# Patient Record
Sex: FEMALE | Race: WHITE | HISPANIC OR LATINO | Employment: OTHER | URBAN - METROPOLITAN AREA
[De-identification: names, ages, dates, MRNs, and addresses within clinical notes are randomized per-mention and may not be internally consistent; named-entity substitution may affect disease eponyms.]

---

## 2019-06-20 ENCOUNTER — HOSPITAL ENCOUNTER (OUTPATIENT)
Dept: RADIOLOGY | Facility: HOSPITAL | Age: 84
Discharge: HOME | End: 2019-06-20
Attending: INTERNAL MEDICINE
Payer: MEDICARE

## 2019-06-20 DIAGNOSIS — M25.562 LEFT KNEE PAIN: ICD-10-CM

## 2019-06-20 DIAGNOSIS — M25.561 RIGHT KNEE PAIN: Primary | ICD-10-CM

## 2019-06-20 DIAGNOSIS — M25.561 RIGHT KNEE PAIN: ICD-10-CM

## 2019-06-20 DIAGNOSIS — M25.562 LEFT KNEE PAIN: Primary | ICD-10-CM

## 2019-06-20 PROCEDURE — 73560 X-RAY EXAM OF KNEE 1 OR 2: CPT | Mod: 50,TC,FY

## 2019-06-20 PROCEDURE — 73560 X-RAY EXAM OF KNEE 1 OR 2: CPT | Mod: 26,50,, | Performed by: RADIOLOGY

## 2019-06-20 PROCEDURE — 73560 XR KNEE 1 OR 2 VIEW BILATERAL: ICD-10-PCS | Mod: 26,50,, | Performed by: RADIOLOGY

## 2020-08-17 PROBLEM — M17.0 PRIMARY OSTEOARTHRITIS OF BOTH KNEES: Status: ACTIVE | Noted: 2019-08-15

## 2020-08-17 PROBLEM — G47.00 INSOMNIA: Status: ACTIVE | Noted: 2018-07-26

## 2020-08-17 PROBLEM — J44.9 CHRONIC OBSTRUCTIVE PULMONARY DISEASE, UNSPECIFIED: Status: ACTIVE | Noted: 2019-08-15

## 2020-08-17 PROBLEM — M46.96 INFLAMMATORY SPONDYLOPATHY OF LUMBAR REGION: Status: ACTIVE | Noted: 2017-07-26

## 2021-02-25 ENCOUNTER — HOSPITAL ENCOUNTER (OUTPATIENT)
Dept: RADIOLOGY | Facility: HOSPITAL | Age: 86
Discharge: HOME | End: 2021-02-25
Attending: INTERNAL MEDICINE
Payer: MEDICARE

## 2021-02-25 ENCOUNTER — CLINICAL SUPPORT (OUTPATIENT)
Dept: LAB | Facility: HOSPITAL | Age: 86
End: 2021-02-25
Attending: INTERNAL MEDICINE
Payer: MEDICARE

## 2021-02-25 DIAGNOSIS — I87.2 VENOUS (PERIPHERAL) INSUFFICIENCY: ICD-10-CM

## 2021-02-25 DIAGNOSIS — E11.59 TYPE 2 DIABETES MELLITUS WITH OTHER CIRCULATORY COMPLICATIONS: ICD-10-CM

## 2021-02-25 DIAGNOSIS — Z01.818 PREOP EXAMINATION: ICD-10-CM

## 2021-02-25 DIAGNOSIS — J44.9 CHRONIC OBSTRUCTIVE PULMONARY DISEASE, UNSPECIFIED COPD TYPE: ICD-10-CM

## 2021-02-25 PROBLEM — M17.9 OSTEOARTHRITIS OF KNEE: Status: ACTIVE | Noted: 2021-02-25

## 2021-02-25 PROBLEM — E11.42 POLYNEUROPATHY DUE TO TYPE 2 DIABETES MELLITUS: Status: ACTIVE | Noted: 2021-02-25

## 2021-02-25 PROBLEM — E11.51 PERIPHERAL VASCULAR DISORDER DUE TO DIABETES MELLITUS: Status: ACTIVE | Noted: 2021-02-25

## 2021-02-25 PROCEDURE — 71046 X-RAY EXAM CHEST 2 VIEWS: CPT | Mod: TC,FY

## 2021-02-25 PROCEDURE — 93005 ELECTROCARDIOGRAM TRACING: CPT

## 2021-02-25 PROCEDURE — 93010 EKG 12-LEAD: ICD-10-PCS | Mod: ,,, | Performed by: INTERNAL MEDICINE

## 2021-02-25 PROCEDURE — 71046 XR CHEST PA AND LATERAL: ICD-10-PCS | Mod: 26,,, | Performed by: RADIOLOGY

## 2021-02-25 PROCEDURE — 93010 ELECTROCARDIOGRAM REPORT: CPT | Mod: ,,, | Performed by: INTERNAL MEDICINE

## 2021-02-25 PROCEDURE — 71046 X-RAY EXAM CHEST 2 VIEWS: CPT | Mod: 26,,, | Performed by: RADIOLOGY

## 2022-09-02 ENCOUNTER — LAB VISIT (OUTPATIENT)
Dept: LAB | Facility: HOSPITAL | Age: 87
End: 2022-09-02
Attending: INTERNAL MEDICINE
Payer: MEDICARE

## 2022-09-02 DIAGNOSIS — N18.32 TYPE 2 DIABETES MELLITUS WITH STAGE 3B CHRONIC KIDNEY DISEASE, WITHOUT LONG-TERM CURRENT USE OF INSULIN: ICD-10-CM

## 2022-09-02 DIAGNOSIS — E11.22 TYPE 2 DIABETES MELLITUS WITH STAGE 3B CHRONIC KIDNEY DISEASE, WITHOUT LONG-TERM CURRENT USE OF INSULIN: ICD-10-CM

## 2022-09-02 LAB
ALBUMIN SERPL BCP-MCNC: 3.6 G/DL (ref 3.5–5.2)
ALP SERPL-CCNC: 39 U/L (ref 55–135)
ALT SERPL W/O P-5'-P-CCNC: <5 U/L (ref 10–44)
ANION GAP SERPL CALC-SCNC: 8 MMOL/L (ref 8–16)
AST SERPL-CCNC: 13 U/L (ref 10–40)
BASOPHILS # BLD AUTO: 0.03 K/UL (ref 0–0.2)
BASOPHILS NFR BLD: 0.5 % (ref 0–1.9)
BILIRUB SERPL-MCNC: 0.3 MG/DL (ref 0.1–1)
BUN SERPL-MCNC: 38 MG/DL (ref 8–23)
CALCIUM SERPL-MCNC: 9.1 MG/DL (ref 8.7–10.5)
CHLORIDE SERPL-SCNC: 108 MMOL/L (ref 95–110)
CHOLEST SERPL-MCNC: 112 MG/DL (ref 120–199)
CHOLEST/HDLC SERPL: 3.4 {RATIO} (ref 2–5)
CO2 SERPL-SCNC: 22 MMOL/L (ref 23–29)
CREAT SERPL-MCNC: 1.4 MG/DL (ref 0.5–1.4)
DIFFERENTIAL METHOD: ABNORMAL
EOSINOPHIL # BLD AUTO: 0.4 K/UL (ref 0–0.5)
EOSINOPHIL NFR BLD: 6.7 % (ref 0–8)
ERYTHROCYTE [DISTWIDTH] IN BLOOD BY AUTOMATED COUNT: 14.4 % (ref 11.5–14.5)
EST. GFR  (NO RACE VARIABLE): 37 ML/MIN/1.73 M^2
ESTIMATED AVG GLUCOSE: 120 MG/DL (ref 68–131)
GLUCOSE SERPL-MCNC: 94 MG/DL (ref 70–110)
HBA1C MFR BLD: 5.8 % (ref 4–5.6)
HCT VFR BLD AUTO: 30 % (ref 37–48.5)
HDLC SERPL-MCNC: 33 MG/DL (ref 40–75)
HDLC SERPL: 29.5 % (ref 20–50)
HGB BLD-MCNC: 9.7 G/DL (ref 12–16)
IMM GRANULOCYTES # BLD AUTO: 0.03 K/UL (ref 0–0.04)
IMM GRANULOCYTES NFR BLD AUTO: 0.5 % (ref 0–0.5)
LDLC SERPL CALC-MCNC: 56 MG/DL (ref 63–159)
LYMPHOCYTES # BLD AUTO: 1.3 K/UL (ref 1–4.8)
LYMPHOCYTES NFR BLD: 20.5 % (ref 18–48)
MCH RBC QN AUTO: 34.4 PG (ref 27–31)
MCHC RBC AUTO-ENTMCNC: 32.3 G/DL (ref 32–36)
MCV RBC AUTO: 106 FL (ref 82–98)
MONOCYTES # BLD AUTO: 0.4 K/UL (ref 0.3–1)
MONOCYTES NFR BLD: 6.1 % (ref 4–15)
NEUTROPHILS # BLD AUTO: 4.2 K/UL (ref 1.8–7.7)
NEUTROPHILS NFR BLD: 65.7 % (ref 38–73)
NONHDLC SERPL-MCNC: 79 MG/DL
NRBC BLD-RTO: 0 /100 WBC
PLATELET # BLD AUTO: 151 K/UL (ref 150–450)
PMV BLD AUTO: 12.7 FL (ref 9.2–12.9)
POTASSIUM SERPL-SCNC: 4.8 MMOL/L (ref 3.5–5.1)
PROT SERPL-MCNC: 6.9 G/DL (ref 6–8.4)
RBC # BLD AUTO: 2.82 M/UL (ref 4–5.4)
SODIUM SERPL-SCNC: 138 MMOL/L (ref 136–145)
TRIGL SERPL-MCNC: 115 MG/DL (ref 30–150)
WBC # BLD AUTO: 6.44 K/UL (ref 3.9–12.7)

## 2022-09-02 PROCEDURE — 80053 COMPREHEN METABOLIC PANEL: CPT | Performed by: INTERNAL MEDICINE

## 2022-09-02 PROCEDURE — 85025 COMPLETE CBC W/AUTO DIFF WBC: CPT | Performed by: INTERNAL MEDICINE

## 2022-09-02 PROCEDURE — 83036 HEMOGLOBIN GLYCOSYLATED A1C: CPT | Performed by: INTERNAL MEDICINE

## 2022-09-02 PROCEDURE — 80061 LIPID PANEL: CPT | Performed by: INTERNAL MEDICINE

## 2022-09-02 PROCEDURE — 36415 COLL VENOUS BLD VENIPUNCTURE: CPT | Performed by: INTERNAL MEDICINE

## 2022-12-08 ENCOUNTER — HOSPITAL ENCOUNTER (EMERGENCY)
Facility: HOSPITAL | Age: 87
Discharge: HOME | End: 2022-12-08
Attending: EMERGENCY MEDICINE
Payer: MEDICARE

## 2022-12-08 VITALS
OXYGEN SATURATION: 100 % | RESPIRATION RATE: 18 BRPM | HEART RATE: 68 BPM | SYSTOLIC BLOOD PRESSURE: 178 MMHG | TEMPERATURE: 98 F | DIASTOLIC BLOOD PRESSURE: 81 MMHG

## 2022-12-08 DIAGNOSIS — K59.00 CONSTIPATION, UNSPECIFIED CONSTIPATION TYPE: Primary | ICD-10-CM

## 2022-12-08 DIAGNOSIS — K59.00 CONSTIPATION: ICD-10-CM

## 2022-12-08 PROCEDURE — 99283 EMERGENCY DEPT VISIT LOW MDM: CPT

## 2022-12-08 RX ORDER — POLYETHYLENE GLYCOL 3350, SODIUM SULFATE ANHYDROUS, SODIUM BICARBONATE, SODIUM CHLORIDE, POTASSIUM CHLORIDE 236; 22.74; 6.74; 5.86; 2.97 G/4L; G/4L; G/4L; G/4L; G/4L
4 POWDER, FOR SOLUTION ORAL ONCE
Qty: 4000 ML | Refills: 0 | Status: SHIPPED | OUTPATIENT
Start: 2022-12-08 | End: 2022-12-08

## 2022-12-08 RX ORDER — SYRING-NEEDL,DISP,INSUL,0.3 ML 29 G X1/2"
296 SYRINGE, EMPTY DISPOSABLE MISCELLANEOUS ONCE
Qty: 296 ML | Refills: 0 | Status: SHIPPED | OUTPATIENT
Start: 2022-12-08 | End: 2022-12-08

## 2022-12-08 NOTE — DISCHARGE INSTRUCTIONS
Plenty of fluids.  Take half a bottle of magnesium citrate when you go home, if no bowel movement in 2-3 hours, take the other half.

## 2022-12-08 NOTE — ED PROVIDER NOTES
Encounter Date: 12/8/2022    SCRIBE #1 NOTE: I, Joelle Sabillon, am scribing for, and in the presence of,  Eduardo Celeste MD. I have scribed the following portions of the note - Other sections scribed: BRIANNA CAI.     History     Chief Complaint   Patient presents with    Constipation     Unable to have BM for 2 days.  Has used OTC medications that are not working.     Dahlia Spence is a 87 y.o. female who presents to the ED with constipation with onset 2 days ago. Her associated symptoms include abdominal cramping. She denies nausea. She tried taking Miralax and Milk of Magnesia without relief. She has not had similar symptoms in the past. She states her last bowel movement was 2 days ago.       The history is provided by the patient. No  was used.   Review of patient's allergies indicates:   Allergen Reactions    Gabapentin Hallucinations     Past Medical History:   Diagnosis Date    Arthritis     Gout     High cholesterol     Hypertension      Past Surgical History:   Procedure Laterality Date    ANKLE SURGERY      CHOLECYSTECTOMY      HYSTERECTOMY       No family history on file.  Social History     Tobacco Use    Smoking status: Never    Smokeless tobacco: Never   Substance Use Topics    Alcohol use: No    Drug use: No     Review of Systems   Constitutional: Negative.    HENT: Negative.     Eyes: Negative.    Respiratory: Negative.     Cardiovascular: Negative.    Gastrointestinal:  Positive for abdominal pain (abdominal cramping) and constipation. Negative for nausea.   Genitourinary: Negative.    Musculoskeletal: Negative.    Skin: Negative.    Neurological: Negative.      Physical Exam     Initial Vitals   BP Pulse Resp Temp SpO2   12/08/22 1351 12/08/22 1351 12/08/22 1351 12/08/22 1515 12/08/22 1351   (!) 178/81 68 18 98.4 °F (36.9 °C) 100 %      MAP       --                Physical Exam    Nursing note and vitals reviewed.  Constitutional: She appears well-developed and  well-nourished. She is not diaphoretic. No distress.   HENT:   Head: Normocephalic and atraumatic.   Nose: Nose normal.   Eyes: EOM are normal. Pupils are equal, round, and reactive to light.   Neck: Neck supple. No JVD present.   Normal range of motion.  Cardiovascular:  Normal rate, regular rhythm, normal heart sounds and intact distal pulses.           Pulmonary/Chest: Breath sounds normal. No stridor. No respiratory distress. She has no wheezes. She has no rales.   Abdominal: Abdomen is soft. Bowel sounds are normal. She exhibits no distension. There is no abdominal tenderness.   Musculoskeletal:         General: No tenderness or edema. Normal range of motion.      Cervical back: Normal range of motion and neck supple.     Neurological: She is alert and oriented to person, place, and time. She has normal strength.   Skin: Skin is warm and dry. Capillary refill takes less than 2 seconds. No rash noted. No erythema.       ED Course   Procedures  Labs Reviewed - No data to display       Imaging Results              X-Ray Abdomen AP 1 View (KUB) (Final result)  Result time 12/08/22 15:37:35      Final result by Gilles Pavon MD (12/08/22 15:37:35)                   Impression:      1. Findings suggest constipation, correlation advised.      Electronically signed by: Gilles Pavon MD  Date:    12/08/2022  Time:    15:37               Narrative:    EXAMINATION:  XR ABDOMEN AP 1 VIEW    CLINICAL HISTORY:  Constipation, unspecified    TECHNIQUE:  AP View(s) of the abdomen was performed.    COMPARISON:  None    FINDINGS:  Three views abdomen supine.    Air and stool is seen within the large bowel and projected over the rectum.  There is a large amount of stool throughout the colon.  There is slow flow through a few scattered small bowel loops.  There are no calcifications to convincingly suggest nephrolithiasis or cholelithiasis.  The lower lung zones are grossly clear.  There are degenerative changes of the  osseous structures noting disc spacing material at L4-L5.  No findings to suggest pneumatosis.                                       Medications - No data to display       MDM:    87-year-old female with past medical history as noted above presenting with concern for constipation.  Physical exam as noted above, ED workup notable for KUB with evidence of constipation.  Patient presentation consistent with constipation with history, exam in additional findings consistent with this.  Advised patient on further symptomatic care at home, physical exam today's completely unremarkable otherwise with stable vitals.  Discussed with family member bedside regarding plan of care, additional symptomatic care and strict ED return precautions.  At this point time based on physical exam evaluation not suspect acute small-bowel obstruction, large-bowel obstruction, volvulus, intussusception, appendicitis, pancreatitis, cholecystitis, gastric ulcer, or any further surgical or medical emergency. Discussed diagnosis and further treatment with patient, including f/u.  Return precautions given and all questions answered.  Patient in understanding of plan.  Pt discharged to home improved and stable.          Scribe Attestation:   Scribe #1: I performed the above scribed service and the documentation accurately describes the services I performed. I attest to the accuracy of the note.                   Clinical Impression:   Final diagnoses:  [K59.00] Constipation  [K59.00] Constipation, unspecified constipation type (Primary)        ED Disposition Condition    Discharge Stable          ED Prescriptions       Medication Sig Dispense Start Date End Date Auth. Provider    magnesium citrate solution () Take 296 mLs by mouth once. for 1 dose 296 mL 2022 Eduardo Celeste MD    polyethylene glycol (GOLYTELY) 236-22.74-6.74 -5.86 gram suspension () Take 4,000 mLs (4 L total) by mouth once. for 1 dose 4,000 mL  12/8/2022 12/8/2022 Eduardo Celeste MD          Follow-up Information       Follow up With Specialties Details Why Contact Info    [facility] - Emergency Dept Emergency Medicine Go to  If symptoms worsen [address]  [facility] [id number] [phone]    Frandy Roe MD Internal Medicine Go in 1 week As needed [address]  [address]  [facility] [id number] [phone]            Physician Attestation for Scribe: I, Eduardo Celeste M.D., reviewed documentation as scribed in my presence, which is both accurate and complete.      Eduardo Celeste MD  12/09/22 0129

## 2022-12-08 NOTE — FIRST PROVIDER EVALUATION
Emergency Department TeleTriage Encounter Note      CHIEF COMPLAINT    Chief Complaint   Patient presents with    Constipation     Unable to have BM for 2 days.  Has used OTC medications that are not working.       VITAL SIGNS   Initial Vitals   BP Pulse Resp Temp SpO2   -- -- -- -- --      MAP       --            ALLERGIES    Review of patient's allergies indicates:   Allergen Reactions    Gabapentin Hallucinations       PROVIDER TRIAGE NOTE  TeleTriage Note: Dahlia Spence, a nontoxic/well appearing, 87 y.o. female, presented to the ED with c/o constipation for the past 2 days. She has taken miralax and a laxative without relief. Denies N/V.    All ED beds are full at present; patient notified of this status.  Patient seen and medically screened by Nurse Practitioner via teletriage. Orders initiated at triage to expedite care.  Patient is stable to return to the waiting room and will be placed in an ED bed when available.  Care will be transferred to an alternate provider when patient has been placed in an Exam Room from the [facility] for physical exam, additional orders, and disposition.  12:50 PM Betsy Anne DNP, FNP-C        ORDERS  Labs Reviewed - No data to display    ED Orders (720h ago, onward)      None              Virtual Visit Note: The provider triage portion of this emergency department evaluation and documentation was performed via [employer], a HIPAA-compliant telemedicine application, in concert with a tele-presenter in the room. A face to face patient evaluation with one of my colleagues will occur once the patient is placed in an emergency department room.      DISCLAIMER: This note was prepared with [employer] voice recognition transcription software. Garbled syntax, mangled pronouns, and other bizarre constructions may be attributed to that software system.

## 2023-03-07 ENCOUNTER — LAB VISIT (OUTPATIENT)
Dept: LAB | Facility: HOSPITAL | Age: 88
End: 2023-03-07
Attending: INTERNAL MEDICINE
Payer: MEDICARE

## 2023-03-07 DIAGNOSIS — N18.32 TYPE 2 DIABETES MELLITUS WITH STAGE 3B CHRONIC KIDNEY DISEASE, WITHOUT LONG-TERM CURRENT USE OF INSULIN: ICD-10-CM

## 2023-03-07 DIAGNOSIS — D53.9 ANEMIA, MACROCYTIC: ICD-10-CM

## 2023-03-07 DIAGNOSIS — L30.8 PRURITIC DERMATITIS: ICD-10-CM

## 2023-03-07 DIAGNOSIS — E11.22 TYPE 2 DIABETES MELLITUS WITH STAGE 3B CHRONIC KIDNEY DISEASE, WITHOUT LONG-TERM CURRENT USE OF INSULIN: ICD-10-CM

## 2023-03-07 LAB
ALBUMIN SERPL BCP-MCNC: 3.8 G/DL (ref 3.5–5.2)
ALP SERPL-CCNC: 36 U/L (ref 55–135)
ALT SERPL W/O P-5'-P-CCNC: 5 U/L (ref 10–44)
ANION GAP SERPL CALC-SCNC: 7 MMOL/L (ref 8–16)
AST SERPL-CCNC: 18 U/L (ref 10–40)
BASOPHILS # BLD AUTO: 0.04 K/UL (ref 0–0.2)
BASOPHILS NFR BLD: 0.7 % (ref 0–1.9)
BILIRUB SERPL-MCNC: 0.2 MG/DL (ref 0.1–1)
BUN SERPL-MCNC: 39 MG/DL (ref 8–23)
CALCIUM SERPL-MCNC: 9.2 MG/DL (ref 8.7–10.5)
CHLORIDE SERPL-SCNC: 107 MMOL/L (ref 95–110)
CO2 SERPL-SCNC: 22 MMOL/L (ref 23–29)
CREAT SERPL-MCNC: 1.5 MG/DL (ref 0.5–1.4)
CRP SERPL-MCNC: 0.4 MG/L (ref 0–8.2)
DIFFERENTIAL METHOD: ABNORMAL
EOSINOPHIL # BLD AUTO: 0.6 K/UL (ref 0–0.5)
EOSINOPHIL NFR BLD: 10.9 % (ref 0–8)
ERYTHROCYTE [DISTWIDTH] IN BLOOD BY AUTOMATED COUNT: 14.7 % (ref 11.5–14.5)
ERYTHROCYTE [SEDIMENTATION RATE] IN BLOOD BY PHOTOMETRIC METHOD: 37 MM/HR (ref 0–36)
EST. GFR  (NO RACE VARIABLE): 34 ML/MIN/1.73 M^2
ESTIMATED AVG GLUCOSE: 111 MG/DL (ref 68–131)
GLUCOSE SERPL-MCNC: 90 MG/DL (ref 70–110)
HBA1C MFR BLD: 5.5 % (ref 4–5.6)
HCT VFR BLD AUTO: 29.5 % (ref 37–48.5)
HGB BLD-MCNC: 9.7 G/DL (ref 12–16)
IMM GRANULOCYTES # BLD AUTO: 0.02 K/UL (ref 0–0.04)
IMM GRANULOCYTES NFR BLD AUTO: 0.4 % (ref 0–0.5)
LYMPHOCYTES # BLD AUTO: 1.3 K/UL (ref 1–4.8)
LYMPHOCYTES NFR BLD: 24.2 % (ref 18–48)
MCH RBC QN AUTO: 34.6 PG (ref 27–31)
MCHC RBC AUTO-ENTMCNC: 32.9 G/DL (ref 32–36)
MCV RBC AUTO: 105 FL (ref 82–98)
MONOCYTES # BLD AUTO: 0.4 K/UL (ref 0.3–1)
MONOCYTES NFR BLD: 7.3 % (ref 4–15)
NEUTROPHILS # BLD AUTO: 3.1 K/UL (ref 1.8–7.7)
NEUTROPHILS NFR BLD: 56.5 % (ref 38–73)
NRBC BLD-RTO: 0 /100 WBC
PLATELET # BLD AUTO: 150 K/UL (ref 150–450)
PMV BLD AUTO: 12 FL (ref 9.2–12.9)
POTASSIUM SERPL-SCNC: 4.7 MMOL/L (ref 3.5–5.1)
PROT SERPL-MCNC: 7.1 G/DL (ref 6–8.4)
RBC # BLD AUTO: 2.8 M/UL (ref 4–5.4)
SODIUM SERPL-SCNC: 136 MMOL/L (ref 136–145)
WBC # BLD AUTO: 5.49 K/UL (ref 3.9–12.7)

## 2023-03-07 PROCEDURE — 36415 COLL VENOUS BLD VENIPUNCTURE: CPT | Performed by: INTERNAL MEDICINE

## 2023-03-07 PROCEDURE — 83036 HEMOGLOBIN GLYCOSYLATED A1C: CPT | Performed by: INTERNAL MEDICINE

## 2023-03-07 PROCEDURE — 82607 VITAMIN B-12: CPT | Performed by: INTERNAL MEDICINE

## 2023-03-07 PROCEDURE — 82746 ASSAY OF FOLIC ACID SERUM: CPT | Performed by: INTERNAL MEDICINE

## 2023-03-07 PROCEDURE — 86140 C-REACTIVE PROTEIN: CPT | Performed by: INTERNAL MEDICINE

## 2023-03-07 PROCEDURE — 85652 RBC SED RATE AUTOMATED: CPT | Performed by: INTERNAL MEDICINE

## 2023-03-07 PROCEDURE — 80053 COMPREHEN METABOLIC PANEL: CPT | Performed by: INTERNAL MEDICINE

## 2023-03-07 PROCEDURE — 85025 COMPLETE CBC W/AUTO DIFF WBC: CPT | Performed by: INTERNAL MEDICINE

## 2023-03-08 LAB
FOLATE SERPL-MCNC: 6.8 NG/ML (ref 4–24)
VIT B12 SERPL-MCNC: 232 PG/ML (ref 210–950)

## 2024-03-25 ENCOUNTER — HOSPITAL ENCOUNTER (OUTPATIENT)
Dept: RADIOLOGY | Facility: HOSPITAL | Age: 89
Discharge: HOME | End: 2024-03-25
Attending: INTERNAL MEDICINE
Payer: MEDICAID

## 2024-03-25 ENCOUNTER — CLINICAL SUPPORT (OUTPATIENT)
Dept: LAB | Facility: HOSPITAL | Age: 89
End: 2024-03-25
Attending: INTERNAL MEDICINE
Payer: MEDICARE

## 2024-03-25 DIAGNOSIS — J44.9 CHRONIC OBSTRUCTIVE PULMONARY DISEASE, UNSPECIFIED COPD TYPE: ICD-10-CM

## 2024-03-25 DIAGNOSIS — I10 BENIGN ESSENTIAL HYPERTENSION: ICD-10-CM

## 2024-03-25 PROBLEM — L84 CORN OF FOOT: Status: ACTIVE | Noted: 2024-03-25

## 2024-03-25 PROBLEM — L30.8 PRURITIC DERMATITIS: Status: ACTIVE | Noted: 2024-03-25

## 2024-03-25 PROCEDURE — 71046 X-RAY EXAM CHEST 2 VIEWS: CPT | Mod: FY,,, | Performed by: RADIOLOGY

## 2024-03-25 PROCEDURE — 93010 ELECTROCARDIOGRAM REPORT: CPT | Mod: ,,, | Performed by: INTERNAL MEDICINE

## 2024-03-25 PROCEDURE — 71046 X-RAY EXAM CHEST 2 VIEWS: CPT | Mod: TC,FY

## 2024-03-25 PROCEDURE — 93005 ELECTROCARDIOGRAM TRACING: CPT

## 2024-03-26 LAB
OHS QRS DURATION: 120 MS
OHS QTC CALCULATION: 434 MS

## 2024-03-28 ENCOUNTER — TELEPHONE (OUTPATIENT)
Dept: PODIATRY | Facility: CLINIC | Age: 89
End: 2024-03-28
Payer: MEDICARE

## 2024-03-28 NOTE — TELEPHONE ENCOUNTER
Called the number provided in the message back, is a Nursing home, no answer, left message to let them know if they need an appointment, they can call our appointment line [phone] , and they can help scheduling an appointment to see Dr. Chi or Dr. Hurd

## 2024-03-28 NOTE — TELEPHONE ENCOUNTER
----- Message from Ana Olsen sent at 3/28/2024  2:17 PM CDT -----  Regarding: Call back  Contact: [phone]  Type:  Patient Returning Call    Who Called: PT   Who Left Message for Patient: Nurse   Does the patient know what this is regarding?: Yes   Would the patient rather a call back or a response via [facility]? Call back   Best Call Back Number: [phone]   Additional Information:

## 2024-04-01 ENCOUNTER — TELEPHONE (OUTPATIENT)
Dept: PODIATRY | Facility: CLINIC | Age: 89
End: 2024-04-01
Payer: MEDICARE

## 2024-06-21 ENCOUNTER — HOSPITAL ENCOUNTER (INPATIENT)
Facility: HOSPITAL | Age: 89
LOS: 11 days | Discharge: NURSING, REHAB OR LONG-TERM CARE | DRG: 814 | End: 2024-07-03
Attending: EMERGENCY MEDICINE | Admitting: INTERNAL MEDICINE
Payer: MEDICARE

## 2024-06-21 DIAGNOSIS — I10 BENIGN ESSENTIAL HYPERTENSION: ICD-10-CM

## 2024-06-21 DIAGNOSIS — R00.1 BRADYCARDIA: ICD-10-CM

## 2024-06-21 DIAGNOSIS — L03.115 CELLULITIS OF KNEE, RIGHT: Primary | ICD-10-CM

## 2024-06-21 DIAGNOSIS — T84.50XA PROSTHETIC JOINT INFECTION: ICD-10-CM

## 2024-06-21 DIAGNOSIS — L50.9 HIVES: ICD-10-CM

## 2024-06-21 DIAGNOSIS — T78.40XA ALLERGIC REACTION: ICD-10-CM

## 2024-06-21 DIAGNOSIS — R21 RASH: ICD-10-CM

## 2024-06-21 DIAGNOSIS — T78.40XA ALLERGIC REACTION TO DRUG, INITIAL ENCOUNTER: ICD-10-CM

## 2024-06-21 DIAGNOSIS — R60.9 EDEMA: ICD-10-CM

## 2024-06-21 LAB
ALBUMIN SERPL BCP-MCNC: 1.8 G/DL (ref 3.5–5.2)
ALLENS TEST: ABNORMAL
ALP SERPL-CCNC: 81 U/L (ref 55–135)
ALT SERPL W/O P-5'-P-CCNC: 5 U/L (ref 10–44)
ANION GAP SERPL CALC-SCNC: 21 MMOL/L (ref 8–16)
AST SERPL-CCNC: 14 U/L (ref 10–40)
BASOPHILS # BLD AUTO: 0.13 K/UL (ref 0–0.2)
BASOPHILS NFR BLD: 0.6 % (ref 0–1.9)
BILIRUB SERPL-MCNC: 0.2 MG/DL (ref 0.1–1)
BUN SERPL-MCNC: 87 MG/DL (ref 8–23)
BUN SERPL-MCNC: 96 MG/DL (ref 6–30)
CALCIUM SERPL-MCNC: 8 MG/DL (ref 8.7–10.5)
CHLORIDE SERPL-SCNC: 103 MMOL/L (ref 95–110)
CHLORIDE SERPL-SCNC: 106 MMOL/L (ref 95–110)
CO2 SERPL-SCNC: 12 MMOL/L (ref 23–29)
CREAT SERPL-MCNC: 5 MG/DL (ref 0.5–1.4)
CREAT SERPL-MCNC: 5.6 MG/DL (ref 0.5–1.4)
CRP SERPL-MCNC: 207.7 MG/L (ref 0–8.2)
DIFFERENTIAL METHOD BLD: ABNORMAL
EOSINOPHIL # BLD AUTO: 1.4 K/UL (ref 0–0.5)
EOSINOPHIL NFR BLD: 6.8 % (ref 0–8)
ERYTHROCYTE [DISTWIDTH] IN BLOOD BY AUTOMATED COUNT: 22.3 % (ref 11.5–14.5)
ERYTHROCYTE [SEDIMENTATION RATE] IN BLOOD BY PHOTOMETRIC METHOD: 61 MM/HR (ref 0–36)
EST. GFR  (NO RACE VARIABLE): 7.9 ML/MIN/1.73 M^2
GLUCOSE SERPL-MCNC: 115 MG/DL (ref 70–110)
GLUCOSE SERPL-MCNC: 119 MG/DL (ref 70–110)
HCT VFR BLD AUTO: 37.4 % (ref 37–48.5)
HCT VFR BLD CALC: 40 %PCV (ref 36–54)
HCV AB SERPL QL IA: NORMAL
HGB BLD-MCNC: 12.6 G/DL (ref 12–16)
HIV 1+2 AB+HIV1 P24 AG SERPL QL IA: NORMAL
IMM GRANULOCYTES # BLD AUTO: 0.95 K/UL (ref 0–0.04)
IMM GRANULOCYTES NFR BLD AUTO: 4.6 % (ref 0–0.5)
LACTATE SERPL-SCNC: 1.3 MMOL/L (ref 0.5–2.2)
LDH SERPL L TO P-CCNC: 2.77 MMOL/L (ref 0.5–2.2)
LYMPHOCYTES # BLD AUTO: 3.1 K/UL (ref 1–4.8)
LYMPHOCYTES NFR BLD: 14.8 % (ref 18–48)
MAGNESIUM SERPL-MCNC: 1.9 MG/DL (ref 1.6–2.6)
MCH RBC QN AUTO: 32 PG (ref 27–31)
MCHC RBC AUTO-ENTMCNC: 33.7 G/DL (ref 32–36)
MCV RBC AUTO: 95 FL (ref 82–98)
MONOCYTES # BLD AUTO: 0.4 K/UL (ref 0.3–1)
MONOCYTES NFR BLD: 1.7 % (ref 4–15)
NEUTROPHILS # BLD AUTO: 14.7 K/UL (ref 1.8–7.7)
NEUTROPHILS NFR BLD: 71.5 % (ref 38–73)
NRBC BLD-RTO: 1 /100 WBC
OHS QRS DURATION: 128 MS
OHS QTC CALCULATION: 511 MS
PLATELET # BLD AUTO: 225 K/UL (ref 150–450)
PMV BLD AUTO: 10.9 FL (ref 9.2–12.9)
POC IONIZED CALCIUM: 1.03 MMOL/L (ref 1.06–1.42)
POC TCO2 (MEASURED): 13 MMOL/L (ref 23–29)
POCT GLUCOSE: 172 MG/DL (ref 70–110)
POTASSIUM BLD-SCNC: 2.7 MMOL/L (ref 3.5–5.1)
POTASSIUM SERPL-SCNC: 3 MMOL/L (ref 3.5–5.1)
PROT SERPL-MCNC: 6.3 G/DL (ref 6–8.4)
RBC # BLD AUTO: 3.94 M/UL (ref 4–5.4)
SAMPLE: ABNORMAL
SAMPLE: ABNORMAL
SITE: ABNORMAL
SODIUM BLD-SCNC: 137 MMOL/L (ref 136–145)
SODIUM SERPL-SCNC: 136 MMOL/L (ref 136–145)
WBC # BLD AUTO: 20.56 K/UL (ref 3.9–12.7)

## 2024-06-21 PROCEDURE — 84300 ASSAY OF URINE SODIUM: CPT

## 2024-06-21 PROCEDURE — 85025 COMPLETE CBC W/AUTO DIFF WBC: CPT | Performed by: EMERGENCY MEDICINE

## 2024-06-21 PROCEDURE — 86140 C-REACTIVE PROTEIN: CPT | Performed by: EMERGENCY MEDICINE

## 2024-06-21 PROCEDURE — 96372 THER/PROPH/DIAG INJ SC/IM: CPT

## 2024-06-21 PROCEDURE — 99285 EMERGENCY DEPT VISIT HI MDM: CPT | Mod: 25

## 2024-06-21 PROCEDURE — G0378 HOSPITAL OBSERVATION PER HR: HCPCS

## 2024-06-21 PROCEDURE — 85652 RBC SED RATE AUTOMATED: CPT | Performed by: EMERGENCY MEDICINE

## 2024-06-21 PROCEDURE — 25000003 PHARM REV CODE 250: Performed by: EMERGENCY MEDICINE

## 2024-06-21 PROCEDURE — 25000003 PHARM REV CODE 250

## 2024-06-21 PROCEDURE — 96361 HYDRATE IV INFUSION ADD-ON: CPT

## 2024-06-21 PROCEDURE — 63600175 PHARM REV CODE 636 W HCPCS

## 2024-06-21 PROCEDURE — 83605 ASSAY OF LACTIC ACID: CPT

## 2024-06-21 PROCEDURE — 99900035 HC TECH TIME PER 15 MIN (STAT)

## 2024-06-21 PROCEDURE — 87040 BLOOD CULTURE FOR BACTERIA: CPT | Mod: 59 | Performed by: EMERGENCY MEDICINE

## 2024-06-21 PROCEDURE — 96365 THER/PROPH/DIAG IV INF INIT: CPT

## 2024-06-21 PROCEDURE — 63600175 PHARM REV CODE 636 W HCPCS: Performed by: EMERGENCY MEDICINE

## 2024-06-21 PROCEDURE — 36415 COLL VENOUS BLD VENIPUNCTURE: CPT

## 2024-06-21 PROCEDURE — 83735 ASSAY OF MAGNESIUM: CPT | Performed by: EMERGENCY MEDICINE

## 2024-06-21 PROCEDURE — 86803 HEPATITIS C AB TEST: CPT | Performed by: PHYSICIAN ASSISTANT

## 2024-06-21 PROCEDURE — 87389 HIV-1 AG W/HIV-1&-2 AB AG IA: CPT | Performed by: PHYSICIAN ASSISTANT

## 2024-06-21 PROCEDURE — 80053 COMPREHEN METABOLIC PANEL: CPT | Performed by: EMERGENCY MEDICINE

## 2024-06-21 PROCEDURE — 82570 ASSAY OF URINE CREATININE: CPT

## 2024-06-21 PROCEDURE — 96375 TX/PRO/DX INJ NEW DRUG ADDON: CPT

## 2024-06-21 RX ORDER — ASPIRIN 81 MG/1
81 TABLET ORAL DAILY
COMMUNITY

## 2024-06-21 RX ORDER — QUETIAPINE FUMARATE 25 MG/1
25 TABLET, FILM COATED ORAL DAILY
Status: DISCONTINUED | OUTPATIENT
Start: 2024-06-22 | End: 2024-06-21

## 2024-06-21 RX ORDER — HYDROXYZINE HYDROCHLORIDE 25 MG/1
25 TABLET, FILM COATED ORAL 3 TIMES DAILY PRN
Status: DISCONTINUED | OUTPATIENT
Start: 2024-06-21 | End: 2024-07-04 | Stop reason: HOSPADM

## 2024-06-21 RX ORDER — GLUCAGON 1 MG
1 KIT INJECTION
Status: DISCONTINUED | OUTPATIENT
Start: 2024-06-21 | End: 2024-07-04 | Stop reason: HOSPADM

## 2024-06-21 RX ORDER — SODIUM CHLORIDE 0.9 % (FLUSH) 0.9 %
10 SYRINGE (ML) INJECTION EVERY 12 HOURS PRN
Status: DISCONTINUED | OUTPATIENT
Start: 2024-06-21 | End: 2024-07-04 | Stop reason: HOSPADM

## 2024-06-21 RX ORDER — NALOXONE HCL 0.4 MG/ML
0.02 VIAL (ML) INJECTION
Status: DISCONTINUED | OUTPATIENT
Start: 2024-06-21 | End: 2024-07-04 | Stop reason: HOSPADM

## 2024-06-21 RX ORDER — IPRATROPIUM BROMIDE AND ALBUTEROL SULFATE 2.5; .5 MG/3ML; MG/3ML
3 SOLUTION RESPIRATORY (INHALATION) EVERY 6 HOURS PRN
COMMUNITY

## 2024-06-21 RX ORDER — TRIAMCINOLONE ACETONIDE 0.25 MG/G
CREAM TOPICAL DAILY
Status: DISCONTINUED | OUTPATIENT
Start: 2024-06-22 | End: 2024-06-22

## 2024-06-21 RX ORDER — ATORVASTATIN CALCIUM 10 MG/1
10 TABLET, FILM COATED ORAL DAILY
Status: DISCONTINUED | OUTPATIENT
Start: 2024-06-22 | End: 2024-07-04 | Stop reason: HOSPADM

## 2024-06-21 RX ORDER — METHYLPREDNISOLONE SOD SUCC 125 MG
125 VIAL (EA) INJECTION
Status: COMPLETED | OUTPATIENT
Start: 2024-06-21 | End: 2024-06-21

## 2024-06-21 RX ORDER — MULTIVITAMIN
1 TABLET ORAL DAILY
COMMUNITY

## 2024-06-21 RX ORDER — CETIRIZINE HYDROCHLORIDE 5 MG/1
5 TABLET ORAL DAILY
Status: DISCONTINUED | OUTPATIENT
Start: 2024-06-22 | End: 2024-07-04 | Stop reason: HOSPADM

## 2024-06-21 RX ORDER — IBUPROFEN 200 MG
16 TABLET ORAL
Status: DISCONTINUED | OUTPATIENT
Start: 2024-06-21 | End: 2024-07-04 | Stop reason: HOSPADM

## 2024-06-21 RX ORDER — IBUPROFEN 200 MG
24 TABLET ORAL
Status: DISCONTINUED | OUTPATIENT
Start: 2024-06-21 | End: 2024-07-04 | Stop reason: HOSPADM

## 2024-06-21 RX ORDER — BUTYROSPERMUM PARKII(SHEA BUTTER), SIMMONDSIA CHINENSIS (JOJOBA) SEED OIL, ALOE BARBADENSIS LEAF EXTRACT .01; 1; 3.5 G/100G; G/100G; G/100G
250 LIQUID TOPICAL DAILY
COMMUNITY

## 2024-06-21 RX ORDER — QUETIAPINE FUMARATE 25 MG/1
25 TABLET, FILM COATED ORAL NIGHTLY
Status: DISCONTINUED | OUTPATIENT
Start: 2024-06-21 | End: 2024-07-04 | Stop reason: HOSPADM

## 2024-06-21 RX ORDER — FAMOTIDINE 10 MG/ML
20 INJECTION INTRAVENOUS
Status: COMPLETED | OUTPATIENT
Start: 2024-06-21 | End: 2024-06-21

## 2024-06-21 RX ORDER — FAMOTIDINE 20 MG/1
20 TABLET, FILM COATED ORAL 2 TIMES DAILY PRN
Status: ON HOLD | COMMUNITY
End: 2024-07-03 | Stop reason: HOSPADM

## 2024-06-21 RX ORDER — HALOPERIDOL 5 MG/ML
1 INJECTION INTRAMUSCULAR ONCE
Status: COMPLETED | OUTPATIENT
Start: 2024-06-21 | End: 2024-06-21

## 2024-06-21 RX ORDER — LANOLIN ALCOHOL/MO/W.PET/CERES
1 CREAM (GRAM) TOPICAL DAILY
COMMUNITY

## 2024-06-21 RX ORDER — DIPHENHYDRAMINE HYDROCHLORIDE 50 MG/ML
25 INJECTION INTRAMUSCULAR; INTRAVENOUS
Status: COMPLETED | OUTPATIENT
Start: 2024-06-21 | End: 2024-06-21

## 2024-06-21 RX ORDER — HEPARIN SODIUM 5000 [USP'U]/ML
5000 INJECTION, SOLUTION INTRAVENOUS; SUBCUTANEOUS EVERY 8 HOURS
Status: DISCONTINUED | OUTPATIENT
Start: 2024-06-21 | End: 2024-07-04 | Stop reason: HOSPADM

## 2024-06-21 RX ORDER — INSULIN ASPART 100 [IU]/ML
0-5 INJECTION, SOLUTION INTRAVENOUS; SUBCUTANEOUS
Status: DISCONTINUED | OUTPATIENT
Start: 2024-06-21 | End: 2024-07-04 | Stop reason: HOSPADM

## 2024-06-21 RX ORDER — ACETAMINOPHEN 325 MG/1
650 TABLET ORAL EVERY 6 HOURS PRN
COMMUNITY

## 2024-06-21 RX ORDER — OXYCODONE HYDROCHLORIDE 5 MG/1
5 CAPSULE ORAL EVERY 6 HOURS PRN
Status: ON HOLD | COMMUNITY
End: 2024-07-03 | Stop reason: HOSPADM

## 2024-06-21 RX ORDER — ASCORBIC ACID 500 MG
500 TABLET ORAL 2 TIMES DAILY
COMMUNITY

## 2024-06-21 RX ORDER — BENZONATATE 200 MG/1
200 CAPSULE ORAL 3 TIMES DAILY PRN
COMMUNITY

## 2024-06-21 RX ORDER — AMLODIPINE BESYLATE 5 MG/1
5 TABLET ORAL DAILY
Status: ON HOLD | COMMUNITY
End: 2024-07-03 | Stop reason: HOSPADM

## 2024-06-21 RX ADMIN — QUETIAPINE FUMARATE 25 MG: 25 TABLET ORAL at 08:06

## 2024-06-21 RX ADMIN — SODIUM CHLORIDE, POTASSIUM CHLORIDE, SODIUM LACTATE AND CALCIUM CHLORIDE 1000 ML: 600; 310; 30; 20 INJECTION, SOLUTION INTRAVENOUS at 03:06

## 2024-06-21 RX ADMIN — HYDROXYZINE HYDROCHLORIDE 25 MG: 25 TABLET ORAL at 06:06

## 2024-06-21 RX ADMIN — FAMOTIDINE 20 MG: 10 INJECTION, SOLUTION INTRAVENOUS at 02:06

## 2024-06-21 RX ADMIN — HEPARIN SODIUM 5000 UNITS: 5000 INJECTION INTRAVENOUS; SUBCUTANEOUS at 09:06

## 2024-06-21 RX ADMIN — METHYLPREDNISOLONE SODIUM SUCCINATE 125 MG: 125 INJECTION, POWDER, FOR SOLUTION INTRAMUSCULAR; INTRAVENOUS at 02:06

## 2024-06-21 RX ADMIN — DIPHENHYDRAMINE HYDROCHLORIDE 25 MG: 50 INJECTION, SOLUTION INTRAMUSCULAR; INTRAVENOUS at 02:06

## 2024-06-21 RX ADMIN — HALOPERIDOL LACTATE 1 MG: 5 INJECTION, SOLUTION INTRAMUSCULAR at 11:06

## 2024-06-21 RX ADMIN — CEFEPIME 2 G: 2 INJECTION, POWDER, FOR SOLUTION INTRAVENOUS at 03:06

## 2024-06-21 NOTE — ED NOTES
Dahlia Spence, a 88 y.o. female presents to the ED w/ complaint of hives and itching for a few days has been taking a new antibiotic for a knee surgery she had and the family member tried to tell the nursing home a few times but they did not acknowledge her concers. Pt is oriented to self at this time which is her current baseline    Triage note:  Chief Complaint   Patient presents with    Allergic Reaction     Allergic reaction to Abx for celluitis, full body hives and itching, tongue swelling, no SOB, airway protected.      Review of patient's allergies indicates:   Allergen Reactions    Gabapentin Hallucinations     Past Medical History:   Diagnosis Date    Arthritis     Gout     High cholesterol     Hypertension

## 2024-06-21 NOTE — HPI
88 YOF w/CKD3, T2DM, COPD, PVD 2/2 to DM, HTN, HLD, LBP, OA who presented for a skin rash. The patient had a recent I&D (05/16/2024) for her R knee wound and started zosyn. Then 7 days ago she developed full body hives with rash and tongue swelling. Family reports AMS and is not at her baseline.     Recent history: right distal femur replacement on 04/29/2024, then I&D of RLE 5/16/24, then dehiscence of the right TKA and underwent a right gastroc flap, split thickness skin graft, and wound VAC on 05/23/2024. Then the patient had a post op infection and had a 10 day course of linezolid to daptomycin. Then she had a second post op infection treated with a 6 week course of zosyn on  05/30/2024 and was seen by ID.     In the ED: patient received fluid resuscitation and IV ceftriaxone. She exhibited signs of minimal skin desqaumation amidst normal vital signs.  WBC 20.56  HGB 12.6     K 3.0  CRT 5.0 (baseline 1.5)

## 2024-06-21 NOTE — ED PROVIDER NOTES
Encounter Date: 6/21/2024       History     Chief Complaint   Patient presents with    Allergic Reaction     Allergic reaction to Abx for celluitis, full body hives and itching, tongue swelling, no SOB, airway protected.      HPI  Dahlia Spence 71-year-old female with a history of hyperlipidemia, hypertension, low back pain, osteoarthritis, prediabetes, uses a walker for ambulation and recent I and D to her right knee with wound VAC status post infection currently on antibiotics for cellulitis presenting with concern for allergic reaction, full body hives, itching and tongue swelling.  Her symptoms began Sunday, worsened on Wednesday and are associated with hives, worsening rash and increase in symptoms Denies any fevers, denies any altered mental status although she appears somewhat confused from baseline according to her daughter.  She is full body hives extending from the bottom of her feet all the way up to her neck.  She has some mild tongue swelling but no stridor, oropharyngeal edema, erythema or nasal pharyngeal edema.  There was no shortness of breath or wheezing noted.  She denies any falls or trauma.  She denies any active chest pain.  She is currently on IV Zosyn for 6 weeks.    Review of patient's allergies indicates:   Allergen Reactions    Gabapentin Hallucinations     Past Medical History:   Diagnosis Date    Arthritis     Gout     High cholesterol     Hypertension      Past Surgical History:   Procedure Laterality Date    ANKLE SURGERY      CHOLECYSTECTOMY      HYSTERECTOMY       No family history on file.  Social History     Tobacco Use    Smoking status: Never    Smokeless tobacco: Never   Substance Use Topics    Alcohol use: No    Drug use: No     Review of Systems  All other systems reviewed and were negative; see HPI also for additional ROS.    Physical Exam     Initial Vitals [06/21/24 1257]   BP Pulse Resp Temp SpO2   (!) 145/59 75 20 97.9 °F (36.6 °C) 99 %      MAP       --          Physical Exam    Nursing note and vitals reviewed.      Gen/Constitutional: Interactive.  Chronically ill-appearing, moderate emotional distress  Head: Normocephalic, Atraumatic  Neck: supple, no masses or LAD, no JVD  Eyes: PERRLA, conjunctiva clear  Ears, Nose and Throat: No rhinorrhea or stridor.  Cardiac:  Regular rate, Reg Rhythm, No murmur  Pulmonary: CTA Bilat, no wheezes, rhonchi, rales.  No increased work of breathing.  GI: Abdomen soft, non-tender, non-distended; no rebound or guarding  : No CVA tenderness.  Musculoskeletal: Extremities warm, well perfused, no erythema, no edema  Skin:  There is erythema, rash and hives present over the entire body.  Minimal desquamation, right knee with bandages postop  Neuro: Alert and Oriented x 2, with intermittent disorientation; No focal motor or sensory deficits.    Psych: Normal affect      ED Course   Procedures  Labs Reviewed   CBC W/ AUTO DIFFERENTIAL - Abnormal; Notable for the following components:       Result Value    WBC 20.56 (*)     RBC 3.94 (*)     MCH 32.0 (*)     RDW 22.3 (*)     Immature Granulocytes 4.6 (*)     Gran # (ANC) 14.7 (*)     Immature Grans (Abs) 0.95 (*)     Eos # 1.4 (*)     nRBC 1 (*)     Lymph % 14.8 (*)     Mono % 1.7 (*)     All other components within normal limits   COMPREHENSIVE METABOLIC PANEL - Abnormal; Notable for the following components:    Potassium 3.0 (*)     CO2 12 (*)     Glucose 119 (*)     BUN 87 (*)     Creatinine 5.0 (*)     Calcium 8.0 (*)     Albumin 1.8 (*)     ALT 5 (*)     eGFR 7.9 (*)     Anion Gap 21 (*)     All other components within normal limits   C-REACTIVE PROTEIN - Abnormal; Notable for the following components:    .7 (*)     All other components within normal limits   SEDIMENTATION RATE - Abnormal; Notable for the following components:    Sed Rate 61 (*)     All other components within normal limits   ISTAT PROCEDURE - Abnormal; Notable for the following components:    POC Glucose 115 (*)      POC BUN 96 (*)     POC Creatinine 5.6 (*)     POC Potassium 2.7 (*)     POC TCO2 (MEASURED) 13 (*)     POC Ionized Calcium 1.03 (*)     All other components within normal limits   ISTAT LACTATE - Abnormal; Notable for the following components:    POC Lactate 2.77 (*)     All other components within normal limits   CULTURE, BLOOD   CULTURE, BLOOD   HIV 1 / 2 ANTIBODY    Narrative:     Release to patient->Immediate   HEPATITIS C ANTIBODY    Narrative:     Release to patient->Immediate   MAGNESIUM   URINALYSIS, REFLEX TO URINE CULTURE   LACTIC ACID, PLASMA   ISTAT CHEM8          Imaging Results    None          Medications   hydrOXYzine HCL tablet 25 mg (25 mg Oral Given 6/21/24 1808)   QUEtiapine tablet 25 mg (has no administration in time range)   sodium chloride 0.9% flush 10 mL (has no administration in time range)   naloxone 0.4 mg/mL injection 0.02 mg (has no administration in time range)   glucose chewable tablet 16 g (has no administration in time range)   glucose chewable tablet 24 g (has no administration in time range)   glucagon (human recombinant) injection 1 mg (has no administration in time range)   heparin (porcine) injection 5,000 Units (has no administration in time range)   insulin aspart U-100 pen 0-5 Units (has no administration in time range)   dextrose 10% bolus 125 mL 125 mL (has no administration in time range)   dextrose 10% bolus 250 mL 250 mL (has no administration in time range)   atorvastatin tablet 10 mg (has no administration in time range)   methylPREDNISolone sodium succinate injection 125 mg (125 mg Intravenous Given 6/21/24 1454)   famotidine (PF) injection 20 mg (20 mg Intravenous Given 6/21/24 1454)   diphenhydrAMINE injection 25 mg (25 mg Intravenous Given 6/21/24 1454)   lactated ringers bolus 1,000 mL (0 mLs Intravenous Stopped 6/21/24 1652)   ceFEPIme (MAXIPIME) 2 g in D5W 100 mL IVPB (MB+) (0 g Intravenous Stopped 6/21/24 1621)     Medical Decision Making  Dahlia Spence  71-year-old female with a history of hyperlipidemia, hypertension, low back pain, osteoarthritis, prediabetes, uses a walker for ambulation and recent I and D to her right knee with wound VAC status post infection currently on antibiotics for cellulitis presenting with concern for allergic reaction, full body hives, itching and tongue swelling.     Differential diagnosis:  Drug reaction, anaphylactic reaction, allergic reaction, oropharyngeal edema,TENS, SJS, sepsis, electrolyte abnormality, encephalopathy    Amount and/or Complexity of Data Reviewed  Independent Historian: caregiver     Details: Patient's daughter at bedside  Labs: ordered.     Details: Elevated lactate 2.77, WBC 20.6 with left shift potassium 3.0, creatinine 5.0, BUN 87 anion gap 21 , ESR 60  Radiology: ordered and independent interpretation performed.  ECG/medicine tests: ordered and independent interpretation performed.    Risk  Prescription drug management.  Drug therapy requiring intensive monitoring for toxicity.  Decision regarding hospitalization.    Emergent evaluation of patient presenting with new rash with full body hives, itching and reported tongue swelling.  Placed on cardiac and telemetry monitoring including pulse oximetry.  Physical exam findings with widespread rash with hives from lower extremity all the way up to the neck.  No significant desquamation, negative Nikolsky sign.  Suspected SJS versus TENs however unlikely but will continue to monitor.  No airway involvement and without stridor.  No oropharyngeal edema.  No hypoxemia.  She does have periods of disorientation and then reorientation.  Recently treated for cellulitis on Zosyn but now discontinue secondary to new hives and possibly drug-induced rash.  Broad workup including lab work and inflammatory markers.  ECG without ischemia, lactate elevated to 2.8, WBC elevated to 21 with left shift.  She has a new acute renal failure with BUN 87, creatinine 5 and an  anion gap of 21.  Inflammatory markers are elevated significantly.  Will give trial of cefepime and continued monitoring.  Discussed case with hospital medicine, will admit for ongoing management.                                  Clinical Impression:  Final diagnoses:  [T78.40XA] Allergic reaction  [L03.115] Cellulitis of knee, right (Primary)  [L50.9] Hives  [T78.40XA] Allergic reaction to drug, initial encounter  [R21] Rash          ED Disposition Condition    Observation Stable               Wilmer Watts DO, [facility]  Emergency Staff Physician   Dept of Emergency Medicine   [facility]  Spectralink: [id number]        Disclaimer: This note has been generated using voice-recognition software. There may be typographical errors that have been missed during proof-reading.       Wilmer Watts DO  06/21/24 2008

## 2024-06-21 NOTE — ED NOTES
Nurses Note -- 4 Eyes      6/21/2024   6:51 PM      Skin assessed during: Admit      [] No Altered Skin Integrity Present    []Prevention Measures Documented      [x] Yes- Altered Skin Integrity Present or Discovered   [x] LDA Added if Not in Epic (Describe Wound)   [x] New Altered Skin Integrity was Present on Admit and Documented in LDA   [x] Wound Image Taken    Wound Care Consulted? Yes    Attending Nurse:  Lien Gomes RN/Staff Member:   Quiana

## 2024-06-22 PROBLEM — T14.8XXA INFECTED WOUND: Status: ACTIVE | Noted: 2024-06-22

## 2024-06-22 PROBLEM — S81.001A OPEN WOUND OF RIGHT KNEE: Status: ACTIVE | Noted: 2024-06-22

## 2024-06-22 PROBLEM — E87.29 METABOLIC ACIDOSIS, INCREASED ANION GAP: Status: ACTIVE | Noted: 2024-06-22

## 2024-06-22 PROBLEM — L08.9 INFECTED WOUND: Status: ACTIVE | Noted: 2024-06-22

## 2024-06-22 PROBLEM — N17.9 AKI (ACUTE KIDNEY INJURY): Status: ACTIVE | Noted: 2024-06-22

## 2024-06-22 LAB
ALBUMIN SERPL BCP-MCNC: 1.8 G/DL (ref 3.5–5.2)
ALBUMIN SERPL BCP-MCNC: 1.8 G/DL (ref 3.5–5.2)
ALLENS TEST: ABNORMAL
ALP SERPL-CCNC: 67 U/L (ref 55–135)
ALP SERPL-CCNC: 81 U/L (ref 55–135)
ALT SERPL W/O P-5'-P-CCNC: 5 U/L (ref 10–44)
ALT SERPL W/O P-5'-P-CCNC: 5 U/L (ref 10–44)
ANION GAP SERPL CALC-SCNC: 22 MMOL/L (ref 8–16)
ANION GAP SERPL CALC-SCNC: 24 MMOL/L (ref 8–16)
ANISOCYTOSIS BLD QL SMEAR: SLIGHT
APAP SERPL-MCNC: <3 UG/ML (ref 10–20)
AST SERPL-CCNC: 14 U/L (ref 10–40)
AST SERPL-CCNC: 18 U/L (ref 10–40)
B-OH-BUTYR BLD STRIP-SCNC: 2.7 MMOL/L (ref 0–0.5)
BACTERIA #/AREA URNS AUTO: ABNORMAL /HPF
BASOPHILS # BLD AUTO: ABNORMAL K/UL (ref 0–0.2)
BASOPHILS # BLD AUTO: ABNORMAL K/UL (ref 0–0.2)
BASOPHILS NFR BLD: 0 % (ref 0–1.9)
BASOPHILS NFR BLD: 0 % (ref 0–1.9)
BILIRUB SERPL-MCNC: 0.2 MG/DL (ref 0.1–1)
BILIRUB SERPL-MCNC: 0.2 MG/DL (ref 0.1–1)
BILIRUB UR QL STRIP: NEGATIVE
BUN SERPL-MCNC: 97 MG/DL (ref 8–23)
BUN SERPL-MCNC: 97 MG/DL (ref 8–23)
CALCIUM SERPL-MCNC: 7.8 MG/DL (ref 8.7–10.5)
CALCIUM SERPL-MCNC: 7.8 MG/DL (ref 8.7–10.5)
CHLORIDE SERPL-SCNC: 105 MMOL/L (ref 95–110)
CHLORIDE SERPL-SCNC: 108 MMOL/L (ref 95–110)
CK SERPL-CCNC: 45 U/L (ref 20–180)
CLARITY UR REFRACT.AUTO: ABNORMAL
CO2 SERPL-SCNC: 7 MMOL/L (ref 23–29)
CO2 SERPL-SCNC: 9 MMOL/L (ref 23–29)
COLOR UR AUTO: YELLOW
CREAT SERPL-MCNC: 5.1 MG/DL (ref 0.5–1.4)
CREAT SERPL-MCNC: 5.4 MG/DL (ref 0.5–1.4)
CREAT UR-MCNC: 127 MG/DL (ref 15–325)
CREAT UR-MCNC: 98 MG/DL (ref 15–325)
DELSYS: ABNORMAL
DIFFERENTIAL METHOD BLD: ABNORMAL
DIFFERENTIAL METHOD BLD: ABNORMAL
EOSINOPHIL # BLD AUTO: ABNORMAL K/UL (ref 0–0.5)
EOSINOPHIL # BLD AUTO: ABNORMAL K/UL (ref 0–0.5)
EOSINOPHIL NFR BLD: 0 % (ref 0–8)
EOSINOPHIL NFR BLD: 2 % (ref 0–8)
ERYTHROCYTE [DISTWIDTH] IN BLOOD BY AUTOMATED COUNT: 22.1 % (ref 11.5–14.5)
ERYTHROCYTE [DISTWIDTH] IN BLOOD BY AUTOMATED COUNT: 22.2 % (ref 11.5–14.5)
EST. GFR  (NO RACE VARIABLE): 7.2 ML/MIN/1.73 M^2
EST. GFR  (NO RACE VARIABLE): 7.7 ML/MIN/1.73 M^2
GLUCOSE SERPL-MCNC: 127 MG/DL (ref 70–110)
GLUCOSE SERPL-MCNC: 128 MG/DL (ref 70–110)
GLUCOSE UR QL STRIP: NEGATIVE
HCO3 UR-SCNC: 12.8 MMOL/L (ref 24–28)
HCT VFR BLD AUTO: 28.8 % (ref 37–48.5)
HCT VFR BLD AUTO: 34.9 % (ref 37–48.5)
HCT VFR BLD CALC: 32 %PCV (ref 36–54)
HGB BLD-MCNC: 11.6 G/DL (ref 12–16)
HGB BLD-MCNC: 9.9 G/DL (ref 12–16)
HGB UR QL STRIP: ABNORMAL
HYALINE CASTS UR QL AUTO: 0 /LPF
IMM GRANULOCYTES # BLD AUTO: ABNORMAL K/UL (ref 0–0.04)
IMM GRANULOCYTES # BLD AUTO: ABNORMAL K/UL (ref 0–0.04)
IMM GRANULOCYTES NFR BLD AUTO: ABNORMAL % (ref 0–0.5)
IMM GRANULOCYTES NFR BLD AUTO: ABNORMAL % (ref 0–0.5)
KETONES UR QL STRIP: ABNORMAL
LACTATE SERPL-SCNC: 1.8 MMOL/L (ref 0.5–2.2)
LEUKOCYTE ESTERASE UR QL STRIP: ABNORMAL
LYMPHOCYTES # BLD AUTO: ABNORMAL K/UL (ref 1–4.8)
LYMPHOCYTES # BLD AUTO: ABNORMAL K/UL (ref 1–4.8)
LYMPHOCYTES NFR BLD: 11 % (ref 18–48)
LYMPHOCYTES NFR BLD: 20 % (ref 18–48)
MAGNESIUM SERPL-MCNC: 2 MG/DL (ref 1.6–2.6)
MCH RBC QN AUTO: 32 PG (ref 27–31)
MCH RBC QN AUTO: 32.1 PG (ref 27–31)
MCHC RBC AUTO-ENTMCNC: 33.2 G/DL (ref 32–36)
MCHC RBC AUTO-ENTMCNC: 34.4 G/DL (ref 32–36)
MCV RBC AUTO: 94 FL (ref 82–98)
MCV RBC AUTO: 96 FL (ref 82–98)
MICROSCOPIC COMMENT: ABNORMAL
MODE: ABNORMAL
MONOCYTES # BLD AUTO: ABNORMAL K/UL (ref 0.3–1)
MONOCYTES # BLD AUTO: ABNORMAL K/UL (ref 0.3–1)
MONOCYTES NFR BLD: 0 % (ref 4–15)
MONOCYTES NFR BLD: 2 % (ref 4–15)
NEUTROPHILS NFR BLD: 71 % (ref 38–73)
NEUTROPHILS NFR BLD: 83 % (ref 38–73)
NEUTS BAND NFR BLD MANUAL: 4 %
NEUTS BAND NFR BLD MANUAL: 7 %
NITRITE UR QL STRIP: NEGATIVE
NRBC BLD-RTO: 1 /100 WBC
NRBC BLD-RTO: 1 /100 WBC
PATH REV BLD -IMP: NORMAL
PCO2 BLDA: 33.2 MMHG (ref 35–45)
PH SMN: 7.2 [PH] (ref 7.35–7.45)
PH UR STRIP: 5 [PH] (ref 5–8)
PHOSPHATE SERPL-MCNC: 9 MG/DL (ref 2.7–4.5)
PLATELET # BLD AUTO: 203 K/UL (ref 150–450)
PLATELET # BLD AUTO: 221 K/UL (ref 150–450)
PLATELET BLD QL SMEAR: ABNORMAL
PLATELET BLD QL SMEAR: ABNORMAL
PMV BLD AUTO: 11.3 FL (ref 9.2–12.9)
PMV BLD AUTO: 11.8 FL (ref 9.2–12.9)
PO2 BLDA: 27 MMHG (ref 40–60)
POC BE: -15 MMOL/L
POC SATURATED O2: 37 % (ref 95–100)
POC TCO2: 14 MMOL/L (ref 24–29)
POCT GLUCOSE: 150 MG/DL (ref 70–110)
POCT GLUCOSE: 151 MG/DL (ref 70–110)
POCT GLUCOSE: 161 MG/DL (ref 70–110)
POCT GLUCOSE: 169 MG/DL (ref 70–110)
POLYCHROMASIA BLD QL SMEAR: ABNORMAL
POTASSIUM SERPL-SCNC: 3.3 MMOL/L (ref 3.5–5.1)
POTASSIUM SERPL-SCNC: 3.4 MMOL/L (ref 3.5–5.1)
PROT SERPL-MCNC: 5.9 G/DL (ref 6–8.4)
PROT SERPL-MCNC: 6 G/DL (ref 6–8.4)
PROT UR QL STRIP: ABNORMAL
PROT UR-MCNC: 69 MG/DL (ref 0–15)
PROT/CREAT UR: 0.7 MG/G{CREAT} (ref 0–0.2)
RBC # BLD AUTO: 3.08 M/UL (ref 4–5.4)
RBC # BLD AUTO: 3.63 M/UL (ref 4–5.4)
RBC #/AREA URNS AUTO: 16 /HPF (ref 0–4)
SALICYLATES SERPL-MCNC: <5 MG/DL (ref 15–30)
SAMPLE: ABNORMAL
SITE: ABNORMAL
SODIUM SERPL-SCNC: 136 MMOL/L (ref 136–145)
SODIUM SERPL-SCNC: 139 MMOL/L (ref 136–145)
SODIUM UR-SCNC: 19 MMOL/L (ref 20–250)
SP GR UR STRIP: 1.02 (ref 1–1.03)
SQUAMOUS #/AREA URNS AUTO: 31 /HPF
URN SPEC COLLECT METH UR: ABNORMAL
WBC # BLD AUTO: 16.83 K/UL (ref 3.9–12.7)
WBC # BLD AUTO: 24.06 K/UL (ref 3.9–12.7)
WBC #/AREA URNS AUTO: 4 /HPF (ref 0–5)
YEAST UR QL AUTO: ABNORMAL

## 2024-06-22 PROCEDURE — 83735 ASSAY OF MAGNESIUM: CPT

## 2024-06-22 PROCEDURE — 88305 TISSUE EXAM BY PATHOLOGIST: CPT | Performed by: DERMATOLOGY

## 2024-06-22 PROCEDURE — 99223 1ST HOSP IP/OBS HIGH 75: CPT | Mod: ,,, | Performed by: HOSPITALIST

## 2024-06-22 PROCEDURE — 36415 COLL VENOUS BLD VENIPUNCTURE: CPT

## 2024-06-22 PROCEDURE — 81001 URINALYSIS AUTO W/SCOPE: CPT | Performed by: EMERGENCY MEDICINE

## 2024-06-22 PROCEDURE — 99900035 HC TECH TIME PER 15 MIN (STAT)

## 2024-06-22 PROCEDURE — 99223 1ST HOSP IP/OBS HIGH 75: CPT | Mod: ,,, | Performed by: PHYSICIAN ASSISTANT

## 2024-06-22 PROCEDURE — 82010 KETONE BODYS QUAN: CPT | Performed by: STUDENT IN AN ORGANIZED HEALTH CARE EDUCATION/TRAINING PROGRAM

## 2024-06-22 PROCEDURE — 80143 DRUG ASSAY ACETAMINOPHEN: CPT | Performed by: STUDENT IN AN ORGANIZED HEALTH CARE EDUCATION/TRAINING PROGRAM

## 2024-06-22 PROCEDURE — 82803 BLOOD GASES ANY COMBINATION: CPT

## 2024-06-22 PROCEDURE — 85060 BLOOD SMEAR INTERPRETATION: CPT | Mod: ,,, | Performed by: PATHOLOGY

## 2024-06-22 PROCEDURE — 80179 DRUG ASSAY SALICYLATE: CPT | Performed by: STUDENT IN AN ORGANIZED HEALTH CARE EDUCATION/TRAINING PROGRAM

## 2024-06-22 PROCEDURE — 85027 COMPLETE CBC AUTOMATED: CPT | Mod: 91

## 2024-06-22 PROCEDURE — 88305 TISSUE EXAM BY PATHOLOGIST: CPT | Mod: 26,,, | Performed by: DERMATOLOGY

## 2024-06-22 PROCEDURE — 51702 INSERT TEMP BLADDER CATH: CPT

## 2024-06-22 PROCEDURE — 20600001 HC STEP DOWN PRIVATE ROOM

## 2024-06-22 PROCEDURE — 63600175 PHARM REV CODE 636 W HCPCS: Performed by: HOSPITALIST

## 2024-06-22 PROCEDURE — 84156 ASSAY OF PROTEIN URINE: CPT | Performed by: HOSPITALIST

## 2024-06-22 PROCEDURE — 85007 BL SMEAR W/DIFF WBC COUNT: CPT

## 2024-06-22 PROCEDURE — 25000003 PHARM REV CODE 250

## 2024-06-22 PROCEDURE — 82550 ASSAY OF CK (CPK): CPT | Performed by: STUDENT IN AN ORGANIZED HEALTH CARE EDUCATION/TRAINING PROGRAM

## 2024-06-22 PROCEDURE — 63600175 PHARM REV CODE 636 W HCPCS

## 2024-06-22 PROCEDURE — 96372 THER/PROPH/DIAG INJ SC/IM: CPT

## 2024-06-22 PROCEDURE — 84100 ASSAY OF PHOSPHORUS: CPT

## 2024-06-22 PROCEDURE — 82570 ASSAY OF URINE CREATININE: CPT | Performed by: HOSPITALIST

## 2024-06-22 PROCEDURE — 0HB5XZX EXCISION OF CHEST SKIN, EXTERNAL APPROACH, DIAGNOSTIC: ICD-10-PCS | Performed by: DERMATOLOGY

## 2024-06-22 PROCEDURE — 83605 ASSAY OF LACTIC ACID: CPT

## 2024-06-22 PROCEDURE — 80053 COMPREHEN METABOLIC PANEL: CPT | Mod: 91

## 2024-06-22 RX ORDER — SODIUM CHLORIDE, SODIUM LACTATE, POTASSIUM CHLORIDE, CALCIUM CHLORIDE 600; 310; 30; 20 MG/100ML; MG/100ML; MG/100ML; MG/100ML
INJECTION, SOLUTION INTRAVENOUS CONTINUOUS
Status: DISCONTINUED | OUTPATIENT
Start: 2024-06-22 | End: 2024-06-22

## 2024-06-22 RX ORDER — INDOMETHACIN 25 MG/1
150 CAPSULE ORAL ONCE
Status: DISCONTINUED | OUTPATIENT
Start: 2024-06-22 | End: 2024-06-22

## 2024-06-22 RX ORDER — SODIUM CHLORIDE 9 MG/ML
INJECTION, SOLUTION INTRAVENOUS CONTINUOUS
Status: DISCONTINUED | OUTPATIENT
Start: 2024-06-22 | End: 2024-06-22

## 2024-06-22 RX ORDER — DEXTROSE MONOHYDRATE 50 MG/ML
INJECTION, SOLUTION INTRAVENOUS CONTINUOUS
Status: DISCONTINUED | OUTPATIENT
Start: 2024-06-22 | End: 2024-06-22

## 2024-06-22 RX ORDER — TRIAMCINOLONE ACETONIDE 1 MG/G
CREAM TOPICAL 2 TIMES DAILY
Status: DISCONTINUED | OUTPATIENT
Start: 2024-06-22 | End: 2024-07-04 | Stop reason: HOSPADM

## 2024-06-22 RX ORDER — POTASSIUM CHLORIDE 7.45 MG/ML
10 INJECTION INTRAVENOUS
Status: COMPLETED | OUTPATIENT
Start: 2024-06-22 | End: 2024-06-22

## 2024-06-22 RX ORDER — INDOMETHACIN 25 MG/1
100 CAPSULE ORAL ONCE
Status: DISCONTINUED | OUTPATIENT
Start: 2024-06-22 | End: 2024-06-22

## 2024-06-22 RX ADMIN — SODIUM BICARBONATE: 84 INJECTION, SOLUTION INTRAVENOUS at 07:06

## 2024-06-22 RX ADMIN — POTASSIUM CHLORIDE 10 MEQ: 7.46 INJECTION, SOLUTION INTRAVENOUS at 08:06

## 2024-06-22 RX ADMIN — TRIAMCINOLONE ACETONIDE: 1 CREAM TOPICAL at 10:06

## 2024-06-22 RX ADMIN — POTASSIUM CHLORIDE 10 MEQ: 7.46 INJECTION, SOLUTION INTRAVENOUS at 07:06

## 2024-06-22 RX ADMIN — HEPARIN SODIUM 5000 UNITS: 5000 INJECTION INTRAVENOUS; SUBCUTANEOUS at 10:06

## 2024-06-22 RX ADMIN — QUETIAPINE FUMARATE 25 MG: 25 TABLET ORAL at 08:06

## 2024-06-22 RX ADMIN — SODIUM CHLORIDE 1000 ML: 9 INJECTION, SOLUTION INTRAVENOUS at 09:06

## 2024-06-22 RX ADMIN — HEPARIN SODIUM 5000 UNITS: 5000 INJECTION INTRAVENOUS; SUBCUTANEOUS at 06:06

## 2024-06-22 RX ADMIN — POTASSIUM CHLORIDE 10 MEQ: 7.46 INJECTION, SOLUTION INTRAVENOUS at 09:06

## 2024-06-22 RX ADMIN — CETIRIZINE HYDROCHLORIDE 5 MG: 5 TABLET, FILM COATED ORAL at 09:06

## 2024-06-22 RX ADMIN — HEPARIN SODIUM 5000 UNITS: 5000 INJECTION INTRAVENOUS; SUBCUTANEOUS at 03:06

## 2024-06-22 RX ADMIN — TRIAMCINOLONE ACETONIDE: 0.25 CREAM TOPICAL at 10:06

## 2024-06-22 RX ADMIN — ATORVASTATIN CALCIUM 10 MG: 10 TABLET, FILM COATED ORAL at 09:06

## 2024-06-22 RX ADMIN — POTASSIUM CHLORIDE 10 MEQ: 7.46 INJECTION, SOLUTION INTRAVENOUS at 10:06

## 2024-06-22 RX ADMIN — SODIUM CHLORIDE, POTASSIUM CHLORIDE, SODIUM LACTATE AND CALCIUM CHLORIDE 1000 ML: 600; 310; 30; 20 INJECTION, SOLUTION INTRAVENOUS at 05:06

## 2024-06-22 NOTE — PHARMACY MED REC
"        Admission Medication History     The home medication history was taken by Johan Stone.    You may go to "Admission" then "Reconcile Home Medications" tabs to review and/or act upon these items.     The home medication list has been updated by the Pharmacy department.   Please read ALL comments highlighted in yellow.   Please address this information as you see fit.    Feel free to contact us if you have any questions or require assistance.      The medications listed below were removed from the home medication list. Please reorder if appropriate:  Patient reports no longer taking the following medication(s):  CELEBREX 200 MG CAPSULE  CETIRIZINE 10 MG TABLET  HYDROCODONE-ACETAMINOPHEN 5-325 MG TABLET  NYSTATIN CREAM    Medications listed below were obtained from: Nursing home  PTA Medications   Medication Sig    acetaminophen (TYLENOL) 325 MG tablet   Give 650 mg by mouth every 6 (six) hours as needed for pain.    albuterol-ipratropium (DUO-NEB) 2.5 mg-0.5 mg/3 mL nebulizer solution   Give 3 mLs by nebulization every 6 (six) hours as needed for wheezing. Rescue    allopurinoL (ZYLOPRIM) 100 MG tablet   Give 100 mg by mouth 2 (two) times daily.    amLODIPine (NORVASC) 5 MG tablet   Give 5 mg by mouth once daily.    ascorbic acid, vitamin C, (VITAMIN C) 500 MG tablet   Give 500 mg by mouth 2 (two) times daily.    aspirin (ECOTRIN) 81 MG EC tablet   Give 81 mg by mouth once daily.    benzonatate (TESSALON) 200 MG capsule   Give 200 mg by mouth 3 (three) times daily as needed for cough.    famotidine (PEPCID) 20 MG tablet   Give 20 mg by mouth 2 (two) times daily as needed for heartburn.    ferrous sulfate (FEOSOL) Tab tablet   Give 1 tablet by mouth once daily.    lisinopriL-hydrochlorothiazide (PRINZIDE,ZESTORETIC) 20-12.5 mg per tablet   Give 1 tablet by mouth once daily.    multivitamin with folic acid (HIGH POTENCY MULTIVITAMIN) 400 mcg Tab   Give 1 tablet by mouth once daily.    oxyCODONE (OXY-IR) 5 mg " Cap   Give 5 mg by mouth every 6 (six) hours as needed for pain.    risedronate (ACTONEL) 35 MG tablet   Give 35 mg by mouth every 7 days on Friday.    Saccharomyces boulardii (FLORASTOR) 250 mg capsule   Give 250 mg by mouth once daily.    simvastatin (ZOCOR) 20 MG tablet   Give 1 tablet by mouth every evening.           Johan Stone  EXT [id number]          .

## 2024-06-22 NOTE — HPI
Dahlia Spence is a 88 y.o. female with a PMHx of CKD3, T2DM, COPD, PVD, HTN, HLD who is currently admitted for drug-induced systemic response and hives. Patient had a right TKA done by Dr. Hernandez on 4/22/24.  Unfortunately, on the day of surgery while working with physical therapy she had a fall and suffered a periprosthetic distal femur fracture.  She underwent  right distal femur replacement on 04/29/2024 which was complicated by wound dehiscence an infection.  Her cultures grew Pseudomonas and Proteus.  She subsequently underwent I&D of RLE with wound VAC application on 5/16/24.  Plastic surgery performed a right gastroc flap, split thickness skin graft on 05/23/2024 and placed a wound VAC. Since that time, the patient has been on IV Zosyn (end date 06/27/2024) and has been receiving local wound care with Dakin's and gentamicin ointment. Her flap has not fully granulated and she continues to have purulent drainage.     She is currently residing at [facility]  Smoking: nonsmoker since the 1980's. Prior 10 pack year history   No history of IV drug use.    She does not take any immunosuppressant medications

## 2024-06-22 NOTE — ASSESSMENT & PLAN NOTE
Possibly drug induced. No eosinophilla   Will continue to monitor  May start abx, pending ID recs    - ID  consult  - antihistamine, topical steroid  - derm consulted for drug reaction and topical corticosteroid recs  - triamcinolone daily

## 2024-06-22 NOTE — PROGRESS NOTES
[facility] - Transplant [facility] Medicine  Progress Note    Patient Name: Dahlia Spence  MRN: [id number]  Patient Class: OP- Observation   Admission Date: 6/21/2024  Length of Stay: 0 days  Attending Physician: Dwight Granados MD  Primary Care Provider: Frandy Roe MD        Subjective:     Principal Problem:<principal problem not specified>        HPI:  88 YOF w/CKD3, T2DM, COPD, PVD 2/2 to DM, HTN, HLD, LBP, OA who presented for a skin rash. The patient had a recent I&D (05/16/2024) for her R knee wound and started zosyn. Then 7 days ago she developed full body hives with rash and tongue swelling. Family reports AMS and is not at her baseline.     Recent history: right distal femur replacement on 04/29/2024, then I&D of RLE 5/16/24, then dehiscence of the right TKA and underwent a right gastroc flap, split thickness skin graft, and wound VAC on 05/23/2024. Then the patient had a post op infection and had a 10 day course of linezolid to daptomycin. Then she had a second post op infection treated with a 6 week course of zosyn on  05/30/2024 and was seen by ID.     In the ED: patient received fluid resuscitation and IV ceftriaxone. She exhibited signs of minimal skin desqaumation amidst normal vital signs.  WBC 20.56  HGB 12.6     K 3.0  CRT 5.0 (baseline 1.5)    Overview/Hospital Course:  Patient was admitted and dermatology consultation was sent along with ID, nephrology and orthopedics. An infected knee wound was found on her right side which was oozing pus, which is why ortho was consulted. In regards to antibiotic selection, ID is on board as patient has gone through an extensive antibiotic regimen the last 6 weeks and still appears to have an infected knee wound with AMS. AMS could be due to dehydration, JOSÉ, infection or a combination of them. Will continue to assess. On seroquel for the time being which she already takes at home. CT head ordered. CRT neetu to >5 from a baseline of  1.5 a few weeks ago.    Interval History: patient is still altered. Knee pain ongoing. Rash is still evident and widespread.    Review of Systems  Objective:     Vital Signs (Most Recent):  Temp: 97.6 °F (36.4 °C) (06/22/24 1157)  Pulse: 73 (06/22/24 1157)  Resp: 18 (06/22/24 1157)  BP: (!) 130/58 (06/22/24 1157)  SpO2: 100 % (06/22/24 1157) Vital Signs (24h Range):  Temp:  [96.5 °F (35.8 °C)-98 °F (36.7 °C)] 97.6 °F (36.4 °C)  Pulse:  [69-89] 73  Resp:  [16-20] 18  SpO2:  [91 %-100 %] 100 %  BP: (104-149)/() 130/58     Weight: 84.4 kg (186 lb)  Body mass index is 34.02 kg/m².    Intake/Output Summary (Last 24 hours) at 6/22/2024 1336  Last data filed at 6/22/2024 1210  Gross per 24 hour   Intake 2059 ml   Output 100 ml   Net 1959 ml         Physical Exam  HENT:      Head: Normocephalic and atraumatic.   Eyes:      Pupils: Pupils are equal, round, and reactive to light.   Cardiovascular:      Rate and Rhythm: Normal rate and regular rhythm.      Pulses: Normal pulses.      Heart sounds: Normal heart sounds.   Pulmonary:      Breath sounds: Normal breath sounds.   Abdominal:      General: Bowel sounds are normal.      Palpations: Abdomen is soft.   Musculoskeletal:      Right lower leg: No edema.      Left lower leg: No edema.   Skin:     General: Skin is warm.      Capillary Refill: Capillary refill takes less than 2 seconds.      Findings: Erythema and rash present.      Comments: Unchanged from yesterday   Neurological:      Mental Status: She is alert. She is disoriented.             Significant Labs: All pertinent labs within the past 24 hours have been reviewed.    Significant Imaging: I have reviewed all pertinent imaging results/findings within the past 24 hours.    Assessment/Plan:      Infected wound  Right knee wound (underwent knee replacement) is erythematous and draining pus. She has along history of wound infection that has kept her on chronic antibiotics in the past 2 months    Plan:  Ortho  consultation        Hives  Possibly drug induced. No eosinophilla   Will continue to monitor  May start abx, pending ID recs    - ID  consult  - antihistamine, topical steroid  - derm consulted for drug reaction and topical corticosteroid recs  - triamcinolone daily         Type 2 diabetes mellitus with other circulatory complications  Low dose ssi as needed prn  HBA1C      Chronic obstructive pulmonary disease, unspecified  Supplemental oxygen  Duo-nebs  Target SPO2 >88%    Chronic kidney disease, stage III (moderate)  Patient's CRT today is 5.0 >>5.4  Baseline in the system is 1.5  Acidosis with ph 7.1  HCO3 is 9  Phos 9    Plan:  - Nephrology consult, now  - urine studies pending     Benign essential hypertension  Chronic, controlled. Latest blood pressure and vitals reviewed-     Temp:  [96.5 °F (35.8 °C)-98 °F (36.7 °C)]   Pulse:  [69-89]   Resp:  [16-20]   BP: (104-149)/()   SpO2:  [91 %-100 %] .   Home meds for hypertension were reviewed and noted below.   Hypertension Medications               lisinopriL-hydrochlorothiazide (PRINZIDE,ZESTORETIC) 20-12.5 mg per tablet TAKE 1 TABLET BY MOUTH EVERY DAY            While in the hospital, will manage blood pressure as follows; Adjust home antihypertensive regimen as follows- HOLD    Will utilize p.r.n. blood pressure medication only if patient's blood pressure greater than 180/110 and she develops symptoms such as worsening chest pain or shortness of breath.      VTE Risk Mitigation (From admission, onward)           Ordered     heparin (porcine) injection 5,000 Units  Every 8 hours         06/21/24 1803     IP VTE HIGH RISK PATIENT  Once         06/21/24 1803     Place sequential compression device  Until discontinued         06/21/24 1803                    Discharge Planning   ACE: 6/26/2024     Code Status: DNR   Is the patient medically ready for discharge?:     Reason for patient still in hospital (select all that apply): Treatment                      Oliva Harp MD  Department of Hospital Medicine   [facility] - Transplant Step[facility]

## 2024-06-22 NOTE — SUBJECTIVE & OBJECTIVE
Past Medical History:   Diagnosis Date    Arthritis     Gout     High cholesterol     Hypertension        Past Surgical History:   Procedure Laterality Date    ANKLE SURGERY      CHOLECYSTECTOMY      HYSTERECTOMY         Review of patient's allergies indicates:   Allergen Reactions    Zosyn [piperacillin-tazobactam] Hives    Gabapentin Hallucinations       Medications:  Medications Prior to Admission   Medication Sig    acetaminophen (TYLENOL) 325 MG tablet Take 650 mg by mouth every 6 (six) hours as needed for Pain.    albuterol-ipratropium (DUO-NEB) 2.5 mg-0.5 mg/3 mL nebulizer solution Take 3 mLs by nebulization every 6 (six) hours as needed for Wheezing. Rescue    allopurinoL (ZYLOPRIM) 100 MG tablet TAKE 2 TABLETS BY MOUTH EVERY DAY (Patient taking differently: Take 100 mg by mouth 2 (two) times daily.)    amLODIPine (NORVASC) 5 MG tablet Take 5 mg by mouth once daily.    ascorbic acid, vitamin C, (VITAMIN C) 500 MG tablet Take 500 mg by mouth 2 (two) times daily.    aspirin (ECOTRIN) 81 MG EC tablet Take 81 mg by mouth once daily.    benzonatate (TESSALON) 200 MG capsule Take 200 mg by mouth 3 (three) times daily as needed for Cough.    famotidine (PEPCID) 20 MG tablet Take 20 mg by mouth 2 (two) times daily as needed for Heartburn.    ferrous sulfate (FEOSOL) Tab tablet Take 1 tablet by mouth once daily.    lisinopriL-hydrochlorothiazide (PRINZIDE,ZESTORETIC) 20-12.5 mg per tablet TAKE 1 TABLET BY MOUTH EVERY DAY    multivitamin with folic acid (HIGH POTENCY MULTIVITAMIN) 400 mcg Tab Take 1 tablet by mouth once daily.    oxyCODONE (OXY-IR) 5 mg Cap Take 5 mg by mouth every 6 (six) hours as needed for Pain.    risedronate (ACTONEL) 35 MG tablet TAKE 1 TABLET BY MOUTH ONCE A WEEK (Patient taking differently: Take 35 mg by mouth every 7 days. On Friday.)    Saccharomyces boulardii (FLORASTOR) 250 mg capsule Take 250 mg by mouth once daily.    simvastatin (ZOCOR) 20 MG tablet TAKE 1 TABLET BY MOUTH EVERY EVENING      Antibiotics (From admission, onward)      None          Antifungals (From admission, onward)      None          Antivirals (From admission, onward)      None             Immunization History   Administered Date(s) Administered    COVID-19, MRNA, LN-S, PF (MODERNA FULL 0.5 ML DOSE) 03/05/2021    COVID-19, MRNA, LN-S, PF (Pfizer) (Purple Cap) 07/27/2021, 08/21/2021    Influenza (FLUAD) - Quadrivalent - Adjuvanted - PF *Preferred* (65+) 10/21/2020, 09/02/2022    Influenza - High Dose - PF (65 years and older) 11/12/2013, 11/01/2014, 09/13/2017, 10/18/2018    Influenza - Quadrivalent - High Dose - PF (65 years and older) 10/07/2021    Influenza - Quadrivalent - MDCK 11/05/2019    Influenza - Quadrivalent - PF *Preferred* (6 months and older) 08/31/2015, 08/29/2016    Pneumococcal Conjugate - 13 Valent 09/13/2017       Family History    None       Social History     Socioeconomic History    Marital status:    Tobacco Use    Smoking status: Never    Smokeless tobacco: Never   Substance and Sexual Activity    Alcohol use: No    Drug use: No     Social Determinants of Health     Food Insecurity: No Food Insecurity (5/18/2024)    Received from [facility]    Hunger Vital Sign     Worried About Running Out of Food in the Last Year: Never true     Ran Out of Food in the Last Year: Never true   Transportation Needs: No Transportation Needs (5/18/2024)    Received from [facility]    PRAPARE - Transportation     Lack of Transportation (Medical): No     Lack of Transportation (Non-Medical): No     Review of Systems   Constitutional:  Positive for activity change and appetite change. Negative for chills, diaphoresis and fever.   HENT:          Tongue swelling   Respiratory:  Negative for cough and shortness of breath.    Cardiovascular:  Negative for chest pain.   Gastrointestinal:  Negative for abdominal pain, diarrhea, nausea and vomiting.   Genitourinary:  Negative for difficulty urinating and dysuria.    Musculoskeletal:  Positive for arthralgias and joint swelling. Negative for back pain.   Skin:  Positive for color change, rash and wound.   Neurological:  Negative for headaches.   Psychiatric/Behavioral:  Negative for agitation and confusion. The patient is not nervous/anxious.      Objective:     Vital Signs (Most Recent):  Temp: 97.6 °F (36.4 °C) (06/22/24 1157)  Pulse: 73 (06/22/24 1157)  Resp: 18 (06/22/24 1157)  BP: (!) 130/58 (06/22/24 1157)  SpO2: 100 % (06/22/24 1157) Vital Signs (24h Range):  Temp:  [96.5 °F (35.8 °C)-98 °F (36.7 °C)] 97.6 °F (36.4 °C)  Pulse:  [69-89] 73  Resp:  [16-20] 18  SpO2:  [91 %-100 %] 100 %  BP: (104-149)/() 130/58     Weight: 84.4 kg (186 lb)  Body mass index is 34.02 kg/m².    Estimated Creatinine Clearance: 7.4 mL/min (A) (based on SCr of 5.4 mg/dL (H)).     Physical Exam  Vitals and nursing note reviewed.   Constitutional:       General: She is not in acute distress.     Appearance: Normal appearance. She is well-developed. She is obese. She is not ill-appearing or diaphoretic.   HENT:      Head: Normocephalic and atraumatic.      Right Ear: External ear normal.      Left Ear: External ear normal.      Nose: Nose normal.   Eyes:      General: No scleral icterus.        Right eye: No discharge.         Left eye: No discharge.      Extraocular Movements: Extraocular movements intact.      Conjunctiva/sclera: Conjunctivae normal.   Cardiovascular:      Rate and Rhythm: Normal rate.   Pulmonary:      Effort: Pulmonary effort is normal. No respiratory distress.      Breath sounds: No stridor.   Abdominal:      General: There is no distension.      Palpations: Abdomen is soft.      Tenderness: There is no abdominal tenderness.   Musculoskeletal:         General: Tenderness and signs of injury present. Normal range of motion.   Skin:     General: Skin is warm and dry.      Findings: Erythema and rash present.      Comments: Diffuse erythematous rash with skin  "peeling/desquamation present. See photos below  Necrotic knee wound   Neurological:      Mental Status: She is alert. Mental status is at baseline.      Cranial Nerves: No cranial nerve deficit.      Comments: Alert and answering some questions. Decreased concentration   Psychiatric:         Mood and Affect: Mood normal.                                                                                Significant Labs: Blood Culture:   Recent Labs   Lab 06/21/24  1443   LABBLOO No Growth to date  No Growth to date     CBC:   Recent Labs   Lab 06/21/24  1442 06/21/24  1449 06/22/24  0608 06/22/24  0940   WBC 20.56*  --  16.83*  --    HGB 12.6  --  11.6*  --    HCT 37.4 40 34.9* 32*     --  221  --      CMP:   Recent Labs   Lab 06/21/24  1442 06/22/24  0608    136   K 3.0* 3.3*    105   CO2 12* 9*   * 127*   BUN 87* 97*   CREATININE 5.0* 5.4*   CALCIUM 8.0* 7.8*   PROT 6.3 6.0   ALBUMIN 1.8* 1.8*   BILITOT 0.2 0.2   ALKPHOS 81 81   AST 14 14   ALT 5* 5*   ANIONGAP 21* 22*     Hepatitis Panel:   Recent Labs   Lab 06/21/24  1442   HEPCAB Non-reactive     HIV 1/2 Antibodies:   Recent Labs   Lab 06/21/24  1442   [id number] Non-reactive     HIV Rapid: No results for input(s): "HIVRAPID" in the last 48 hours.  Lactic Acid:   Recent Labs   Lab 06/21/24  2110 06/22/24  0608   LACTATE 1.3 1.8     Lipase: No results for input(s): "LIPASE" in the last 48 hours.  Microbiology Results (last 7 days)       Procedure Component Value Units Date/Time    Blood culture #2 **CANNOT BE ORDERED STAT** [[id number]] Collected: 06/21/24 1443    Order Status: Completed Specimen: Blood from Peripheral, Antecubital, Right Updated: 06/22/24 0115     Blood Culture, Routine No Growth to date    Blood culture #1 **CANNOT BE ORDERED STAT** [[id number]] Collected: 06/21/24 1443    Order Status: Completed Specimen: Blood from Peripheral, Antecubital, Right Updated: 06/22/24 0115     Blood Culture, Routine No Growth to date      "     Recent Lab Results  (Last 5 results in the past 24 hours)        06/22/24  1229   06/22/24  0940   06/22/24  0815   06/22/24  0608   06/22/24  0021        Albumin       1.8         ALP       81         Allens Test   N/A             ALT       5         Anion Gap       22         Aniso       Slight         Appearance, UA         Hazy       AST       14         Bacteria, UA         Rare       Bands       4.0         Baso #       CANCELED  Comment: Result canceled by the ancillary.         Basophil %       0.0         Bilirubin (UA)         Negative       BILIRUBIN TOTAL       0.2  Comment: For infants and newborns, interpretation of results should be based  on gestational age, weight and in agreement with clinical  observations.    Premature Infant recommended reference ranges:  Up to 24 hours.............<8.0 mg/dL  Up to 48 hours............<12.0 mg/dL  3-5 days..................<15.0 mg/dL  6-29 days.................<15.0 mg/dL           Site   Other             BUN       97         Calcium       7.8         Chloride       105         CO2       9  Comment: *Critical value notification by _adonis artis_ with confirmation of  receipt to _maritza contreras rn__ at  Date_62224___Time__815__           Color, UA         Yellow       Creatinine       5.4         DelSys   Room Air             Differential Method       Manual         eGFR       7.2         Eos #       CANCELED  Comment: Result canceled by the ancillary.         Eos %       2.0         Glucose       127         Glucose, UA         Negative       Gran %       83.0         Hematocrit       34.9         Hemoglobin       11.6         Hyaline Casts, UA         0       Immature Grans (Abs)       CANCELED  Comment: Mild elevation in immature granulocytes is non specific and   can be seen in a variety of conditions including stress response,   acute inflammation, trauma and pregnancy. Correlation with other   laboratory and clinical findings is essential.    Result  canceled by the ancillary.           Immature Granulocytes       CANCELED  Comment: Result canceled by the ancillary.         Ketones, UA         1+       Lactic Acid Level       1.8  Comment: Falsely low lactic acid results can be found in samples   containing >=13.0 mg/dL total bilirubin and/or >=3.5 mg/dL   direct bilirubin.           Leukocyte Esterase, UA         3+       Lymph #       CANCELED  Comment: Result canceled by the ancillary.         Lymph %       11.0         Magnesium        2.0         MCH       32.0         MCHC       33.2         MCV       96         Microscopic Comment         SEE COMMENT  Comment: Other formed elements not mentioned in the report are not   present in the microscopic examination.          Mode   SPONT             Mono #       CANCELED  Comment: Result canceled by the ancillary.         Mono %       0.0         MPV       11.3         NITRITE UA         Negative       nRBC       1         Blood, UA         Trace       pH, UA         5.0       Phosphorus Level       9.0  Comment: *Critical value notification by adonis artis__ with confirmation of  receipt to maritza contreras rn___ at Date___62224_Time_815___           Platelet Estimate       Appears normal         Platelet Count       221         POC BE   -15             POC HCO3   12.8             POC Hematocrit   32             POC PCO2   33.2             POC PH   7.195             POC PO2   27             POC SATURATED O2   37             POC TCO2   14             POCT Glucose 169     161           Poly       Occasional         Potassium       3.3         PROTEIN TOTAL       6.0         Protein, UA         1+  Comment: Recommend a 24 hour urine protein or a urine   protein/creatinine ratio if globulin induced proteinuria is  clinically suspected.         RBC       3.63         RBC, UA         16       RDW       22.1         Sample   VENOUS             Sodium       136         Spec Grav UA         1.025       Specimen UA          Urine, Clean Catch       Squam Epithel, UA         31       WBC, UA         4       WBC       16.83         Yeast, UA         Few                              Significant Imaging:     X-Ray Knee 3 View Right [[id number]] Resulted: 06/22/24 1319   Order Status: Completed Updated: 06/22/24 1322   Narrative:     EXAMINATION:  XR KNEE 3 VIEW RIGHT    CLINICAL HISTORY:  R knee wound s/p R rTKA;    TECHNIQUE:  AP, lateral, and Merchant views of the right knee were performed.    COMPARISON:  06/20/2019    FINDINGS:  There is been interval placement of right knee arthroplasty.  No recent exams are available for comparison.  Arthroplasty appears in appropriate positioning.  Please see op note.  No convincing dislocation or acute displaced fracture.  There is edema about the soft tissues.   Impression:       As above      Electronically signed by: Gilles Pavon MD  Date: 06/22/2024  Time: 13:19

## 2024-06-22 NOTE — CONSULTS
[facility] Step[facility]  Infectious Disease  Consult Note    Patient Name: Dahlia Spence  MRN: [id number]  Admission Date: 6/21/2024  Hospital Length of Stay: 0 days  Attending Physician: Dwight Granados MD  Primary Care Provider: Frandy Roe MD     Isolation Status: No active isolations    Patient information was obtained from patient and past medical records.      Inpatient consult to Infectious Diseases  Consult performed by: Vanessa Pickering PA-C  Consult ordered by: Oliva Harp MD        Assessment/Plan:     Derm  Hives    88-year-old female with history of right TKA  c/b distal femur periprosthetic fracture secondary to a fall s/p right knee revision of TKA with distal femoral replacement on 4/29 (completed empiric Linezolid > Daptomycin x 10 days through 5/11). Course complicated by right knee wound dehiscence s/p surgical I&D 5/16 followed by skin graft placement on 5/23. Cultures 5/16 grew Proteus and Pseudomonas. There were concerns for possible hardware involvement (Op note with dehiscence of inferior aspect of the capsular repair). Patient was discharged on six weeks of IV Zosyn from I&D (EOC 6/27). She followed up with ID on 6/6 and knee graft noted to be necrotic with slough present.  Wound cultures  grew CONS and alpha hemalytic strep. It is unclear if patient was started on a new antibiotic since this time.    Patient presents with diffuse rash and tongue swelling. In the ED: afebrile and VSS. WBC 20K, eosinophilia noted,K 3, Cr 5, , ESR 61. She was given IVF, cefepime, antihistamine, steroids in ED.  ID consulted for recommendations.    Recommendations  Discontinue and hold all antibiotics today as beta lactam antibiotics are known to potentially cause allergic reactions/drug eruptions   Please have case management get records of medication list from patient's facility to assess if she has been on any new medications/antibiotics other than Zosyn  Agree with Dermatology  consult  Orthopedic surgery consulted  Discussed plan with ID staff. ID will follow closely.         Thank you for your consult. I will follow-up with patient. Please contact us if you have any additional questions.    Vanessa Pickering PA-C  Infectious Disease  [facility]    Subjective:     Principal Problem: <principal problem not specified>    HPI: Dahlia Spence is an 88-year-old female with past medical history of hypertension, hyperlipidemia, osteoarthritis, DM, PVD who presented to [facility] 6/21 for a diffuse skin rash.     Patient has a history of right TKA 4/22/2024. She was working with physical therapy postoperatively and sustained a distal femur periprosthetic fracture of the right knee secondary to a fall. Due to proximity with knee arthroplasty, she underwent a right knee revision of TKA with distal femoral replacement on 4/29/2024. Due to increased risk of post op infection, ID was consulted and recommended 10 days of empiric antibiotics. She was initially on Linezolid (d/c due to thrombocytopenia) and completed course with Daptomycin (EOC 5/11). Patient was discharged to a nursing facility though represented 05/15/2024 with wound dehiscence. On 5/16 she underwent surgical I&D followed by skin graft placement on 5/23. Cultures 5/16 grew pan susceptible Proteus and Pseudomonas. There were concerns for possible hardware involvement (Op note with dehiscence of inferior aspect of the capsular repair). ID recommended six weeks of IV Zosyn from I&D (EOC 6/27).    Patient followed up with ID on 6/6. Her wound had some evidence of slough and necrosis and she underwent light debridement. Wound cultures  grew CONS and alpha hemalytic strep. Patient's daugher is at bedside and reports she was suppose to start a new oral antibiotic but doesn't believe the patient started in. Over the last week the patient has developed a full body rash and AMS. She was brought to the ED after complaining of  tongue swelling.     In the ED: afebrile and VSS. WBC 20K, eosinophilia noted,K 3, Cr 5, , ESR 61. She was given IVF, Cefepime, antihistamine, steroids.  ID consulted for recommendations.    Past Medical History:   Diagnosis Date    Arthritis     Gout     High cholesterol     Hypertension        Past Surgical History:   Procedure Laterality Date    ANKLE SURGERY      CHOLECYSTECTOMY      HYSTERECTOMY         Review of patient's allergies indicates:   Allergen Reactions    Zosyn [piperacillin-tazobactam] Hives    Gabapentin Hallucinations       Medications:  Medications Prior to Admission   Medication Sig    acetaminophen (TYLENOL) 325 MG tablet Take 650 mg by mouth every 6 (six) hours as needed for Pain.    albuterol-ipratropium (DUO-NEB) 2.5 mg-0.5 mg/3 mL nebulizer solution Take 3 mLs by nebulization every 6 (six) hours as needed for Wheezing. Rescue    allopurinoL (ZYLOPRIM) 100 MG tablet TAKE 2 TABLETS BY MOUTH EVERY DAY (Patient taking differently: Take 100 mg by mouth 2 (two) times daily.)    amLODIPine (NORVASC) 5 MG tablet Take 5 mg by mouth once daily.    ascorbic acid, vitamin C, (VITAMIN C) 500 MG tablet Take 500 mg by mouth 2 (two) times daily.    aspirin (ECOTRIN) 81 MG EC tablet Take 81 mg by mouth once daily.    benzonatate (TESSALON) 200 MG capsule Take 200 mg by mouth 3 (three) times daily as needed for Cough.    famotidine (PEPCID) 20 MG tablet Take 20 mg by mouth 2 (two) times daily as needed for Heartburn.    ferrous sulfate (FEOSOL) Tab tablet Take 1 tablet by mouth once daily.    lisinopriL-hydrochlorothiazide (PRINZIDE,ZESTORETIC) 20-12.5 mg per tablet TAKE 1 TABLET BY MOUTH EVERY DAY    multivitamin with folic acid (HIGH POTENCY MULTIVITAMIN) 400 mcg Tab Take 1 tablet by mouth once daily.    oxyCODONE (OXY-IR) 5 mg Cap Take 5 mg by mouth every 6 (six) hours as needed for Pain.    risedronate (ACTONEL) 35 MG tablet TAKE 1 TABLET BY MOUTH ONCE A WEEK (Patient taking differently: Take 35  mg by mouth every 7 days. On Friday.)    Saccharomyces boulardii (FLORASTOR) 250 mg capsule Take 250 mg by mouth once daily.    simvastatin (ZOCOR) 20 MG tablet TAKE 1 TABLET BY MOUTH EVERY EVENING     Antibiotics (From admission, onward)      None          Antifungals (From admission, onward)      None          Antivirals (From admission, onward)      None             Immunization History   Administered Date(s) Administered    COVID-19, MRNA, LN-S, PF (MODERNA FULL 0.5 ML DOSE) 03/05/2021    COVID-19, MRNA, LN-S, PF (Pfizer) (Purple Cap) 07/27/2021, 08/21/2021    Influenza (FLUAD) - Quadrivalent - Adjuvanted - PF *Preferred* (65+) 10/21/2020, 09/02/2022    Influenza - High Dose - PF (65 years and older) 11/12/2013, 11/01/2014, 09/13/2017, 10/18/2018    Influenza - Quadrivalent - High Dose - PF (65 years and older) 10/07/2021    Influenza - Quadrivalent - MDCK 11/05/2019    Influenza - Quadrivalent - PF *Preferred* (6 months and older) 08/31/2015, 08/29/2016    Pneumococcal Conjugate - 13 Valent 09/13/2017       Family History    None       Social History     Socioeconomic History    Marital status:    Tobacco Use    Smoking status: Never    Smokeless tobacco: Never   Substance and Sexual Activity    Alcohol use: No    Drug use: No     Social Determinants of Health     Food Insecurity: No Food Insecurity (5/18/2024)    Received from [facility]    Hunger Vital Sign     Worried About Running Out of Food in the Last Year: Never true     Ran Out of Food in the Last Year: Never true   Transportation Needs: No Transportation Needs (5/18/2024)    Received from [facility]    PRAPARE - Transportation     Lack of Transportation (Medical): No     Lack of Transportation (Non-Medical): No     Review of Systems   Constitutional:  Positive for activity change and appetite change. Negative for chills, diaphoresis and fever.   HENT:          Tongue swelling   Respiratory:  Negative for cough and shortness of breath.     Cardiovascular:  Negative for chest pain.   Gastrointestinal:  Negative for abdominal pain, diarrhea, nausea and vomiting.   Genitourinary:  Negative for difficulty urinating and dysuria.   Musculoskeletal:  Positive for arthralgias and joint swelling. Negative for back pain.   Skin:  Positive for color change, rash and wound.   Neurological:  Negative for headaches.   Psychiatric/Behavioral:  Negative for agitation and confusion. The patient is not nervous/anxious.      Objective:     Vital Signs (Most Recent):  Temp: 97.6 °F (36.4 °C) (06/22/24 1157)  Pulse: 73 (06/22/24 1157)  Resp: 18 (06/22/24 1157)  BP: (!) 130/58 (06/22/24 1157)  SpO2: 100 % (06/22/24 1157) Vital Signs (24h Range):  Temp:  [96.5 °F (35.8 °C)-98 °F (36.7 °C)] 97.6 °F (36.4 °C)  Pulse:  [69-89] 73  Resp:  [16-20] 18  SpO2:  [91 %-100 %] 100 %  BP: (104-149)/() 130/58     Weight: 84.4 kg (186 lb)  Body mass index is 34.02 kg/m².    Estimated Creatinine Clearance: 7.4 mL/min (A) (based on SCr of 5.4 mg/dL (H)).     Physical Exam  Vitals and nursing note reviewed.   Constitutional:       General: She is not in acute distress.     Appearance: Normal appearance. She is well-developed. She is obese. She is not ill-appearing or diaphoretic.   HENT:      Head: Normocephalic and atraumatic.      Right Ear: External ear normal.      Left Ear: External ear normal.      Nose: Nose normal.   Eyes:      General: No scleral icterus.        Right eye: No discharge.         Left eye: No discharge.      Extraocular Movements: Extraocular movements intact.      Conjunctiva/sclera: Conjunctivae normal.   Cardiovascular:      Rate and Rhythm: Normal rate.   Pulmonary:      Effort: Pulmonary effort is normal. No respiratory distress.      Breath sounds: No stridor.   Abdominal:      General: There is no distension.      Palpations: Abdomen is soft.      Tenderness: There is no abdominal tenderness.   Musculoskeletal:         General: Tenderness and signs of  "injury present. Normal range of motion.   Skin:     General: Skin is warm and dry.      Findings: Erythema and rash present.      Comments: Diffuse erythematous rash with skin peeling/desquamation present. See photos below  Necrotic knee wound   Neurological:      Mental Status: She is alert. Mental status is at baseline.      Cranial Nerves: No cranial nerve deficit.      Comments: Alert and answering some questions. Decreased concentration   Psychiatric:         Mood and Affect: Mood normal.                                                                                Significant Labs: Blood Culture:   Recent Labs   Lab 06/21/24  1443   LABBLOO No Growth to date  No Growth to date     CBC:   Recent Labs   Lab 06/21/24  1442 06/21/24  1449 06/22/24  0608 06/22/24  0940   WBC 20.56*  --  16.83*  --    HGB 12.6  --  11.6*  --    HCT 37.4 40 34.9* 32*     --  221  --      CMP:   Recent Labs   Lab 06/21/24  1442 06/22/24  0608    136   K 3.0* 3.3*    105   CO2 12* 9*   * 127*   BUN 87* 97*   CREATININE 5.0* 5.4*   CALCIUM 8.0* 7.8*   PROT 6.3 6.0   ALBUMIN 1.8* 1.8*   BILITOT 0.2 0.2   ALKPHOS 81 81   AST 14 14   ALT 5* 5*   ANIONGAP 21* 22*     Hepatitis Panel:   Recent Labs   Lab 06/21/24  1442   HEPCAB Non-reactive     HIV 1/2 Antibodies:   Recent Labs   Lab 06/21/24  1442   [id number] Non-reactive     HIV Rapid: No results for input(s): "HIVRAPID" in the last 48 hours.  Lactic Acid:   Recent Labs   Lab 06/21/24  2110 06/22/24  0608   LACTATE 1.3 1.8     Lipase: No results for input(s): "LIPASE" in the last 48 hours.  Microbiology Results (last 7 days)       Procedure Component Value Units Date/Time    Blood culture #2 **CANNOT BE ORDERED STAT** [[id number]] Collected: 06/21/24 1443    Order Status: Completed Specimen: Blood from Peripheral, Antecubital, Right Updated: 06/22/24 0115     Blood Culture, Routine No Growth to date    Blood culture #1 **CANNOT BE ORDERED STAT** [[id number]] " Collected: 06/21/24 1443    Order Status: Completed Specimen: Blood from Peripheral, Antecubital, Right Updated: 06/22/24 0115     Blood Culture, Routine No Growth to date          Recent Lab Results  (Last 5 results in the past 24 hours)        06/22/24  1229   06/22/24  0940   06/22/24  0815   06/22/24  0608   06/22/24  0021        Albumin       1.8         ALP       81         Allens Test   N/A             ALT       5         Anion Gap       22         Aniso       Slight         Appearance, UA         Hazy       AST       14         Bacteria, UA         Rare       Bands       4.0         Baso #       CANCELED  Comment: Result canceled by the ancillary.         Basophil %       0.0         Bilirubin (UA)         Negative       BILIRUBIN TOTAL       0.2  Comment: For infants and newborns, interpretation of results should be based  on gestational age, weight and in agreement with clinical  observations.    Premature Infant recommended reference ranges:  Up to 24 hours.............<8.0 mg/dL  Up to 48 hours............<12.0 mg/dL  3-5 days..................<15.0 mg/dL  6-29 days.................<15.0 mg/dL           Site   Other             BUN       97         Calcium       7.8         Chloride       105         CO2       9  Comment: *Critical value notification by _adonis artis_ with confirmation of  receipt to _maritza contreras rn__ at  Date_62224___Time__815__           Color, UA         Yellow       Creatinine       5.4         DelSys   Room Air             Differential Method       Manual         eGFR       7.2         Eos #       CANCELED  Comment: Result canceled by the ancillary.         Eos %       2.0         Glucose       127         Glucose, UA         Negative       Gran %       83.0         Hematocrit       34.9         Hemoglobin       11.6         Hyaline Casts, UA         0       Immature Grans (Abs)       CANCELED  Comment: Mild elevation in immature granulocytes is non specific and   can be seen in a  variety of conditions including stress response,   acute inflammation, trauma and pregnancy. Correlation with other   laboratory and clinical findings is essential.    Result canceled by the ancillary.           Immature Granulocytes       CANCELED  Comment: Result canceled by the ancillary.         Ketones, UA         1+       Lactic Acid Level       1.8  Comment: Falsely low lactic acid results can be found in samples   containing >=13.0 mg/dL total bilirubin and/or >=3.5 mg/dL   direct bilirubin.           Leukocyte Esterase, UA         3+       Lymph #       CANCELED  Comment: Result canceled by the ancillary.         Lymph %       11.0         Magnesium        2.0         MCH       32.0         MCHC       33.2         MCV       96         Microscopic Comment         SEE COMMENT  Comment: Other formed elements not mentioned in the report are not   present in the microscopic examination.          Mode   SPONT             Mono #       CANCELED  Comment: Result canceled by the ancillary.         Mono %       0.0         MPV       11.3         NITRITE UA         Negative       nRBC       1         Blood, UA         Trace       pH, UA         5.0       Phosphorus Level       9.0  Comment: *Critical value notification by adonis artis__ with confirmation of  receipt to maritza contreras rn___ at Date___62224_Time_815___           Platelet Estimate       Appears normal         Platelet Count       221         POC BE   -15             POC HCO3   12.8             POC Hematocrit   32             POC PCO2   33.2             POC PH   7.195             POC PO2   27             POC SATURATED O2   37             POC TCO2   14             POCT Glucose 169     161           Poly       Occasional         Potassium       3.3         PROTEIN TOTAL       6.0         Protein, UA         1+  Comment: Recommend a 24 hour urine protein or a urine   protein/creatinine ratio if globulin induced proteinuria is  clinically suspected.         RBC        3.63         RBC, UA         16       RDW       22.1         Sample   VENOUS             Sodium       136         Spec Grav UA         1.025       Specimen UA         Urine, Clean Catch       Squam Epithel, UA         31       WBC, UA         4       WBC       16.83         Yeast, UA         Few                              Significant Imaging:     X-Ray Knee 3 View Right [[id number]] Resulted: 06/22/24 1319   Order Status: Completed Updated: 06/22/24 1322   Narrative:     EXAMINATION:  XR KNEE 3 VIEW RIGHT    CLINICAL HISTORY:  R knee wound s/p R rTKA;    TECHNIQUE:  AP, lateral, and Merchant views of the right knee were performed.    COMPARISON:  06/20/2019    FINDINGS:  There is been interval placement of right knee arthroplasty.  No recent exams are available for comparison.  Arthroplasty appears in appropriate positioning.  Please see op note.  No convincing dislocation or acute displaced fracture.  There is edema about the soft tissues.   Impression:       As above      Electronically signed by: Gilles Pavon MD  Date: 06/22/2024  Time: 13:19

## 2024-06-22 NOTE — SUBJECTIVE & OBJECTIVE
Past Medical History:   Diagnosis Date    Arthritis     Gout     High cholesterol     Hypertension        Past Surgical History:   Procedure Laterality Date    ANKLE SURGERY      CHOLECYSTECTOMY      HYSTERECTOMY         Review of patient's allergies indicates:   Allergen Reactions    Gabapentin Hallucinations       No current facility-administered medications on file prior to encounter.     Current Outpatient Medications on File Prior to Encounter   Medication Sig    allopurinoL (ZYLOPRIM) 100 MG tablet TAKE 2 TABLETS BY MOUTH EVERY DAY    celecoxib (CELEBREX) 200 MG capsule TAKE 1 CAPSULE BY MOUTH TWICE DAILY    cetirizine (ZYRTEC) 10 MG tablet Take 1 tablet (10 mg total) by mouth once daily.    hydrocodone-acetaminophen 5-325mg (NORCO) 5-325 mg per tablet     lisinopriL-hydrochlorothiazide (PRINZIDE,ZESTORETIC) 20-12.5 mg per tablet TAKE 1 TABLET BY MOUTH EVERY DAY    nystatin (MYCOSTATIN) cream Apply topically 2 (two) times daily.    risedronate (ACTONEL) 35 MG tablet TAKE 1 TABLET BY MOUTH ONCE A WEEK    simvastatin (ZOCOR) 20 MG tablet TAKE 1 TABLET BY MOUTH EVERY EVENING     Family History    None       Tobacco Use    Smoking status: Never    Smokeless tobacco: Never   Substance and Sexual Activity    Alcohol use: No    Drug use: No    Sexual activity: Not on file     Review of Systems  Objective:     Vital Signs (Most Recent):  Temp: 98 °F (36.7 °C) (06/21/24 1530)  Pulse: 78 (06/21/24 1900)  Resp: 16 (06/21/24 1800)  BP: (!) 136/58 (06/21/24 1600)  SpO2: 99 % (06/21/24 1800) Vital Signs (24h Range):  Temp:  [97.9 °F (36.6 °C)-98 °F (36.7 °C)] 98 °F (36.7 °C)  Pulse:  [75-81] 78  Resp:  [16-20] 16  SpO2:  [99 %] 99 %  BP: (120-145)/(58-88) 136/58     Weight: 83.9 kg (185 lb)  Body mass index is 33.84 kg/m².     Physical Exam  HENT:      Head: Normocephalic.   Eyes:      Pupils: Pupils are equal, round, and reactive to light.   Musculoskeletal:      Cervical back: No rigidity.   Skin:     General: Skin is  warm.      Capillary Refill: Capillary refill takes less than 2 seconds.      Findings: Erythema and rash (hives) present.      Comments: Full-body hives and erythema  With minimal desquamation  Right knee bandages post-op   Neurological:      Mental Status: She is alert. She is disoriented.              CRANIAL NERVES     CN III, IV, VI   Pupils are equal, round, and reactive to light.       Significant Labs: All pertinent labs within the past 24 hours have been reviewed.    Significant Imaging: I have reviewed all pertinent imaging results/findings within the past 24 hours.

## 2024-06-22 NOTE — ASSESSMENT & PLAN NOTE
Right knee wound (underwent knee replacement) is erythematous and draining pus. She has along history of wound infection that has kept her on chronic antibiotics in the past 2 months    Plan:  Ortho consultation

## 2024-06-22 NOTE — NURSING
Pt daughter left room to visit nursing home pt was recently admitted to. Daughter called to report pt received zosyn at the nursing home which may be the cause of the hives.

## 2024-06-22 NOTE — CONSULTS
KAYLEIGH consulted for PIV insertion in real time using ultrasound guidance.    Indication: PVA  Gauge and length: 20 g x 1 3/4 inch  Location: RFA

## 2024-06-22 NOTE — CONSULTS
"[facility]  Dermatology  Consult Note    Patient Name: Dahlia Spence  MRN: [id number]  Admission Date: 6/21/2024  Hospital Length of Stay: 0 days  Attending Physician: Dwight Granados MD  Primary Care Provider: Frandy Roe MD     Inpatient consult to Dermatology  Consult performed by: Alecia Watkins MD  Consult ordered by: Kimmie Hoyt DO  Reason for consult: rash        Subjective:       HPI:  88 YOF w/CKD3 (baseline Cr 1.5), T2DM, COPD, PVD 2/2 to DM, HTN, HLD, OA who presented for a skin rash.     The daughter is present and helps with history.     Patient currently living at nursing facility. On visit to her mother 3 days ago, daughter first noted to patient have a few "hives".  Daughter saw the patient again yesterday and noted progression rash - more body surface involvement, more red, and onset of peeling skin. Overall rash is described as itchy rather than painful, although notes some burning at the groin. No ocular, nasal, oropharyngeal symptoms. +AMS. Patient has been on Zosyn recently and nursing home concerned for drug rash.      Significant recent hx:   right TKA  c/b distal femur periprosthetic fracture secondary to a fall s/p right knee revision of TKA with distal femoral replacement on 4/29 (completed empiric Linezolid > Daptomycin x 10 days through 5/11). Course complicated by right knee wound dehiscence s/p surgical I&D 5/16 followed by skin graft placement on 5/23. Cultures 5/16 grew Proteus and Pseudomonas. There were concerns for possible hardware involvement (Op note with dehiscence of inferior aspect of the capsular repair). Patient was discharged on six weeks of IV Zosyn from I&D (EOC 6/27). She followed up with ID on 6/6 and knee graft noted to be necrotic with slough present.  Wound cultures  grew CONS and alpha hemalytic strep. It is unclear if patient was started on a new antibiotic since this time.      In the ED: Patient afebrile, VSS. Labs showing as below. " Received fluid resuscitation, IV cefepime, antihistamine, steroids.  Afebrile, VS stable    WBC 20.56 (elevated granulocytes and eosinophils)  HGB 12.6     K 3.0  Cr 5.0 (baseline 1.5) ESR 61 Glucose 119, BUN 87, CO2 12m  Lactate 1.3     Home medications:   -Simvastatin  -Florastor 250mg  -Risedronate  -Oxy  -multivitamine  -Lisinopril-HCTZ  -Pepcid  -Ferrous sulfate  -Benzonate  -ASA 81  -Amlodipine  -Allopurinol (longterm med for gout)     Past Medical History:   Diagnosis Date    Arthritis     Gout     High cholesterol     Hypertension        Past Surgical History:   Procedure Laterality Date    ANKLE SURGERY      CHOLECYSTECTOMY      HYSTERECTOMY       Family History    None       Tobacco Use    Smoking status: Never    Smokeless tobacco: Never   Substance and Sexual Activity    Alcohol use: No    Drug use: No    Sexual activity: Not on file       Review of patient's allergies indicates:   Allergen Reactions    Zosyn [piperacillin-tazobactam] Hives    Gabapentin Hallucinations       Medications:  Continuous Infusions:  Scheduled Meds:   atorvastatin  10 mg Oral Daily    cetirizine  5 mg Oral Daily    heparin (porcine)  5,000 Units Subcutaneous Q8H    QUEtiapine  25 mg Oral QHS    triamcinolone acetonide 0.025%   Topical (Top) Daily     PRN Meds:  Current Facility-Administered Medications:     dextrose 10%, 12.5 g, Intravenous, PRN    dextrose 10%, 25 g, Intravenous, PRN    glucagon (human recombinant), 1 mg, Intramuscular, PRN    glucose, 16 g, Oral, PRN    glucose, 24 g, Oral, PRN    hydrOXYzine HCL, 25 mg, Oral, TID PRN    insulin aspart U-100, 0-5 Units, Subcutaneous, QID (AC + HS) PRN    naloxone, 0.02 mg, Intravenous, PRN    sodium chloride 0.9%, 10 mL, Intravenous, Q12H PRN    Review of Systems  Objective:     Vital Signs (Most Recent):  Temp: 97.6 °F (36.4 °C) (06/22/24 1157)  Pulse: 73 (06/22/24 1157)  Resp: 18 (06/22/24 1157)  BP: (!) 130/58 (06/22/24 1157)  SpO2: 100 % (06/22/24  1157) Vital Signs (24h Range):  Temp:  [96.5 °F (35.8 °C)-98 °F (36.7 °C)] 97.6 °F (36.4 °C)  Pulse:  [69-89] 73  Resp:  [16-20] 18  SpO2:  [91 %-100 %] 100 %  BP: (104-149)/() 130/58     Weight: 84.4 kg (186 lb)  Body mass index is 34.02 kg/m².     Physical Exam   Constitutional: She is obese.    Neurological: She is alert.   Skin:   Areas Examined (abnormalities noted in diagram):   Head / Face Inspection Performed  Neck Inspection Performed  Chest / Axilla Inspection Performed  Abdomen Inspection Performed  Genitals / Buttocks / Groin Inspection Performed  Back Inspection Performed  RUE Inspected  LUE Inspection Performed  RLE Inspected  LLE Inspection Performed  Nails and Digits Inspection Performed  Gland Inspection Performed  -Confluent erythematous plaques and satellite and scattered papules / small plaques; associated exfoliation at face, torso, flexures incl groin. Possible facial edema per daughter compared to baseline. No target / targetoid lesions.   -Raw eroded at vulva, upper thigh, intergluteal fold.    - Otherwise, no erosions, ulcerations, crusting at examined mucosa of eyes, nose, oropharynx.                                                                                    Significant Labs: CBC:      Latest Reference Range & Units 06/21/24 14:42   WBC 3.90 - 12.70 K/uL 20.56 (H)   RBC 4.00 - 5.40 M/uL 3.94 (L)   Hemoglobin 12.0 - 16.0 g/dL 12.6   Hematocrit 37.0 - 48.5 % 37.4   MCV 82 - 98 fL 95   MCH 27.0 - 31.0 pg 32.0 (H)   MCHC 32.0 - 36.0 g/dL 33.7   RDW 11.5 - 14.5 % 22.3 (H)   Platelet Count 150 - 450 K/uL 225   MPV 9.2 - 12.9 fL 10.9   Gran % 38.0 - 73.0 % 71.5   Lymph % 18.0 - 48.0 % 14.8 (L)   Mono % 4.0 - 15.0 % 1.7 (L)   Eos % 0.0 - 8.0 % 6.8   Basophil % 0.0 - 1.9 % 0.6   Immature Granulocytes 0.0 - 0.5 % 4.6 (H)   Gran # (ANC) 1.8 - 7.7 K/uL 14.7 (H)   Lymph # 1.0 - 4.8 K/uL 3.1   Mono # 0.3 - 1.0 K/uL 0.4   Eos # 0.0 - 0.5 K/uL 1.4 (H)   Baso # 0.00 - 0.20 K/uL 0.13   Immature  Grans (Abs) 0.00 - 0.04 K/uL 0.95 (H)   nRBC 0 /100 WBC 1 !   Differential Method  Automated     CMP:   Recent Labs   Lab 06/21/24  1442 06/22/24  0608    136   K 3.0* 3.3*    105   CO2 12* 9*   * 127*   BUN 87* 97*   CREATININE 5.0* 5.4*   CALCIUM 8.0* 7.8*   PROT 6.3 6.0   ALBUMIN 1.8* 1.8*   BILITOT 0.2 0.2   ALKPHOS 81 81   AST 14 14   ALT 5* 5*   ANIONGAP 21* 22*     Recent Labs   Lab 05/03/24  0420   TSH 1.65      Latest Reference Range & Units 06/22/24 16:05   CPK 20 - 180 U/L 45       Assessment/Plan:   #Rash, favor drug eruption with concern for severe cutaneous adverse reaction, notably DRESS  88yoF w/ hx of CKD3, T2DM, COPD, PVD 2/2 to DM, HTN, HLD, OA who presented for acute widespread exfoliative dermatitis. The patient has been exposed to numerous antibiotics in the past several weeks including linezolid, daptomycin and, most recently, Zosyn ongoing for the past 3 weeks for infectious complications of orthopaedic surgery to R leg.  Currently favor drug reaction with concern for severe cutaneous adverse reaction, notably DRESS (consistent rash appearance, pruritic eruption NOT painful, facial edema, eosinophilia, neutrophilia, possible renal involvement; however, no significant fever, LAD, other systemic involvement)  - possible to probably by RegiSCAR scoring system. Pathology for DRESS non-specific but will execute biopsy to rule out other SCAR incl early SJS/TEN.     Recommendations:   - Consider: peripheral smear for atypical lymphocytes, PCR for HHV6, HHV7, EBV, and CMV.  - Nephrology consult to further evaluate renal dysfunction, comment on possibility of systemic hypersensitivity. ID consult to further evaluate wound infection especially if need for systemics arise, see below.   - Stop all unnecessary medications. Avoid possible culprit meds (exposure typically 2-6 wk prior eruption in DRESS, 7-21 days in exanthematous drug eruption and SJS/TEN) -  Zosyn, Linezolid >  daptomycin. Low suspicion for allopurinol given long term med previously well-tolerated.    - Supportive care.   - Triamcinolone BID PRN rash; consider wet wraps for torso, non-infected extremities to jumpstart improvement. See below for protocol.   - Consider systemic corticosteroids (typically 1-2 mg/kg/day of prednisone or equivalent) pending course, weighing risk / benefit in setting of wound infection, T2DM. If initiated, DRESS classically necessitates gradual taper to prevent rebound.      Punch biopsy procedure note:  Punch biopsy performed after verbal consent obtained. Area marked and prepped with alcohol. Approximately 1cc of 1% lidocaine with epinephrine injected. 5 mm disposable punch used to remove lesion. Hemostasis obtained and biopsy site closed with surgifoam. Wound care instructions reviewed. Pt tolerating procedure well.     Wet wrap therapy:  - Apply thin layer of steroid cream to all affected areas on body.   - Cover with warm, damp cotton Pjs (or wrap with warm, damp towels) first then second layer of dry cotton Pjs (or wrap with large dry towel or blanket).  - Remove after 30 minutes to an hour.   - Re-apply thin layer of steroid cream to all affected areas on body.       Thank you for your consult. I will follow-up with patient. Please contact us if you have any additional questions.    Alecia Watkins MD  Dermatology  [facility]    I have reviewed and concur with the resident's history, physical, assessment, and plan as written with my edits.  I have been available to the resident and involved in the medical decision making at all times during the care of this patient.     Yanci Donis MD  [facility]

## 2024-06-22 NOTE — ASSESSMENT & PLAN NOTE
Dahlia Spence is a 88 y.o. female with a PMHx of CKD3, T2DM, COPD, PVD, HTN, HLD who is currently admitted for drug-induced systemic response and hives.  Orthopedic surgery was consulted to evaluate the open wound of her right knee.  The patient had an initial R TKA on 04/22/2024 which was complicated by periprosthetic fracture requiring conversion to right distal femoral replacement.  This was then complicated by wound dehiscence and infection requiring multiple irrigation and debridements and gastrocnemius flap by Plastic surgery at an outside facility.  In the interim, the patient was been receiving local wound care per Plastic surgery.  Prior to admission the patient was being treated with a 6 week course of IV Zosyn.  At this time, the patient has been afebrile and hemodynamically stable over the past 24 hours.  She has a leukocytosis to 16.83.  , ESR 61.  She has a metabolic acidosis with a pH of 7.2.The patient likely has a PJI as well as infected gastroc flap and is malnourished with an albumin of 1.8. The primary objective should be optimizing her nutrition, strict control of her blood sugars, and local wound care to give her the best chance of healing her wound.     - Optimize nutrition, recommend Boost with all meals  - Continue IV Zosyn   - Continue local wound care; recommend wound care consult for assistance while inpatient   - Continue plan of care per primary surgeon; follow-up on 6/26/24 per family's report

## 2024-06-22 NOTE — PLAN OF CARE
Pt is confused and not oriented. Pt has moments of speaking Serbian to english. 2 person assist. Afebrile. RA. Tele, NS to ST. Accu check, Ac&hs. , this shift. No SSI, needed. Pt is incont to urine and bowel. Purwick in place.1 BM this shift. Rt femur fx, brace in place. Skin breakdown to buttocks, upper posterior thigh, and vaginal region. Hives all over from previous allergic reaction at nursing home. Pt very agitated around midnight. PRN Haldol given. Bed locked in the lowest position, bed alarm on, and call light within reach.

## 2024-06-22 NOTE — ASSESSMENT & PLAN NOTE
Patient's CRT today is 5.0 >>5.4  Baseline in the system is 1.5  Acidosis with ph 7.1  HCO3 is 9  Phos 9    Plan:  - Nephrology consult, now  - urine studies pending

## 2024-06-22 NOTE — ASSESSMENT & PLAN NOTE
Chronic, controlled. Latest blood pressure and vitals reviewed-     Temp:  [96.5 °F (35.8 °C)-98 °F (36.7 °C)]   Pulse:  [69-89]   Resp:  [16-20]   BP: (104-149)/()   SpO2:  [91 %-100 %] .   Home meds for hypertension were reviewed and noted below.   Hypertension Medications               lisinopriL-hydrochlorothiazide (PRINZIDE,ZESTORETIC) 20-12.5 mg per tablet TAKE 1 TABLET BY MOUTH EVERY DAY            While in the hospital, will manage blood pressure as follows; Adjust home antihypertensive regimen as follows- HOLD    Will utilize p.r.n. blood pressure medication only if patient's blood pressure greater than 180/110 and she develops symptoms such as worsening chest pain or shortness of breath.

## 2024-06-22 NOTE — H&P
[facility] [place]  History & Physical    Patient Name: Dahlia Spence  MRN: [id number]  Patient Class: OP- Observation  Admission Date: 6/21/2024  Attending Physician: Dwight Granados MD   Primary Care Provider: Frandy Roe MD         Patient information was obtained from patient and ER records.     Subjective:     Principal Problem:<principal problem not specified>    Chief Complaint:   Chief Complaint   Patient presents with    Allergic Reaction     Allergic reaction to Abx for celluitis, full body hives and itching, tongue swelling, no SOB, airway protected.         HPI: 88 YOF w/CKD3, T2DM, COPD, PVD 2/2 to DM, HTN, HLD, LBP, OA who presented for a skin rash. The patient had a recent I&D (05/16/2024) for her R knee wound and started zosyn. Then 7 days ago she developed full body hives with rash and tongue swelling. Family reports AMS and is not at her baseline.     Recent history: right distal femur replacement on 04/29/2024, then I&D of RLE 5/16/24, then dehiscence of the right TKA and underwent a right gastroc flap, split thickness skin graft, and wound VAC on 05/23/2024. Then the patient had a post op infection and had a 10 day course of linezolid to daptomycin. Then she had a second post op infection treated with a 6 week course of zosyn on  05/30/2024 and was seen by ID.     In the ED: patient received fluid resuscitation and IV ceftriaxone. She exhibited signs of minimal skin desqaumation amidst normal vital signs.  WBC 20.56  HGB 12.6     K 3.0  CRT 5.0 (baseline 1.5)    Past Medical History:   Diagnosis Date    Arthritis     Gout     High cholesterol     Hypertension        Past Surgical History:   Procedure Laterality Date    ANKLE SURGERY      CHOLECYSTECTOMY      HYSTERECTOMY         Review of patient's allergies indicates:   Allergen Reactions    Gabapentin Hallucinations       No current facility-administered medications on file prior to  encounter.     Current Outpatient Medications on File Prior to Encounter   Medication Sig    allopurinoL (ZYLOPRIM) 100 MG tablet TAKE 2 TABLETS BY MOUTH EVERY DAY    celecoxib (CELEBREX) 200 MG capsule TAKE 1 CAPSULE BY MOUTH TWICE DAILY    cetirizine (ZYRTEC) 10 MG tablet Take 1 tablet (10 mg total) by mouth once daily.    hydrocodone-acetaminophen 5-325mg (NORCO) 5-325 mg per tablet     lisinopriL-hydrochlorothiazide (PRINZIDE,ZESTORETIC) 20-12.5 mg per tablet TAKE 1 TABLET BY MOUTH EVERY DAY    nystatin (MYCOSTATIN) cream Apply topically 2 (two) times daily.    risedronate (ACTONEL) 35 MG tablet TAKE 1 TABLET BY MOUTH ONCE A WEEK    simvastatin (ZOCOR) 20 MG tablet TAKE 1 TABLET BY MOUTH EVERY EVENING     Family History    None       Tobacco Use    Smoking status: Never    Smokeless tobacco: Never   Substance and Sexual Activity    Alcohol use: No    Drug use: No    Sexual activity: Not on file     Review of Systems  Objective:     Vital Signs (Most Recent):  Temp: 98 °F (36.7 °C) (06/21/24 1530)  Pulse: 78 (06/21/24 1900)  Resp: 16 (06/21/24 1800)  BP: (!) 136/58 (06/21/24 1600)  SpO2: 99 % (06/21/24 1800) Vital Signs (24h Range):  Temp:  [97.9 °F (36.6 °C)-98 °F (36.7 °C)] 98 °F (36.7 °C)  Pulse:  [75-81] 78  Resp:  [16-20] 16  SpO2:  [99 %] 99 %  BP: (120-145)/(58-88) 136/58     Weight: 83.9 kg (185 lb)  Body mass index is 33.84 kg/m².     Physical Exam  HENT:      Head: Normocephalic.   Eyes:      Pupils: Pupils are equal, round, and reactive to light.   Musculoskeletal:      Cervical back: No rigidity.   Skin:     General: Skin is warm.      Capillary Refill: Capillary refill takes less than 2 seconds.      Findings: Erythema and rash (hives) present.      Comments: Full-body hives and erythema  With minimal desquamation  Right knee bandages post-op   Neurological:      Mental Status: She is alert. She is disoriented.              CRANIAL NERVES     CN III, IV, VI   Pupils are equal, round, and reactive to  light.       Significant Labs: All pertinent labs within the past 24 hours have been reviewed.    Significant Imaging: I have reviewed all pertinent imaging results/findings within the past 24 hours.  Assessment/Plan:     Hives  Possibly drug induced  Will continue to monitor  May start abx and may not      Type 2 diabetes mellitus with other circulatory complications  Low dose ssi as needed prn  HBA1C      Chronic obstructive pulmonary disease, unspecified  Supplemental oxygen  Duo-nebs  Target SPO2 >88%    Chronic kidney disease, stage III (moderate)  Patient's CRT today is 5.0  Baseline in the system is 1.5    Plan:  Nephrology consult    Benign essential hypertension  Chronic, controlled. Latest blood pressure and vitals reviewed-     Temp:  [97.9 °F (36.6 °C)-98 °F (36.7 °C)]   Pulse:  [75-81]   Resp:  [16-20]   BP: (120-145)/(58-88)   SpO2:  [99 %] .   Home meds for hypertension were reviewed and noted below.   Hypertension Medications               lisinopriL-hydrochlorothiazide (PRINZIDE,ZESTORETIC) 20-12.5 mg per tablet TAKE 1 TABLET BY MOUTH EVERY DAY            While in the hospital, will manage blood pressure as follows; Adjust home antihypertensive regimen as follows- HOLD    Will utilize p.r.n. blood pressure medication only if patient's blood pressure greater than 180/110 and she develops symptoms such as worsening chest pain or shortness of breath.      VTE Risk Mitigation (From admission, onward)           Ordered     heparin (porcine) injection 5,000 Units  Every 8 hours         06/21/24 1803     IP VTE HIGH RISK PATIENT  Once         06/21/24 1803     Place sequential compression device  Until discontinued         06/21/24 1803                           On 06/21/2024, patient should be placed in hospital observation services under my care in collaboration with Dr Sales.         Oliva Harp MD  Department of Hospital Medicine  [facility]

## 2024-06-22 NOTE — SUBJECTIVE & OBJECTIVE
Interval History: patient is still altered. Knee pain ongoing. Rash is still evident and widespread.    Review of Systems  Objective:     Vital Signs (Most Recent):  Temp: 97.6 °F (36.4 °C) (06/22/24 1157)  Pulse: 73 (06/22/24 1157)  Resp: 18 (06/22/24 1157)  BP: (!) 130/58 (06/22/24 1157)  SpO2: 100 % (06/22/24 1157) Vital Signs (24h Range):  Temp:  [96.5 °F (35.8 °C)-98 °F (36.7 °C)] 97.6 °F (36.4 °C)  Pulse:  [69-89] 73  Resp:  [16-20] 18  SpO2:  [91 %-100 %] 100 %  BP: (104-149)/() 130/58     Weight: 84.4 kg (186 lb)  Body mass index is 34.02 kg/m².    Intake/Output Summary (Last 24 hours) at 6/22/2024 1336  Last data filed at 6/22/2024 1210  Gross per 24 hour   Intake 2059 ml   Output 100 ml   Net 1959 ml         Physical Exam  HENT:      Head: Normocephalic and atraumatic.   Eyes:      Pupils: Pupils are equal, round, and reactive to light.   Cardiovascular:      Rate and Rhythm: Normal rate and regular rhythm.      Pulses: Normal pulses.      Heart sounds: Normal heart sounds.   Pulmonary:      Breath sounds: Normal breath sounds.   Abdominal:      General: Bowel sounds are normal.      Palpations: Abdomen is soft.   Musculoskeletal:      Right lower leg: No edema.      Left lower leg: No edema.   Skin:     General: Skin is warm.      Capillary Refill: Capillary refill takes less than 2 seconds.      Findings: Erythema and rash present.      Comments: Unchanged from yesterday   Neurological:      Mental Status: She is alert. She is disoriented.             Significant Labs: All pertinent labs within the past 24 hours have been reviewed.    Significant Imaging: I have reviewed all pertinent imaging results/findings within the past 24 hours.

## 2024-06-22 NOTE — NURSING
Nurses Note -- 4 Eyes      6/22/2024   2:06 AM      Skin assessed during: Admit      [] No Altered Skin Integrity Present    []Prevention Measures Documented      [x] Yes- Altered Skin Integrity Present or Discovered   [x] LDA Added if Not in Epic (Describe Wound)   [] New Altered Skin Integrity was Present on Admit and Documented in LDA   [x] Wound Image Taken    Wound Care Consulted? Yes    Attending Nurse:  Vanessa Gomes RN/Staff Member:   Darshan PILLAI RN     Pt skin on  vagina, buttocks and upper posterior thigh is pink to reddened. Skin is intact and blanchable.

## 2024-06-22 NOTE — SUBJECTIVE & OBJECTIVE
Past Medical History:   Diagnosis Date    Arthritis     Gout     High cholesterol     Hypertension        Past Surgical History:   Procedure Laterality Date    ANKLE SURGERY      CHOLECYSTECTOMY      HYSTERECTOMY         Review of patient's allergies indicates:   Allergen Reactions    Zosyn [piperacillin-tazobactam] Hives    Gabapentin Hallucinations       Current Facility-Administered Medications   Medication    atorvastatin tablet 10 mg    cetirizine tablet 5 mg    dextrose 10% bolus 125 mL 125 mL    dextrose 10% bolus 250 mL 250 mL    glucagon (human recombinant) injection 1 mg    glucose chewable tablet 16 g    glucose chewable tablet 24 g    heparin (porcine) injection 5,000 Units    hydrOXYzine HCL tablet 25 mg    insulin aspart U-100 pen 0-5 Units    naloxone 0.4 mg/mL injection 0.02 mg    QUEtiapine tablet 25 mg    sodium chloride 0.9% flush 10 mL    triamcinolone acetonide 0.025% cream     Family History    None       Tobacco Use    Smoking status: Never    Smokeless tobacco: Never   Substance and Sexual Activity    Alcohol use: No    Drug use: No    Sexual activity: Not on file     ROS  Constitutional: negative for fevers or chills  Eyes: negative visual changes or eye discharge  ENT: negative for ear pain or sore throat  Respiratory: negative for shortness of breath or cough  Cardiovascular: negative for chest pain or palpitations  Gastrointestinal: negative for abdominal pain, nausea, or vomiting  Genitourinary: negative for dysuria and flank pain  Neurological: negative for headaches or dizziness  Musculoskeletal: positive for right knee wound and pain   Skin: positive for rash      Objective:     Vital Signs (Most Recent):  Temp: 96.5 °F (35.8 °C) (06/22/24 0725)  Pulse: 71 (06/22/24 1054)  Resp: 20 (06/22/24 0725)  BP: (!) 104/52 (06/22/24 0725)  SpO2: 99 % (06/22/24 0800) Vital Signs (24h Range):  Temp:  [96.5 °F (35.8 °C)-98 °F (36.7 °C)] 96.5 °F (35.8 °C)  Pulse:  [69-89] 71  Resp:  [16-20]  "20  SpO2:  [91 %-100 %] 99 %  BP: (104-149)/() 104/52     Weight: 84.4 kg (186 lb)  Height: 5' 2" (157.5 cm)  Body mass index is 34.02 kg/m².      Intake/Output Summary (Last 24 hours) at 6/22/2024 1147  Last data filed at 6/22/2024 1030  Gross per 24 hour   Intake 1059 ml   Output 100 ml   Net 959 ml        Ortho/SPM Exam  General:  no acute distress, appears stated age   Neuro: alert and oriented x3  Psych: normal mood  Head: normocephalic, atraumatic.  Eyes: no scleral icterus  Mouth: moist mucous membranes  CV: extremities warm and well perfused  Pulm: breathing comfortably, equal chest rise bilat  Skin: widespread purpuric rash with areas of desquamation. Open wound at the right knee    MSK:    RUE:  - Purpuric rash with areas of desquamation at the medial arm   - No swelling  - NonTTP throughout  - Makes a composite fist when prompted   - Unable to assess ROM, strength, and sensation due to patient's AMS  - Radial artery palpated   - Capillary Refill <3s    LUE:  - Purpuric rash with areas of desquamation.  - No swelling  - NonTTP throughout  - Makes a composite fist when prompted   - Unable to assess ROM, strength, and sensation due to patient's AMS  - Radial artery palpated   - Capillary Refill <3s    RLE:  - Open wound at the anterior knee with a mix of yellow fibrinous exudate and granulation tissue. There is bleeding from the wound edges.   - Purpuric rash throughout  - Mild swelling about the knee  - NonTTP throughout  - Unable to assess ROM, strength, and sensation due to patient's AMS  - Compartments soft  - DP palpated  - Capillary Refill <3s    LLE:  - Purpuric rash throughout  - No swelling  - No ecchymosis, erythema, or signs of cellulitis  - NonTTP throughout  - Unable to assess ROM, strength, and sensation due to patient's AMS  - Compartments soft  - DP and PT palpated  - Capillary Refill <3s     Significant Labs: CBC:   Recent Labs   Lab 06/21/24  1442 06/21/24  1449 06/22/24  0608 " 06/22/24  0940   WBC 20.56*  --  16.83*  --    HGB 12.6  --  11.6*  --    HCT 37.4 40 34.9* 32*     --  221  --      CMP:   Recent Labs   Lab 06/21/24  1442 06/22/24  0608    136   K 3.0* 3.3*    105   CO2 12* 9*   * 127*   BUN 87* 97*   CREATININE 5.0* 5.4*   CALCIUM 8.0* 7.8*   PROT 6.3 6.0   ALBUMIN 1.8* 1.8*   BILITOT 0.2 0.2   ALKPHOS 81 81   AST 14 14   ALT 5* 5*   ANIONGAP 21* 22*     All pertinent labs within the past 24 hours have been reviewed.    Significant Imaging: X-Ray: I have reviewed all pertinent results/findings and my personal findings are:  There is soft tissue swelling about the right knee.  A distal femoral replacement is present with a long diaphyseal fitting Press-Fit tibial component and a stable appearing femoral component.  There is no evidence of loosening or subsidence.

## 2024-06-22 NOTE — HPI
88 YOF with past medical history of CKD3 (baseline creatinine between 1.3-1.5), T2DM, COPD, PVD 2/2 to DM, HTN, HLD, LBP, OA who presented for a skin rash and altered mentation. The patient had a recent I&D (05/16/2024) for her R knee wound and started zosyn. Then 7 days ago she developed full body hives with rash and tongue swelling.       In the ED: patient received fluid resuscitation and IV ceftriaxone. She exhibited signs of minimal skin desqaumation amidst normal vital signs.  Nephrology has been consulted for JOSÉ on CKD. Upon arrival her creatinine on arrival was noted to be 5.0, which is increased from her baseline. Other labs showed severe HAGMA with respiratory acidosis. There does not appear to be a triple acid/base disorder when correcting for her hypoalbuminemia. She was given a bolus of LR upon arrival, UA was collected and showed 1+ ketones, 16 RBCs/ hpf, urine sodium of 19.

## 2024-06-22 NOTE — HOSPITAL COURSE
Pt admitted to  for management of DRESS syndrome as manifested by severe total body rash, AMS, and profound JOSÉ (Cr 5.0 on baseline of 1.3-1.6) due to Zosyn use. She had been utilizing Zosyn for treatment of a LE wound - she had a recent right TKA complicated by a femur fracture due to fall requiring ORIF which was further complicated by infection and complex wound healing. Skin biopsy on arrival confirmed diagnosis of DRESS. Treated with IV and topical steroids per derm, noted to have significant improvement. JOSÉ resolved. Transitioned to long oral steroid taper with plans to follow up with dermatology outpatient. Discussed alternatives to Zosyn with ID, who were concerned for prosthetic joint infection. Not a candidate for fluoroquinolone therapy given long QT, therefore would need surgical source control. 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 Discussed with [facility] ortho team, who only recommended intervention if patient became unstable, otherwise recommended continued follow up with her primary surgeon at . Discussed with her plastic surgeon, Dr. Wang, who suggested that plan with her ortho team (Dr. Hernandez) was for right AKA. Unable to transfer to  given that Dr. Hernandez is not on service, therefore plan to discharge to [facility] with continued outpatient follow up.     Pt is stable for discharge to SNF. Discharging with course of po linezolid for continued management of VRE wound infection and close outpatient ID follow up. She will also need continued follow up with ortho. Also discharging with long steroid taper course along with PJP ppx, steroid cream, and lubricating eye drops. She has a follow up appointment with derm scheduled 7/8.

## 2024-06-22 NOTE — SUBJECTIVE & OBJECTIVE
Past Medical History:   Diagnosis Date    Arthritis     Gout     High cholesterol     Hypertension        Past Surgical History:   Procedure Laterality Date    ANKLE SURGERY      CHOLECYSTECTOMY      HYSTERECTOMY         Review of patient's allergies indicates:   Allergen Reactions    Zosyn [piperacillin-tazobactam] Hives    Gabapentin Hallucinations     Current Facility-Administered Medications   Medication Frequency    atorvastatin tablet 10 mg Daily    cetirizine tablet 5 mg Daily    dextrose 10% bolus 125 mL 125 mL PRN    dextrose 10% bolus 250 mL 250 mL PRN    glucagon (human recombinant) injection 1 mg PRN    glucose chewable tablet 16 g PRN    glucose chewable tablet 24 g PRN    heparin (porcine) injection 5,000 Units Q8H    hydrOXYzine HCL tablet 25 mg TID PRN    insulin aspart U-100 pen 0-5 Units QID (AC + HS) PRN    lactated ringers bolus 1,000 mL Once    lactated ringers infusion Continuous    naloxone 0.4 mg/mL injection 0.02 mg PRN    QUEtiapine tablet 25 mg QHS    sodium chloride 0.9% flush 10 mL Q12H PRN    triamcinolone acetonide 0.1% cream BID     Family History    None       Tobacco Use    Smoking status: Never    Smokeless tobacco: Never   Substance and Sexual Activity    Alcohol use: No    Drug use: No    Sexual activity: Not on file     Review of Systems   Reason unable to perform ROS: Patient confused.     Objective:     Vital Signs (Most Recent):  Temp: 97 °F (36.1 °C) (06/22/24 1547)  Pulse: 70 (06/22/24 1547)  Resp: 18 (06/22/24 1547)  BP: (!) 114/54 (06/22/24 1547)  SpO2: 98 % (06/22/24 1547) Vital Signs (24h Range):  Temp:  [96.5 °F (35.8 °C)-97.6 °F (36.4 °C)] 97 °F (36.1 °C)  Pulse:  [69-89] 70  Resp:  [16-20] 18  SpO2:  [91 %-100 %] 98 %  BP: (104-149)/() 114/54     Weight: 84.4 kg (186 lb) (06/21/24 1950)  Body mass index is 34.02 kg/m².  Body surface area is 1.92 meters squared.    I/O last 3 completed shifts:  In: 999 [IV Piggyback:999]  Out: 100 [Urine:100]     Physical  Exam  Constitutional:       Appearance: She is ill-appearing and toxic-appearing.      Comments: Diffuse rash noted over face, extremities, and abdomen.    HENT:      Mouth/Throat:      Mouth: Oral lesions present.      Tongue: Lesions present.   Cardiovascular:      Rate and Rhythm: Normal rate and regular rhythm.      Pulses: Normal pulses.      Heart sounds: Normal heart sounds.   Pulmonary:      Effort: Pulmonary effort is normal. No respiratory distress.      Breath sounds: Normal breath sounds. No wheezing or rales.   Chest:      Chest wall: No tenderness.   Abdominal:      General: Abdomen is flat.      Palpations: Abdomen is soft.   Musculoskeletal:      Right lower leg: No edema.      Left lower leg: No edema.   Skin:     Capillary Refill: Capillary refill takes 2 to 3 seconds.      Findings: Rash present.   Neurological:      Mental Status: She is disoriented.          Significant Labs:  CBC:   Recent Labs   Lab 06/22/24  0608 06/22/24  0940   WBC 16.83*  --    RBC 3.63*  --    HGB 11.6*  --    HCT 34.9* 32*     --    MCV 96  --    MCH 32.0*  --    MCHC 33.2  --      CMP:   Recent Labs   Lab 06/22/24  1605   *   CALCIUM 7.8*   ALBUMIN 1.8*   PROT 5.9*      K 3.4*   CO2 7*      BUN 97*   CREATININE 5.1*   ALKPHOS 67   ALT 5*   AST 18   BILITOT 0.2     All labs within the past 24 hours have been reviewed.    Significant Imaging:

## 2024-06-22 NOTE — CONSULTS
[facility]  Nephrology  Consult Note    Patient Name: Dahlia Spence  MRN: [id number]  Admission Date: 6/21/2024  Hospital Length of Stay: 0 days  Attending Provider: Dwight Granados MD   Primary Care Physician: Frandy Roe MD  Principal Problem:<principal problem not specified>    Inpatient consult to Nephrology  Consult performed by: Alex Rivero MD  Consult ordered by: Kimmie Hoyt DO  Reason for consult: JOSÉ        Subjective:     HPI: 88 YOF with past medical history of CKD3 (baseline creatinine between 1.3-1.5), T2DM, COPD, PVD 2/2 to DM, HTN, HLD, LBP, OA who presented for a skin rash and altered mentation. The patient had a recent I&D (05/16/2024) for her R knee wound and started zosyn. Then 7 days ago she developed full body hives with rash and tongue swelling.       In the ED: patient received fluid resuscitation and IV ceftriaxone. She exhibited signs of minimal skin desqaumation amidst normal vital signs.  Nephrology has been consulted for JOSÉ on CKD. Upon arrival her creatinine on arrival was noted to be 5.0, which is increased from her baseline. Other labs showed severe HAGMA with respiratory acidosis. There does not appear to be a triple acid/base disorder when correcting for her hypoalbuminemia. She was given a bolus of LR upon arrival, UA was collected and showed 1+ ketones, 16 RBCs/ hpf, urine sodium of 19.     Past Medical History:   Diagnosis Date    Arthritis     Gout     High cholesterol     Hypertension        Past Surgical History:   Procedure Laterality Date    ANKLE SURGERY      CHOLECYSTECTOMY      HYSTERECTOMY         Review of patient's allergies indicates:   Allergen Reactions    Zosyn [piperacillin-tazobactam] Hives    Gabapentin Hallucinations     Current Facility-Administered Medications   Medication Frequency    atorvastatin tablet 10 mg Daily    cetirizine tablet 5 mg Daily    dextrose 10% bolus 125 mL 125 mL PRN    dextrose 10% bolus 250 mL 250  mL PRN    glucagon (human recombinant) injection 1 mg PRN    glucose chewable tablet 16 g PRN    glucose chewable tablet 24 g PRN    heparin (porcine) injection 5,000 Units Q8H    hydrOXYzine HCL tablet 25 mg TID PRN    insulin aspart U-100 pen 0-5 Units QID (AC + HS) PRN    lactated ringers bolus 1,000 mL Once    lactated ringers infusion Continuous    naloxone 0.4 mg/mL injection 0.02 mg PRN    QUEtiapine tablet 25 mg QHS    sodium chloride 0.9% flush 10 mL Q12H PRN    triamcinolone acetonide 0.1% cream BID     Family History    None       Tobacco Use    Smoking status: Never    Smokeless tobacco: Never   Substance and Sexual Activity    Alcohol use: No    Drug use: No    Sexual activity: Not on file     Review of Systems   Reason unable to perform ROS: Patient confused.     Objective:     Vital Signs (Most Recent):  Temp: 97 °F (36.1 °C) (06/22/24 1547)  Pulse: 70 (06/22/24 1547)  Resp: 18 (06/22/24 1547)  BP: (!) 114/54 (06/22/24 1547)  SpO2: 98 % (06/22/24 1547) Vital Signs (24h Range):  Temp:  [96.5 °F (35.8 °C)-97.6 °F (36.4 °C)] 97 °F (36.1 °C)  Pulse:  [69-89] 70  Resp:  [16-20] 18  SpO2:  [91 %-100 %] 98 %  BP: (104-149)/() 114/54     Weight: 84.4 kg (186 lb) (06/21/24 1950)  Body mass index is 34.02 kg/m².  Body surface area is 1.92 meters squared.    I/O last 3 completed shifts:  In: 999 [IV Piggyback:999]  Out: 100 [Urine:100]     Physical Exam  Constitutional:       Appearance: She is ill-appearing and toxic-appearing.      Comments: Diffuse rash noted over face, extremities, and abdomen.    HENT:      Mouth/Throat:      Mouth: Oral lesions present.      Tongue: Lesions present.   Cardiovascular:      Rate and Rhythm: Normal rate and regular rhythm.      Pulses: Normal pulses.      Heart sounds: Normal heart sounds.   Pulmonary:      Effort: Pulmonary effort is normal. No respiratory distress.      Breath sounds: Normal breath sounds. No wheezing or rales.   Chest:      Chest wall: No tenderness.    Abdominal:      General: Abdomen is flat.      Palpations: Abdomen is soft.   Musculoskeletal:      Right lower leg: No edema.      Left lower leg: No edema.   Skin:     Capillary Refill: Capillary refill takes 2 to 3 seconds.      Findings: Rash present.   Neurological:      Mental Status: She is disoriented.          Significant Labs:  CBC:   Recent Labs   Lab 06/22/24  0608 06/22/24  0940   WBC 16.83*  --    RBC 3.63*  --    HGB 11.6*  --    HCT 34.9* 32*     --    MCV 96  --    MCH 32.0*  --    MCHC 33.2  --      CMP:   Recent Labs   Lab 06/22/24  1605   *   CALCIUM 7.8*   ALBUMIN 1.8*   PROT 5.9*      K 3.4*   CO2 7*      BUN 97*   CREATININE 5.1*   ALKPHOS 67   ALT 5*   AST 18   BILITOT 0.2     All labs within the past 24 hours have been reviewed.    Significant Imaging:    Assessment/Plan:     Renal/  Metabolic acidosis, increased anion gap  See JOSÉ    JOSÉ (acute kidney injury)  Urine microscopy showed course granular cast consistent with ATN. The sample was heavily populated with squamous cells, making identification of WBCs or RBCs difficult however urine dipstick done in our office today did not reveal any WBCs or RBCs which contrast the urine dipstick preformed on arrival. Due to overpopulation of squamous cells in the urine sample, AIN cannot be fully excluded at this time.     Recommendations  -Yuen placement for accurate I's and O's  -Continuous IV fluids, would recommend either the use of LR or the use of isotonic bicarb with potassium supplementation.   -No acute indication for RRT at this time, however if acidosis does not improve then will consider starting HD at that time.   -Avoid nephrotoxic agents (IV contrast, NSAID's, gadolinium contrast, PPI's) if able.   -Maintain MAP above 65 mmHg.   -Strict I's and O's  -Daily renal function panel  -Renally dose all medications        Thank you for your consult. I will follow-up with patient. Please contact us if you have  any additional questions.    Alex Rivero MD  Nephrology  [facility]

## 2024-06-22 NOTE — NURSING
Pt admitted to room 64997 on TSU via stretcher. Plan of care reviewed with pt and daughter. Pt oriented to room. Bed locked in lowest position, bed alarm on, and call light within reach.

## 2024-06-22 NOTE — ASSESSMENT & PLAN NOTE
88-year-old female with history of right TKA  c/b distal femur periprosthetic fracture secondary to a fall s/p right knee revision of TKA with distal femoral replacement on 4/29 (completed empiric Linezolid > Daptomycin x 10 days through 5/11). Course complicated by right knee wound dehiscence s/p surgical I&D 5/16 followed by skin graft placement on 5/23. Cultures 5/16 grew Proteus and Pseudomonas. There were concerns for possible hardware involvement (Op note with dehiscence of inferior aspect of the capsular repair). Patient was discharged on six weeks of IV Zosyn from I&D (EOC 6/27). She followed up with ID on 6/6 and knee graft noted to be necrotic with slough present.  Wound cultures  grew CONS and alpha hemalytic strep. It is unclear if patient was started on a new antibiotic since this time.    Patient presents with diffuse rash and tongue swelling. In the ED: afebrile and VSS. WBC 20K, eosinophilia noted,K 3, Cr 5, , ESR 61. She was given IVF, cefepime, antihistamine, steroids in ED.  ID consulted for recommendations.    Recommendations  Discontinue and hold all antibiotics today as beta lactam antibiotics are known to potentially cause allergic reactions/drug eruptions   Please have case management get records of medication list from patient's facility to assess if she has been on any new medications/antibiotics other than Zosyn  Agree with Dermatology consult  Orthopedic surgery consulted  Discussed plan with ID staff. ID will follow closely.

## 2024-06-22 NOTE — SUBJECTIVE & OBJECTIVE
Past Medical History:   Diagnosis Date    Arthritis     Gout     High cholesterol     Hypertension        Past Surgical History:   Procedure Laterality Date    ANKLE SURGERY      CHOLECYSTECTOMY      HYSTERECTOMY       Family History    None       Tobacco Use    Smoking status: Never    Smokeless tobacco: Never   Substance and Sexual Activity    Alcohol use: No    Drug use: No    Sexual activity: Not on file       Review of patient's allergies indicates:   Allergen Reactions    Zosyn [piperacillin-tazobactam] Hives    Gabapentin Hallucinations       Medications:  Continuous Infusions:  Scheduled Meds:   atorvastatin  10 mg Oral Daily    cetirizine  5 mg Oral Daily    heparin (porcine)  5,000 Units Subcutaneous Q8H    QUEtiapine  25 mg Oral QHS    triamcinolone acetonide 0.025%   Topical (Top) Daily     PRN Meds:  Current Facility-Administered Medications:     dextrose 10%, 12.5 g, Intravenous, PRN    dextrose 10%, 25 g, Intravenous, PRN    glucagon (human recombinant), 1 mg, Intramuscular, PRN    glucose, 16 g, Oral, PRN    glucose, 24 g, Oral, PRN    hydrOXYzine HCL, 25 mg, Oral, TID PRN    insulin aspart U-100, 0-5 Units, Subcutaneous, QID (AC + HS) PRN    naloxone, 0.02 mg, Intravenous, PRN    sodium chloride 0.9%, 10 mL, Intravenous, Q12H PRN    Review of Systems  Objective:     Vital Signs (Most Recent):  Temp: 97.6 °F (36.4 °C) (06/22/24 1157)  Pulse: 73 (06/22/24 1157)  Resp: 18 (06/22/24 1157)  BP: (!) 130/58 (06/22/24 1157)  SpO2: 100 % (06/22/24 1157) Vital Signs (24h Range):  Temp:  [96.5 °F (35.8 °C)-98 °F (36.7 °C)] 97.6 °F (36.4 °C)  Pulse:  [69-89] 73  Resp:  [16-20] 18  SpO2:  [91 %-100 %] 100 %  BP: (104-149)/() 130/58     Weight: 84.4 kg (186 lb)  Body mass index is 34.02 kg/m².     Physical Exam   Constitutional: She is obese.    Neurological: She is alert.   Skin:   Areas Examined (abnormalities noted in diagram):   Head / Face Inspection Performed  Neck Inspection Performed  Chest / Axilla  "Inspection Performed  Abdomen Inspection Performed  Genitals / Buttocks / Groin Inspection Performed  Back Inspection Performed  RUE Inspected  LUE Inspection Performed  RLE Inspected  LLE Inspection Performed  Nails and Digits Inspection Performed  Gland Inspection Performed  -Diffuse erythema and few papules present on areas of abdomen, legs, arms, back, face. Potentially facial edema per daughter compared to baseline. Few areas of superficial desquamation noted on face, arms, abdomen, groin,   -no erosions or hemecrusting of mouth, mucosa, conjunctiva  -no targetoid lesion, no pustules, vesicles or bullae present    -no appreciable erythema  -Buttock/groin region- fecal material present, intergluteal fold and areas on upper thigh/vaginal region with raw erythema and desquamation                                                                                     Significant Labs: CBC:   Recent Labs   Lab 06/21/24  1442 06/21/24  1449 06/22/24  0608 06/22/24  0940   WBC 20.56*  --  16.83*  --    HGB 12.6  --  11.6*  --    HCT 37.4 40 34.9* 32*     --  221  --      CMP:   Recent Labs   Lab 06/21/24  1442 06/22/24  0608    136   K 3.0* 3.3*    105   CO2 12* 9*   * 127*   BUN 87* 97*   CREATININE 5.0* 5.4*   CALCIUM 8.0* 7.8*   PROT 6.3 6.0   ALBUMIN 1.8* 1.8*   BILITOT 0.2 0.2   ALKPHOS 81 81   AST 14 14   ALT 5* 5*   ANIONGAP 21* 22*     Coagulation: No results for input(s): "PT", "INR", "APTT" in the last 48 hours.  RPR: No results for input(s): "RPR" in the last 48 hours.  TSH:   Recent Labs   Lab 05/03/24  0420   TSH 1.65         "

## 2024-06-22 NOTE — PLAN OF CARE
CHW called [facility] to request medication list. Bandar (charge nurse) took a message and stated he will ask his boss Halle if he can go back into a patient's chart once they are d/c. CHW gave Bandar Gloria number to call with information.

## 2024-06-22 NOTE — CONSULTS
[facility]  Orthopedics  Consult Note    Patient Name: Dahlia Spence  MRN: [id number]  Admission Date: 6/21/2024  Hospital Length of Stay: 0 days  Attending Provider: Dwight Granados MD  Primary Care Provider: Frandy Roe MD    Inpatient consult to Orthopedic Surgery  Consult performed by: LIVAN Boucher MD  Consult ordered by: Oliva Harp MD        Subjective:     Principal Problem:<principal problem not specified>    Chief Complaint:   Chief Complaint   Patient presents with    Allergic Reaction     Allergic reaction to Abx for celluitis, full body hives and itching, tongue swelling, no SOB, airway protected.       HPI: Dahlia Spence is a 88 y.o. female with a PMHx of CKD3, T2DM, COPD, PVD, HTN, HLD who is currently admitted for drug-induced systemic response and hives. Patient had a right TKA done by Dr. Hernandez on 4/22/24.  Unfortunately, on the day of surgery while working with physical therapy she had a fall and suffered a periprosthetic distal femur fracture.  She underwent  right distal femur replacement on 04/29/2024 which was complicated by wound dehiscence an infection.  Her cultures grew Pseudomonas and Proteus.  She subsequently underwent I&D of RLE with wound VAC application on 5/16/24.  Plastic surgery performed a right gastroc flap, split thickness skin graft on 05/23/2024 and placed a wound VAC. Since that time, the patient has been on IV Zosyn (end date 06/27/2024) and has been receiving local wound care with Dakin's and gentamicin ointment. Her flap has not fully granulated to date.     She is currently residing at [facility]  Smoking: nonsmoker since the 1980's. Prior 10 pack year history   No history of IV drug use.    She does not take any immunosuppressant medications    Implants: Dhiraj distal femoral replacement size small femur, 30 mm extension with 11 x 127 bowed stem, 4 tibia with 13 x 100 stem, rotating component, 11 mm polyethylene       "  Objective:      Vital Signs (Most Recent):  Temp: 96.5 °F (35.8 °C) (06/22/24 0725)  Pulse: 71 (06/22/24 1054)  Resp: 20 (06/22/24 0725)  BP: (!) 104/52 (06/22/24 0725)  SpO2: 99 % (06/22/24 0800) Vital Signs (24h Range):  Temp:  [96.5 °F (35.8 °C)-98 °F (36.7 °C)] 96.5 °F (35.8 °C)  Pulse:  [69-89] 71  Resp:  [16-20] 20  SpO2:  [91 %-100 %] 99 %  BP: (104-149)/() 104/52      Weight: 84.4 kg (186 lb)  Height: 5' 2" (157.5 cm)  Body mass index is 34.02 kg/m².     Intake/Output Summary (Last 24 hours) at 6/22/2024 1147  Last data filed at 6/22/2024 1030      Gross per 24 hour   Intake 1059 ml   Output 100 ml   Net 959 ml      Ortho/SPM Exam  General:  no acute distress, appears stated age   Neuro: alert and oriented x3  Psych: normal mood  Head: normocephalic, atraumatic.  Eyes: no scleral icterus  Mouth: moist mucous membranes  CV: extremities warm and well perfused  Pulm: breathing comfortably, equal chest rise bilat  Skin: widespread purpuric rash with areas of desquamation. Open wound at the right knee     MSK:     RUE:  - Purpuric rash with areas of desquamation at the medial arm   - No swelling  - NonTTP throughout  - Makes a composite fist when prompted   - Unable to assess ROM, strength, and sensation due to patient's AMS  - Radial artery palpated   - Capillary Refill <3s     LUE:  - Purpuric rash with areas of desquamation.  - No swelling  - NonTTP throughout  - Makes a composite fist when prompted   - Unable to assess ROM, strength, and sensation due to patient's AMS  - Radial artery palpated   - Capillary Refill <3s     RLE:  - Open wound at the anterior knee with a mix of yellow fibrinous exudate and granulation tissue. There is bleeding from the wound edges.   - Purpuric rash throughout  - Mild swelling about the knee  - NonTTP throughout  - Unable to assess ROM, strength, and sensation due to patient's AMS  - Compartments soft  - DP palpated  - Capillary Refill <3s     LLE:  - Purpuric rash " throughout  - No swelling  - No ecchymosis, erythema, or signs of cellulitis  - NonTTP throughout  - Unable to assess ROM, strength, and sensation due to patient's AMS  - Compartments soft  - DP and PT palpated  - Capillary Refill <3s     Significant Labs: CBC:          Recent Labs   Lab 06/21/24  1442 06/21/24  1449 06/22/24  0608 06/22/24  0940   WBC 20.56*  --  16.83*  --    HGB 12.6  --  11.6*  --    HCT 37.4 40 34.9* 32*     --  221  --       CMP:        Recent Labs   Lab 06/21/24  1442 06/22/24  0608    136   K 3.0* 3.3*    105   CO2 12* 9*   * 127*   BUN 87* 97*   CREATININE 5.0* 5.4*   CALCIUM 8.0* 7.8*   PROT 6.3 6.0   ALBUMIN 1.8* 1.8*   BILITOT 0.2 0.2   ALKPHOS 81 81   AST 14 14   ALT 5* 5*   ANIONGAP 21* 22*      All pertinent labs within the past 24 hours have been reviewed.     Significant Imaging: X-Ray: I have reviewed all pertinent results/findings and my personal findings are:  There is soft tissue swelling about the right knee.  A distal femoral replacement is present with a long diaphyseal fitting Press-Fit tibial component and a stable appearing femoral component.  There is no evidence of loosening or subsidence.    Assessment/Plan:     Open wound of right knee s/p R distal femoral replacement, I&D, and gastrocnemius flap  Dahlia Spence is a 88 y.o. female with a PMHx of CKD3, T2DM, COPD, PVD, HTN, HLD who is currently admitted for drug-induced systemic response and hives.  Orthopedic surgery was consulted to evaluate the open wound of her right knee.  The patient had an initial R TKA on 04/22/2024 which was complicated by periprosthetic fracture requiring conversion to right distal femoral replacement.  This was then complicated by wound dehiscence and infection requiring multiple irrigation and debridements and gastrocnemius flap by Plastic surgery at an outside facility.  In the interim, the patient was been receiving local wound care per Plastic surgery.  Prior  to admission the patient was being treated with a 6 week course of IV Zosyn.  At this time, the patient has been afebrile and hemodynamically stable over the past 24 hours.  She has a leukocytosis to 16.83.  , ESR 61.  She has a metabolic acidosis with a pH of 7.2. The patient likely has a subacute prosthetic joint infection versus soft tissue infection involving her gastroc flap. She is malnourished with an albumin of 1.8. The primary objective should be optimizing her nutrition, strict control of her blood sugars, and local wound care to give her the best chance of healing her wound.     - Optimize nutrition, recommend Boost with all meals  - Antibiotic selection per ID and dermatology given her presumed systemic reaction to Zosyn  - Continue local wound care; recommend wound care consult for assistance while inpatient   - Continue plan of care per primary surgeon; follow-up on 6/26/24 per family's report     SIMON Boucher MD  Orthopedics  [facility]

## 2024-06-22 NOTE — ASSESSMENT & PLAN NOTE
Chronic, controlled. Latest blood pressure and vitals reviewed-     Temp:  [97.9 °F (36.6 °C)-98 °F (36.7 °C)]   Pulse:  [75-81]   Resp:  [16-20]   BP: (120-145)/(58-88)   SpO2:  [99 %] .   Home meds for hypertension were reviewed and noted below.   Hypertension Medications               lisinopriL-hydrochlorothiazide (PRINZIDE,ZESTORETIC) 20-12.5 mg per tablet TAKE 1 TABLET BY MOUTH EVERY DAY            While in the hospital, will manage blood pressure as follows; Adjust home antihypertensive regimen as follows- HOLD    Will utilize p.r.n. blood pressure medication only if patient's blood pressure greater than 180/110 and she develops symptoms such as worsening chest pain or shortness of breath.

## 2024-06-22 NOTE — ASSESSMENT & PLAN NOTE
Urine microscopy showed course granular cast consistent with ATN. The sample was heavily populated with squamous cells, making identification of WBCs or RBCs difficult however urine dipstick done in our office today did not reveal any WBCs or RBCs which contrast the urine dipstick preformed on arrival. Due to overpopulation of squamous cells in the urine sample, AIN cannot be fully excluded at this time.     Recommendations  -Yuen placement for accurate I's and O's  -Continuous IV fluids, would recommend either the use of LR or the use of isotonic bicarb with potassium supplementation.   -No acute indication for RRT at this time, however if acidosis does not improve then will consider starting HD at that time.   -Avoid nephrotoxic agents (IV contrast, NSAID's, gadolinium contrast, PPI's) if able.   -Maintain MAP above 65 mmHg.   -Strict I's and O's  -Daily renal function panel  -Renally dose all medications

## 2024-06-22 NOTE — HPI
Dahlia Spence is an 88-year-old female with past medical history of hypertension, hyperlipidemia, osteoarthritis, DM, PVD who presented to [facility]21 for a diffuse skin rash.     Patient has a history of right TKA 4/22/2024. She was working with physical therapy postoperatively and sustained a distal femur periprosthetic fracture of the right knee secondary to a fall. Due to proximity with knee arthroplasty, she underwent a right knee revision of TKA with distal femoral replacement on 4/29/2024. Due to increased risk of post op infection, ID was consulted and recommended 10 days of empiric antibiotics. She was initially on Linezolid (d/c due to thrombocytopenia) and completed course with Daptomycin (EOC 5/11). Patient was discharged to a nursing facility though represented 05/15/2024 with wound dehiscence. On 5/16 she underwent surgical I&D followed by muscle flap and skin graft placement on 5/23. Cultures 5/16 grew pan susceptible Proteus and Pseudomonas. There were concerns for possible hardware involvement (Op note with dehiscence of inferior aspect of the capsular repair). ID recommended six weeks of IV Zosyn from I&D (EOC 6/27).    Patient followed up with ID on 6/6. Her wound had some evidence of slough and necrosis and she underwent light debridement. Wound cultures  grew CONS and alpha hemalytic strep. Patient's daugher is at bedside and reports she was suppose to start a new oral antibiotic but doesn't believe the patient started in. Over the last week the patient has developed a full body rash and AMS. She was brought to the ED after complaining of tongue swelling.     In the ED: afebrile and VSS. WBC 20K, eosinophilia noted,K 3, Cr 5, , ESR 61. She was given IVF, Cefepime, antihistamine, steroids.  ID consulted for recommendations.

## 2024-06-22 NOTE — HPI
The patient declines any skin pain. Occasional skin itching. Daughter at bedside reports that she feels the rash is not as red. Neprhology evaluated and thought potentially consistent with ATN but AIN could not be excluded.  ID evaluated and recommended holding antibiotics    The patient has remained afebrile and not having any tachycardia.     6/23 WBC 17.11, Hgb 9.7, Percent eos decreased from 6.8% to 1%. CMP with BUN of 93, Cr 4.9, AST and ALT 19/19 6/22 CPK 45, Lactic acid  1.8, Hepatitis C ab negative, HIV ab negative

## 2024-06-23 PROBLEM — E87.6 HYPOKALEMIA: Status: ACTIVE | Noted: 2024-06-23

## 2024-06-23 PROBLEM — E87.20 ACIDOSIS: Status: ACTIVE | Noted: 2024-06-23

## 2024-06-23 LAB
ALBUMIN SERPL BCP-MCNC: 1.8 G/DL (ref 3.5–5.2)
ALP SERPL-CCNC: 68 U/L (ref 55–135)
ALT SERPL W/O P-5'-P-CCNC: 5 U/L (ref 10–44)
ANION GAP SERPL CALC-SCNC: 19 MMOL/L (ref 8–16)
ANION GAP SERPL CALC-SCNC: 19 MMOL/L (ref 8–16)
ANION GAP SERPL CALC-SCNC: 20 MMOL/L (ref 8–16)
ANISOCYTOSIS BLD QL SMEAR: SLIGHT
AST SERPL-CCNC: 19 U/L (ref 10–40)
BASO STIPL BLD QL SMEAR: ABNORMAL
BASOPHILS # BLD AUTO: ABNORMAL K/UL (ref 0–0.2)
BASOPHILS NFR BLD: 1 % (ref 0–1.9)
BILIRUB SERPL-MCNC: 0.2 MG/DL (ref 0.1–1)
BUN SERPL-MCNC: 92 MG/DL (ref 8–23)
BUN SERPL-MCNC: 93 MG/DL (ref 8–23)
BUN SERPL-MCNC: 95 MG/DL (ref 8–23)
BURR CELLS BLD QL SMEAR: ABNORMAL
CALCIUM SERPL-MCNC: 7.3 MG/DL (ref 8.7–10.5)
CALCIUM SERPL-MCNC: 7.3 MG/DL (ref 8.7–10.5)
CALCIUM SERPL-MCNC: 7.4 MG/DL (ref 8.7–10.5)
CHLORIDE SERPL-SCNC: 102 MMOL/L (ref 95–110)
CHLORIDE SERPL-SCNC: 103 MMOL/L (ref 95–110)
CHLORIDE SERPL-SCNC: 105 MMOL/L (ref 95–110)
CO2 SERPL-SCNC: 13 MMOL/L (ref 23–29)
CO2 SERPL-SCNC: 15 MMOL/L (ref 23–29)
CO2 SERPL-SCNC: 17 MMOL/L (ref 23–29)
CREAT SERPL-MCNC: 4.7 MG/DL (ref 0.5–1.4)
CREAT SERPL-MCNC: 4.9 MG/DL (ref 0.5–1.4)
CREAT SERPL-MCNC: 4.9 MG/DL (ref 0.5–1.4)
DIFFERENTIAL METHOD BLD: ABNORMAL
DOHLE BOD BLD QL SMEAR: PRESENT
EOSINOPHIL # BLD AUTO: ABNORMAL K/UL (ref 0–0.5)
EOSINOPHIL NFR BLD: 1 % (ref 0–8)
ERYTHROCYTE [DISTWIDTH] IN BLOOD BY AUTOMATED COUNT: 21.1 % (ref 11.5–14.5)
EST. GFR  (NO RACE VARIABLE): 8.1 ML/MIN/1.73 M^2
EST. GFR  (NO RACE VARIABLE): 8.1 ML/MIN/1.73 M^2
EST. GFR  (NO RACE VARIABLE): 8.5 ML/MIN/1.73 M^2
GIANT PLATELETS BLD QL SMEAR: PRESENT
GLUCOSE SERPL-MCNC: 115 MG/DL (ref 70–110)
GLUCOSE SERPL-MCNC: 118 MG/DL (ref 70–110)
GLUCOSE SERPL-MCNC: 148 MG/DL (ref 70–110)
HCT VFR BLD AUTO: 27.4 % (ref 37–48.5)
HGB BLD-MCNC: 9.7 G/DL (ref 12–16)
IMM GRANULOCYTES # BLD AUTO: ABNORMAL K/UL (ref 0–0.04)
IMM GRANULOCYTES NFR BLD AUTO: ABNORMAL % (ref 0–0.5)
LYMPHOCYTES # BLD AUTO: ABNORMAL K/UL (ref 1–4.8)
LYMPHOCYTES NFR BLD: 8 % (ref 18–48)
MAGNESIUM SERPL-MCNC: 1.8 MG/DL (ref 1.6–2.6)
MCH RBC QN AUTO: 31.9 PG (ref 27–31)
MCHC RBC AUTO-ENTMCNC: 35.4 G/DL (ref 32–36)
MCV RBC AUTO: 90 FL (ref 82–98)
METAMYELOCYTES NFR BLD MANUAL: 2 %
MONOCYTES # BLD AUTO: ABNORMAL K/UL (ref 0.3–1)
MONOCYTES NFR BLD: 5 % (ref 4–15)
MYELOCYTES NFR BLD MANUAL: 3 %
NEUTROPHILS NFR BLD: 71 % (ref 38–73)
NEUTS BAND NFR BLD MANUAL: 9 %
NRBC BLD-RTO: 1 /100 WBC
OVALOCYTES BLD QL SMEAR: ABNORMAL
PHOSPHATE SERPL-MCNC: 8.1 MG/DL (ref 2.7–4.5)
PLATELET # BLD AUTO: 198 K/UL (ref 150–450)
PLATELET BLD QL SMEAR: ABNORMAL
PMV BLD AUTO: 10.6 FL (ref 9.2–12.9)
POCT GLUCOSE: 127 MG/DL (ref 70–110)
POCT GLUCOSE: 160 MG/DL (ref 70–110)
POCT GLUCOSE: 202 MG/DL (ref 70–110)
POIKILOCYTOSIS BLD QL SMEAR: SLIGHT
POLYCHROMASIA BLD QL SMEAR: ABNORMAL
POTASSIUM SERPL-SCNC: 2.6 MMOL/L (ref 3.5–5.1)
POTASSIUM SERPL-SCNC: 3.1 MMOL/L (ref 3.5–5.1)
POTASSIUM SERPL-SCNC: 3.6 MMOL/L (ref 3.5–5.1)
PROT SERPL-MCNC: 5.5 G/DL (ref 6–8.4)
RBC # BLD AUTO: 3.04 M/UL (ref 4–5.4)
SMUDGE CELLS BLD QL SMEAR: PRESENT
SODIUM SERPL-SCNC: 135 MMOL/L (ref 136–145)
SODIUM SERPL-SCNC: 139 MMOL/L (ref 136–145)
SODIUM SERPL-SCNC: 139 MMOL/L (ref 136–145)
SPHEROCYTES BLD QL SMEAR: ABNORMAL
TOXIC GRANULES BLD QL SMEAR: PRESENT
WBC # BLD AUTO: 17.11 K/UL (ref 3.9–12.7)

## 2024-06-23 PROCEDURE — 80053 COMPREHEN METABOLIC PANEL: CPT

## 2024-06-23 PROCEDURE — A4216 STERILE WATER/SALINE, 10 ML: HCPCS | Performed by: INTERNAL MEDICINE

## 2024-06-23 PROCEDURE — 99223 1ST HOSP IP/OBS HIGH 75: CPT | Mod: ,,, | Performed by: ORTHOPAEDIC SURGERY

## 2024-06-23 PROCEDURE — A4216 STERILE WATER/SALINE, 10 ML: HCPCS

## 2024-06-23 PROCEDURE — 63600175 PHARM REV CODE 636 W HCPCS: Performed by: INTERNAL MEDICINE

## 2024-06-23 PROCEDURE — 84100 ASSAY OF PHOSPHORUS: CPT

## 2024-06-23 PROCEDURE — 63600175 PHARM REV CODE 636 W HCPCS

## 2024-06-23 PROCEDURE — 25000003 PHARM REV CODE 250

## 2024-06-23 PROCEDURE — 20600001 HC STEP DOWN PRIVATE ROOM

## 2024-06-23 PROCEDURE — 25000003 PHARM REV CODE 250: Performed by: INTERNAL MEDICINE

## 2024-06-23 PROCEDURE — 36410 VNPNXR 3YR/> PHY/QHP DX/THER: CPT

## 2024-06-23 PROCEDURE — C1751 CATH, INF, PER/CENT/MIDLINE: HCPCS

## 2024-06-23 PROCEDURE — 83735 ASSAY OF MAGNESIUM: CPT

## 2024-06-23 PROCEDURE — 76937 US GUIDE VASCULAR ACCESS: CPT

## 2024-06-23 PROCEDURE — 85027 COMPLETE CBC AUTOMATED: CPT

## 2024-06-23 PROCEDURE — 80048 BASIC METABOLIC PNL TOTAL CA: CPT | Mod: XB | Performed by: INTERNAL MEDICINE

## 2024-06-23 PROCEDURE — 99233 SBSQ HOSP IP/OBS HIGH 50: CPT | Mod: ,,, | Performed by: PHYSICIAN ASSISTANT

## 2024-06-23 PROCEDURE — 85007 BL SMEAR W/DIFF WBC COUNT: CPT

## 2024-06-23 RX ORDER — SODIUM CHLORIDE 0.9 % (FLUSH) 0.9 %
10 SYRINGE (ML) INJECTION EVERY 6 HOURS
Status: DISCONTINUED | OUTPATIENT
Start: 2024-06-23 | End: 2024-07-04 | Stop reason: HOSPADM

## 2024-06-23 RX ORDER — POTASSIUM CHLORIDE 7.45 MG/ML
10 INJECTION INTRAVENOUS
Status: COMPLETED | OUTPATIENT
Start: 2024-06-23 | End: 2024-06-23

## 2024-06-23 RX ORDER — SODIUM CHLORIDE 0.9 % (FLUSH) 0.9 %
10 SYRINGE (ML) INJECTION
Status: DISCONTINUED | OUTPATIENT
Start: 2024-06-23 | End: 2024-07-04 | Stop reason: HOSPADM

## 2024-06-23 RX ORDER — METHYLPREDNISOLONE SOD SUCC 125 MG
2 VIAL (EA) INJECTION DAILY
Status: DISCONTINUED | OUTPATIENT
Start: 2024-06-23 | End: 2024-06-23

## 2024-06-23 RX ORDER — HYDROXYZINE HYDROCHLORIDE 25 MG/1
25 TABLET, FILM COATED ORAL ONCE
Status: COMPLETED | OUTPATIENT
Start: 2024-06-23 | End: 2024-06-23

## 2024-06-23 RX ORDER — METHYLPREDNISOLONE SOD SUCC 125 MG
2 VIAL (EA) INJECTION DAILY
Status: DISCONTINUED | OUTPATIENT
Start: 2024-06-24 | End: 2024-06-23

## 2024-06-23 RX ADMIN — SODIUM BICARBONATE: 84 INJECTION, SOLUTION INTRAVENOUS at 08:06

## 2024-06-23 RX ADMIN — HYDROXYZINE HYDROCHLORIDE 25 MG: 25 TABLET ORAL at 02:06

## 2024-06-23 RX ADMIN — ATORVASTATIN CALCIUM 10 MG: 10 TABLET, FILM COATED ORAL at 10:06

## 2024-06-23 RX ADMIN — POTASSIUM BICARBONATE 40 MEQ: 391 TABLET, EFFERVESCENT ORAL at 04:06

## 2024-06-23 RX ADMIN — POTASSIUM CHLORIDE 10 MEQ: 7.46 INJECTION, SOLUTION INTRAVENOUS at 03:06

## 2024-06-23 RX ADMIN — HEPARIN SODIUM 5000 UNITS: 5000 INJECTION INTRAVENOUS; SUBCUTANEOUS at 09:06

## 2024-06-23 RX ADMIN — HEPARIN SODIUM 5000 UNITS: 5000 INJECTION INTRAVENOUS; SUBCUTANEOUS at 05:06

## 2024-06-23 RX ADMIN — POTASSIUM CHLORIDE 10 MEQ: 7.46 INJECTION, SOLUTION INTRAVENOUS at 12:06

## 2024-06-23 RX ADMIN — Medication 10 ML: at 05:06

## 2024-06-23 RX ADMIN — POTASSIUM CHLORIDE 10 MEQ: 7.46 INJECTION, SOLUTION INTRAVENOUS at 04:06

## 2024-06-23 RX ADMIN — POTASSIUM CHLORIDE 10 MEQ: 7.46 INJECTION, SOLUTION INTRAVENOUS at 02:06

## 2024-06-23 RX ADMIN — INSULIN ASPART 1 UNITS: 100 INJECTION, SOLUTION INTRAVENOUS; SUBCUTANEOUS at 09:06

## 2024-06-23 RX ADMIN — POTASSIUM BICARBONATE 40 MEQ: 391 TABLET, EFFERVESCENT ORAL at 05:06

## 2024-06-23 RX ADMIN — TRIAMCINOLONE ACETONIDE: 1 CREAM TOPICAL at 08:06

## 2024-06-23 RX ADMIN — HYDROXYZINE HYDROCHLORIDE 25 MG: 25 TABLET, FILM COATED ORAL at 03:06

## 2024-06-23 RX ADMIN — SODIUM BICARBONATE: 84 INJECTION, SOLUTION INTRAVENOUS at 07:06

## 2024-06-23 RX ADMIN — CETIRIZINE HYDROCHLORIDE 5 MG: 5 TABLET, FILM COATED ORAL at 10:06

## 2024-06-23 RX ADMIN — HEPARIN SODIUM 5000 UNITS: 5000 INJECTION INTRAVENOUS; SUBCUTANEOUS at 03:06

## 2024-06-23 RX ADMIN — METHYLPREDNISOLONE SODIUM SUCCINATE 168.8 MG: 125 INJECTION, POWDER, FOR SOLUTION INTRAMUSCULAR; INTRAVENOUS at 10:06

## 2024-06-23 NOTE — PROGRESS NOTES
[facility]  Infectious Disease  Progress Note    Patient Name: Dahlia Spence  MRN: [id number]  Admission Date: 6/21/2024  Length of Stay: 1 days  Attending Physician: Dwight Granados MD  Primary Care Provider: Frandy Roe MD    Isolation Status: No active isolations  Assessment/Plan:      Orthopedic  Open wound of right knee s/p R distal femoral replacement, I&D, and gastrocnemius flap    88-year-old female with history of right TKA  c/b distal femur periprosthetic fracture secondary to a fall s/p right knee revision of TKA with distal femoral replacement on 4/29 (completed empiric Linezolid > Daptomycin x 10 days through 5/11). Course complicated by right knee wound dehiscence s/p surgical I&D 5/16 followed by muscle flap/skin graft placement on 5/23. Cultures 5/16 grew Proteus and Pseudomonas. There were concerns for possible hardware involvement (Op note with dehiscence of inferior aspect of the capsular repair). Patient was discharged on six weeks of IV Zosyn from I&D (EOC 6/27). She followed up with ID on 6/6 and knee graft noted to be necrotic with slough present.  Wound cultures grew CONS and alpha hemalytic strep. It is unclear if patient was started on a new antibiotic since this time.    Patient presents with diffuse erythematous rash and tongue/facial swelling concerning for drug eruption and potentially DRESS. ID consulted for recommendations.    Recommendations  Discontinue and hold all antibiotics as antibiotics are known to potentially cause severe allergic reactions/drug eruptions   Case management is attempting to obtain records of medication list from patient's nursing facility to assess if she has been on any new medications/antibiotics other than Zosyn  Dermatology is following. Ok to start systemic steroids from an ID standpoint.   Orthopedic surgery and Nephrology are following.   Discussed plan with ID staff. ID will follow closely.         Thank you for the  consult. Please secure chat for any questions.  Vanessa Pickering PA-C    Subjective:     Principal Problem:<principal problem not specified>    HPI: Dahlia Spence is an 88-year-old female with past medical history of hypertension, hyperlipidemia, osteoarthritis, DM, PVD who presented to [facility] [place]/21 for a diffuse skin rash.     Patient has a history of right TKA 4/22/2024. She was working with physical therapy postoperatively and sustained a distal femur periprosthetic fracture of the right knee secondary to a fall. Due to proximity with knee arthroplasty, she underwent a right knee revision of TKA with distal femoral replacement on 4/29/2024. Due to increased risk of post op infection, ID was consulted and recommended 10 days of empiric antibiotics. She was initially on Linezolid (d/c due to thrombocytopenia) and completed course with Daptomycin (EOC 5/11). Patient was discharged to a nursing facility though represented 05/15/2024 with wound dehiscence. On 5/16 she underwent surgical I&D followed by muscle flap and skin graft placement on 5/23. Cultures 5/16 grew pan susceptible Proteus and Pseudomonas. There were concerns for possible hardware involvement (Op note with dehiscence of inferior aspect of the capsular repair). ID recommended six weeks of IV Zosyn from I&D (EOC 6/27).    Patient followed up with ID on 6/6. Her wound had some evidence of slough and necrosis and she underwent light debridement. Wound cultures  grew CONS and alpha hemalytic strep. Patient's daugher is at bedside and reports she was suppose to start a new oral antibiotic but doesn't believe the patient started in. Over the last week the patient has developed a full body rash and AMS. She was brought to the ED after complaining of tongue swelling.     In the ED: afebrile and VSS. WBC 20K, eosinophilia noted,K 3, Cr 5, , ESR 61. She was given IVF, Cefepime, antihistamine, steroids.  ID consulted for recommendations.  Interval  History: Afebrile. WBC downtrending. Daughter feels her mom is more swollen today. Discussed with primary team concerns for DRESS. Plan to start steroids. Awaiting medication list from nursing facility.    Review of Systems   Constitutional:  Positive for activity change and appetite change. Negative for chills, diaphoresis and fever.   HENT:  Positive for facial swelling.         Tongue swelling   Respiratory:  Negative for cough and shortness of breath.    Cardiovascular:  Negative for chest pain.   Gastrointestinal:  Negative for abdominal pain, diarrhea, nausea and vomiting.   Genitourinary:  Positive for decreased urine volume. Negative for difficulty urinating and dysuria.   Musculoskeletal:  Positive for arthralgias and joint swelling. Negative for back pain.   Skin:  Positive for color change, rash and wound.   Neurological:  Negative for headaches.   Psychiatric/Behavioral:  Positive for decreased concentration. Negative for agitation and confusion. The patient is not nervous/anxious.      Objective:     Vital Signs (Most Recent):  Temp: 97 °F (36.1 °C) (06/23/24 1226)  Pulse: 67 (06/23/24 1226)  Resp: 18 (06/23/24 1226)  BP: (!) 132/91 (06/23/24 1226)  SpO2: 98 % (06/23/24 1226) Vital Signs (24h Range):  Temp:  [97 °F (36.1 °C)-97.5 °F (36.4 °C)] 97 °F (36.1 °C)  Pulse:  [] 67  Resp:  [18] 18  SpO2:  [98 %-99 %] 98 %  BP: (114-140)/(54-91) 132/91     Weight: 84.4 kg (186 lb)  Body mass index is 34.02 kg/m².    Estimated Creatinine Clearance: 8.1 mL/min (A) (based on SCr of 4.9 mg/dL (H)).     Physical Exam  Vitals and nursing note reviewed.   Constitutional:       General: She is not in acute distress.     Appearance: Normal appearance. She is well-developed. She is obese. She is ill-appearing.   HENT:      Head: Normocephalic and atraumatic.      Right Ear: External ear normal.      Left Ear: External ear normal.      Nose: Nose normal.   Eyes:      General: No scleral icterus.        Right eye: No  discharge.         Left eye: No discharge.      Extraocular Movements: Extraocular movements intact.      Conjunctiva/sclera: Conjunctivae normal.   Cardiovascular:      Rate and Rhythm: Normal rate.   Pulmonary:      Effort: Pulmonary effort is normal. No respiratory distress.      Breath sounds: No stridor.   Abdominal:      General: There is no distension.      Palpations: Abdomen is soft.      Tenderness: There is no abdominal tenderness.   Musculoskeletal:         General: Tenderness and signs of injury present. Normal range of motion.   Skin:     General: Skin is warm and dry.      Findings: Erythema and rash present.      Comments: Diffuse erythematous rash with skin peeling/desquamation present. See photos below  Necrotic fibrinous knee graft   Neurological:      Mental Status: She is alert. Mental status is at baseline.      Cranial Nerves: No cranial nerve deficit.      Comments: Alert but appears lethargic and not fully oriented   Psychiatric:         Mood and Affect: Mood normal.                                                              Significant Labs: CBC:   Recent Labs   Lab 06/22/24  0608 06/22/24  0940 06/22/24  1719 06/23/24  0300   WBC 16.83*  --  24.06* 17.11*   HGB 11.6*  --  9.9* 9.7*   HCT 34.9* 32* 28.8* 27.4*     --  203 198     CMP:   Recent Labs   Lab 06/22/24  0608 06/22/24  1605 06/23/24  0300 06/23/24  1038    139 135* 139   K 3.3* 3.4* 2.6* 3.1*    108 102 105   CO2 9* 7* 13* 15*   * 128* 118* 115*   BUN 97* 97* 95* 93*   CREATININE 5.4* 5.1* 4.9* 4.9*   CALCIUM 7.8* 7.8* 7.3* 7.3*   PROT 6.0 5.9* 5.5*  --    ALBUMIN 1.8* 1.8* 1.8*  --    BILITOT 0.2 0.2 0.2  --    ALKPHOS 81 67 68  --    AST 14 18 19  --    ALT 5* 5* 5*  --    ANIONGAP 22* 24* 20* 19*     Microbiology Results (last 7 days)       Procedure Component Value Units Date/Time    Blood culture #1 **CANNOT BE ORDERED STAT** [[id number]] Collected: 06/21/24 1443    Order Status: Completed  Specimen: Blood from Peripheral, Antecubital, Right Updated: 06/22/24 1812     Blood Culture, Routine No Growth to date      No Growth to date    Blood culture #2 **CANNOT BE ORDERED STAT** [[id number]] Collected: 06/21/24 1443    Order Status: Completed Specimen: Blood from Peripheral, Antecubital, Right Updated: 06/22/24 1812     Blood Culture, Routine No Growth to date      No Growth to date          Recent Lab Results  (Last 5 results in the past 24 hours)        06/23/24  1238   06/23/24  1038   06/23/24  0845   06/23/24  0300   06/22/24  2207        Albumin       1.8         ALP       68         ALT       5         Anion Gap   19     20         Aniso       Slight         AST       19         Bands       9.0         Baso #       CANCELED  Comment: Result canceled by the ancillary.         Basophilic Stippling       Occasional         Basophil %       1.0         BILIRUBIN TOTAL       0.2  Comment: For infants and newborns, interpretation of results should be based  on gestational age, weight and in agreement with clinical  observations.    Premature Infant recommended reference ranges:  Up to 24 hours.............<8.0 mg/dL  Up to 48 hours............<12.0 mg/dL  3-5 days..................<15.0 mg/dL  6-29 days.................<15.0 mg/dL           BUN   93     95         [place]/Echinocytes       Occasional         Calcium   7.3     7.3         Chloride   105     102         CO2   15     13         Creatinine   4.9     4.9         Urine Creatinine         98.0       Differential Method       Manual         Dohle Bodies       Present         eGFR   8.1     8.1         Eos #       CANCELED  Comment: Result canceled by the ancillary.         Eos %       1.0         Giant Platelets       Present         Glucose   115     118         Gran %       71.0         Hematocrit       27.4         Hemoglobin       9.7         Immature Grans (Abs)       CANCELED  Comment: Mild elevation in immature granulocytes is non specific  and   can be seen in a variety of conditions including stress response,   acute inflammation, trauma and pregnancy. Correlation with other   laboratory and clinical findings is essential.    Result canceled by the ancillary.           Immature Granulocytes       CANCELED  Comment: Result canceled by the ancillary.         Lymph #       CANCELED  Comment: Result canceled by the ancillary.         Lymph %       8.0         Magnesium        1.8         MCH       31.9         MCHC       35.4         MCV       90         Metamyelocytes       2.0         Mono #       CANCELED  Comment: Result canceled by the ancillary.         Mono %       5.0         MPV       10.6         Myelocytes       3.0         nRBC       1         Ovalocytes       Occasional         Phosphorus Level       8.1         Platelet Estimate       Clumped         Platelet Count       198  Comment: @PLCL  [C]         POCT Glucose 160     127           Poikilocytosis       Slight         Poly       Occasional         Potassium   3.1     2.6  Comment: *Critical value notification by pod with confirmation of receipt to   Vanessa Cervantes RN at  Date 6/23/24 Time 3:50pm           Urine Protein/Creatinine Ratio         0.70       PROTEIN TOTAL       5.5         Urine Protein         69       RBC       3.04         RDW       21.1         Smudge Cells       Present  Comment: Smudge cells present;Substantial numbers may affect the   accuracy of the differential.           Sodium   139     135         Spherocytes       Occasional         Toxic Granulation       Present         WBC       17.11                                 [C] - Corrected Result               Significant Imaging:     Imaging Results    None

## 2024-06-23 NOTE — PLAN OF CARE
Update at 5:40 PM  CHW spoke with nurse at SNF, unable to access records since patient discharged.Nurse will contact supervisor to see if additional for assistance. See CHW's plan of care note for further details. Care team aware.      Received message from care team, requested medication list from skilled nursing facility to assess. Needs to determine if patient was on any new medications/antibiotics other than Zosyn. CM reviewed chart for facility name. Patient was at [facility] SNF unit. CM discussed request with CHW and provided instructions. CHW to update CM after speaking with facility staff.       Juani Leonardo RN  Weekend  - [facility]  Spectralink: [phone]

## 2024-06-23 NOTE — PROGRESS NOTES
[facility]  Dermatology  Progress Note    Patient Name: Dahlia Spence  MRN: [id number]  Admission Date: 6/21/2024  Hospital Length of Stay: 1 days  Attending Physician: Dwight Granados MD  Primary Care Provider: Frandy Roe MD     Subjective:     Principal Problem:<principal problem not specified>    Interval History:     Medications:  Continuous Infusions:   sodium bicarbonate 150 mEq in D5W 1,000 mL infusion   Intravenous Continuous 125 mL/hr at 06/23/24 1516 Rate Verify at 06/23/24 1516     Scheduled Meds:   atorvastatin  10 mg Oral Daily    cetirizine  5 mg Oral Daily    heparin (porcine)  5,000 Units Subcutaneous Q8H    [START ON 6/24/2024] methylPREDNISolone sodium succinate injection  2 mg/kg/day Intravenous Daily    potassium chloride  10 mEq Intravenous Q1H    QUEtiapine  25 mg Oral QHS    sodium chloride 0.9%  10 mL Intravenous Q6H    triamcinolone acetonide 0.1%   Topical (Top) BID     PRN Meds:  Current Facility-Administered Medications:     dextrose 10%, 12.5 g, Intravenous, PRN    dextrose 10%, 25 g, Intravenous, PRN    glucagon (human recombinant), 1 mg, Intramuscular, PRN    glucose, 16 g, Oral, PRN    glucose, 24 g, Oral, PRN    hydrOXYzine HCL, 25 mg, Oral, TID PRN    insulin aspart U-100, 0-5 Units, Subcutaneous, QID (AC + HS) PRN    naloxone, 0.02 mg, Intravenous, PRN    sodium chloride 0.9%, 10 mL, Intravenous, Q12H PRN    Flushing PICC/Midline Protocol, , , Until Discontinued **AND** sodium chloride 0.9%, 10 mL, Intravenous, Q6H **AND** sodium chloride 0.9%, 10 mL, Intravenous, PRN    Review of Systems  Objective:     Vital Signs (Most Recent):  Temp: 97 °F (36.1 °C) (06/23/24 1226)  Pulse: 72 (06/23/24 1443)  Resp: 18 (06/23/24 1226)  BP: (!) 132/91 (06/23/24 1226)  SpO2: 98 % (06/23/24 1226) Vital Signs (24h Range):  Temp:  [97 °F (36.1 °C)-97.5 °F (36.4 °C)] 97 °F (36.1 °C)  Pulse:  [] 72  Resp:  [18] 18  SpO2:  [98 %-99 %] 98 %  BP: (114-140)/(54-91) 132/91      Weight: 84.4 kg (186 lb)  Body mass index is 34.02 kg/m².     Physical Exam   Skin:     -Confluent erythematous plaques and satellite and scattered papules / small plaques; associated exfoliation at face, torso, flexures incl groin. Slightly less erythematous today and exfoliation more prominent today. Possible facial edema per daughter compared to baseline. No target / targetoid lesions.   - Otherwise, no erosions, ulcerations, crusting at examined mucosa of eyes, nose, oropharynx.           Significant Labs: CBC:   Recent Labs   Lab 06/22/24  0608 06/22/24  0940 06/22/24  1719 06/23/24  0300   WBC 16.83*  --  24.06* 17.11*   HGB 11.6*  --  9.9* 9.7*   HCT 34.9* 32* 28.8* 27.4*     --  203 198     CMP:   Recent Labs   Lab 06/22/24  0608 06/22/24  1605 06/23/24  0300 06/23/24  1038    139 135* 139   K 3.3* 3.4* 2.6* 3.1*    108 102 105   CO2 9* 7* 13* 15*   * 128* 118* 115*   BUN 97* 97* 95* 93*   CREATININE 5.4* 5.1* 4.9* 4.9*   CALCIUM 7.8* 7.8* 7.3* 7.3*   PROT 6.0 5.9* 5.5*  --    ALBUMIN 1.8* 1.8* 1.8*  --    BILITOT 0.2 0.2 0.2  --    ALKPHOS 81 67 68  --    AST 14 18 19  --    ALT 5* 5* 5*  --    ANIONGAP 22* 24* 20* 19*       Assessment/Plan:   #Rash, favor drug eruption with concern for severe cutaneous adverse reaction, notably DRESS  88yoF w/ hx of CKD3, T2DM, COPD, PVD 2/2 to DM, HTN, HLD, OA who presented for acute widespread exfoliative dermatitis. The patient has been exposed to numerous antibiotics in the past several weeks including linezolid, daptomycin and, most recently, Zosyn ongoing for the past 3 weeks for infectious complications of orthopaedic surgery to R leg.  Currently favor drug reaction with concern for severe cutaneous adverse reaction, notably DRESS (consistent rash appearance, pruritic eruption NOT painful, facial edema, eosinophilia, neutrophilia, possible renal involvement; however, no significant fever, LAD, other systemic involvement)  - possible to  probably by RegiSCAR scoring system. Pathology for DRESS non-specific but will execute biopsy to rule out other SCAR incl early SJS/TEN.     Recommendations:   - Consider:  PCR for HHV6, HHV7, EBV, and CMV.  - Stop all unnecessary medications. Avoid possible culprit meds (exposure typically 2-6 wk prior eruption in DRESS, 7-21 days in exanthematous drug eruption and SJS/TEN) -  Zosyn, Linezolid > daptomycin. Low suspicion for allopurinol given long term med previously well-tolerated.    - Supportive care.   - Triamcinolone BID PRN rash; consider wet wraps for torso, non-infected extremities to jumpstart improvement. See below for protocol.   - Consider systemic corticosteroids (typically 1-2 mg/kg/day of prednisone or equivalent) pending course, weighing risk / benefit in setting of wound infection, T2DM. If initiated, DRESS classically necessitates gradual taper to prevent rebound.    - punch biopsy results pending     Wet wrap therapy:  - Apply thin layer of steroid cream to all affected areas on body.   - Cover with warm, damp cotton Pjs (or wrap with warm, damp towels) first then second layer of dry cotton Pjs (or wrap with large dry towel or blanket).  - Remove after 30 minutes to an hour.   - Re-apply thin layer of steroid cream to all affected areas on body.      Thank you for your consult. Dermatology will continue to follow-up with the patient. Please contact us if you have any additional questions.    Alecia Watkins MD  Dermatology  [facility]

## 2024-06-23 NOTE — PLAN OF CARE
"Patient being following by dermatology, nephrology, and ID. Triamcinolone cream applied per order to generalized rash - patient's daughter reports rash less red as compared to yesterday. Patient appears to be itching less this afternoon. IV Solumedrol given x1 - changed to PO for tomorrow. Creatinine 4.9 - UOP 140cc this shift. Sodium bicarb infusing at 125cc/hr - CO2 15 on AM labs. Nephrology updated on I/O. BMP repeated this afternoon - results pending. Retroperitoneal US done - per report, results showing "chronic medical renal disease." Right knee brace remains in place to immobilize leg. Dressing changed to right knee this afternoon. Wound care consult in place - awaiting assessment. Patient with minimal PO intake this shift - diet changed to soft/bite sized. On waffle mattress, turned/repositioned Q2H, Mepilex in place to bilateral heels. Family at bedside throughout shift. Patient remains DNR.   "

## 2024-06-23 NOTE — PLAN OF CARE
Cr=5.4. K+=3.3. Lactic acid=1.8. WBC=16.83. Afebrile.Remains confused. CO2=9. Phos=9.0. Dr. Harp notified of am CO2 and Phos levels. NS 1 L bolus given. VBG's completed. pH=7.2. IV infiltrated. PICC team RN placed SL via U/S. Pt unable to turn self. Foam dressings applied to heals to protect skin to heels. Also elevated on pillow. Pt has rash all over body. Dermatology consulted. Dr. Watkins evaluated and completed bx to R chest. Gauze dressing to R chest dry and intact. RLE wound noted. Orthopedics consulted. Dr. Boucher evaluated pt and changed dressing to RLE wound.  W->D kerlix with NS covered with ABD and wrapped with kerlix. See pics of all wounds. Wound care consulted for further wound care orders. Brace remains in place to RLE. ID consulted and evaluated pt. Antibiotics on hold for now. Nephrology consulted and evaluated pt. Sample urine given to MD for analysis. Severe excoriation noted to perineal area. Triamcinolone cream applied to skin as ordered. Yuen placed at 1445. 165ml cloudy urine obtained.  Repeat CMP ordered and done this evening. Cr=5.1. K+=3.4. Lab called with CO2=7. Dr. Harp and IM2 team notified. Dr. Rivero with nephrology re-evaluated pt. Phone consent obtained for HD if needed. Clarified plan for further IVF's. LR 1 L bolus given. Plan for Bicarb gtt and K+ replacements IV. CT completed. Waffle mattress applied to bed. Kidney U/S ordered. Triamcinolone changed to skin wraps. Plan to start tonight. Pt only ate few bites food. Drank some water. Having liquid brown stools incontinently. Family states she has not eaten much. Glucoses monitored. No Insulin indicated.

## 2024-06-23 NOTE — SUBJECTIVE & OBJECTIVE
Subjective:     Principal Problem:<principal problem not specified>    Interval History:     Medications:  Continuous Infusions:   sodium bicarbonate 150 mEq in D5W 1,000 mL infusion   Intravenous Continuous 125 mL/hr at 06/23/24 1516 Rate Verify at 06/23/24 1516     Scheduled Meds:   atorvastatin  10 mg Oral Daily    cetirizine  5 mg Oral Daily    heparin (porcine)  5,000 Units Subcutaneous Q8H    [START ON 6/24/2024] methylPREDNISolone sodium succinate injection  2 mg/kg/day Intravenous Daily    potassium chloride  10 mEq Intravenous Q1H    QUEtiapine  25 mg Oral QHS    sodium chloride 0.9%  10 mL Intravenous Q6H    triamcinolone acetonide 0.1%   Topical (Top) BID     PRN Meds:  Current Facility-Administered Medications:     dextrose 10%, 12.5 g, Intravenous, PRN    dextrose 10%, 25 g, Intravenous, PRN    glucagon (human recombinant), 1 mg, Intramuscular, PRN    glucose, 16 g, Oral, PRN    glucose, 24 g, Oral, PRN    hydrOXYzine HCL, 25 mg, Oral, TID PRN    insulin aspart U-100, 0-5 Units, Subcutaneous, QID (AC + HS) PRN    naloxone, 0.02 mg, Intravenous, PRN    sodium chloride 0.9%, 10 mL, Intravenous, Q12H PRN    Flushing PICC/Midline Protocol, , , Until Discontinued **AND** sodium chloride 0.9%, 10 mL, Intravenous, Q6H **AND** sodium chloride 0.9%, 10 mL, Intravenous, PRN    Review of Systems  Objective:     Vital Signs (Most Recent):  Temp: 97 °F (36.1 °C) (06/23/24 1226)  Pulse: 72 (06/23/24 1443)  Resp: 18 (06/23/24 1226)  BP: (!) 132/91 (06/23/24 1226)  SpO2: 98 % (06/23/24 1226) Vital Signs (24h Range):  Temp:  [97 °F (36.1 °C)-97.5 °F (36.4 °C)] 97 °F (36.1 °C)  Pulse:  [] 72  Resp:  [18] 18  SpO2:  [98 %-99 %] 98 %  BP: (114-140)/(54-91) 132/91     Weight: 84.4 kg (186 lb)  Body mass index is 34.02 kg/m².     Physical Exam   Skin:     -Confluent erythematous plaques and satellite and scattered papules / small plaques; associated exfoliation at face, torso, flexures incl groin. Slightly less  erythematous today and exfoliation more prominent today. Possible facial edema per daughter compared to baseline. No target / targetoid lesions.   - Otherwise, no erosions, ulcerations, crusting at examined mucosa of eyes, nose, oropharynx.           Significant Labs: CBC:   Recent Labs   Lab 06/22/24  0608 06/22/24  0940 06/22/24  1719 06/23/24  0300   WBC 16.83*  --  24.06* 17.11*   HGB 11.6*  --  9.9* 9.7*   HCT 34.9* 32* 28.8* 27.4*     --  203 198     CMP:   Recent Labs   Lab 06/22/24  0608 06/22/24  1605 06/23/24  0300 06/23/24  1038    139 135* 139   K 3.3* 3.4* 2.6* 3.1*    108 102 105   CO2 9* 7* 13* 15*   * 128* 118* 115*   BUN 97* 97* 95* 93*   CREATININE 5.4* 5.1* 4.9* 4.9*   CALCIUM 7.8* 7.8* 7.3* 7.3*   PROT 6.0 5.9* 5.5*  --    ALBUMIN 1.8* 1.8* 1.8*  --    BILITOT 0.2 0.2 0.2  --    ALKPHOS 81 67 68  --    AST 14 18 19  --    ALT 5* 5* 5*  --    ANIONGAP 22* 24* 20* 19*

## 2024-06-23 NOTE — PROGRESS NOTES
Nephrology at bedside. Patient with minimal output in Yuen this shift - Na bicarb still infusing at 125cc/hr. Discussed concerns about patient becoming FVO - daughter states bilateral hands/arms more edematous today. Will continue to monitor.

## 2024-06-23 NOTE — PROGRESS NOTES
[facility] Medicine  Progress Note    Patient Name: Dahlia Spence  MRN: [id number]  Patient Class: IP- Inpatient   Admission Date: 6/21/2024  Length of Stay: 1 days  Attending Physician: Dwight Granados MD  Primary Care Provider: Frandy Roe MD        Subjective:     Principal Problem:<principal problem not specified>        HPI:  88 YOF w/CKD3, T2DM, COPD, PVD 2/2 to DM, HTN, HLD, LBP, OA who presented for a skin rash. The patient had a recent I&D (05/16/2024) for her R knee wound and started zosyn. Then 7 days ago she developed full body hives with rash and tongue swelling. Family reports AMS and is not at her baseline.     Recent history: right distal femur replacement on 04/29/2024, then I&D of RLE 5/16/24, then dehiscence of the right TKA and underwent a right gastroc flap, split thickness skin graft, and wound VAC on 05/23/2024. Then the patient had a post op infection and had a 10 day course of linezolid to daptomycin. Then she had a second post op infection treated with a 6 week course of zosyn on  05/30/2024 and was seen by ID.     In the ED: patient received fluid resuscitation and IV ceftriaxone. She exhibited signs of minimal skin desqaumation amidst normal vital signs.  WBC 20.56  HGB 12.6     K 3.0  CRT 5.0 (baseline 1.5)    Overview/Hospital Course:  Patient was admitted and dermatology consultation was sent along with ID, nephrology and orthopedics. An infected knee wound was found on her right side which was oozing pus, which is why ortho was consulted. In regards to antibiotic selection, ID is on board as patient has gone through an extensive antibiotic regimen the last 6 weeks and still appears to have an infected knee wound with AMS. AMS could be due to dehydration, JOSÉ, infection or a combination of them. Will continue to assess. On seroquel for the time being which she already takes at home. CT head ordered. CRT neetu to >5 from a baseline of  1.5 a few weeks ago. Sodium bicarb drip given by nephrology and potassium replacement initiated. Punch biopsy for possible DRESS syndrome done by derma team. Ortho deferred intervention of right knee wound to primary team.     Interval History: rash all over, mild improvement in the erythema.     Review of Systems  Objective:     Vital Signs (Most Recent):  Temp: 97 °F (36.1 °C) (06/23/24 1226)  Pulse: 67 (06/23/24 1226)  Resp: 18 (06/23/24 1226)  BP: (!) 132/91 (06/23/24 1226)  SpO2: 98 % (06/23/24 1226) Vital Signs (24h Range):  Temp:  [97 °F (36.1 °C)-97.5 °F (36.4 °C)] 97 °F (36.1 °C)  Pulse:  [] 67  Resp:  [18] 18  SpO2:  [98 %-99 %] 98 %  BP: (114-140)/(54-91) 132/91     Weight: 84.4 kg (186 lb)  Body mass index is 34.02 kg/m².    Intake/Output Summary (Last 24 hours) at 6/23/2024 1344  Last data filed at 6/23/2024 1242  Gross per 24 hour   Intake 3412.53 ml   Output 375 ml   Net 3037.53 ml         Physical Exam  HENT:      Head: Normocephalic.   Eyes:      Pupils: Pupils are equal, round, and reactive to light.   Cardiovascular:      Rate and Rhythm: Normal rate and regular rhythm.      Pulses: Normal pulses.      Heart sounds: Normal heart sounds.   Pulmonary:      Effort: Pulmonary effort is normal.      Breath sounds: Normal breath sounds.   Abdominal:      General: Bowel sounds are normal.      Palpations: Abdomen is soft.   Musculoskeletal:      Right lower leg: No edema.      Left lower leg: No edema.   Skin:     Capillary Refill: Capillary refill takes less than 2 seconds.      Findings: Erythema and rash present.   Neurological:      Mental Status: She is alert. She is disoriented.             Significant Labs: All pertinent labs within the past 24 hours have been reviewed.    Significant Imaging: I have reviewed all pertinent imaging results/findings within the past 24 hours.    Assessment/Plan:      Acidosis  Sodium bicarb drip initiated by nephrology  Nephro onboard.  Bicarbonate level went  from 8 to 15 on bicarb drip      Hypokalemia    Lab Results   Component Value Date    K 3.1 (L) 06/23/2024   . Will continue potassium replacement per protocol and recheck repeat levels after replacement completed.     Open wound of right knee s/p R distal femoral replacement, I&D, and gastrocnemius flap  Right knee wound (underwent knee replacement) is erythematous and draining pus. She has along history of wound infection that has kept her on chronic antibiotics in the past 2 months    Plan:  Ortho consultation        Hives  Possibly drug induced. No eosinophilla   Will continue to monitor  May start abx, pending ID recs    - ID  consult  - antihistamine, topical steroid  - derm consulted for drug reaction and topical corticosteroid recs  - triamcinolone daily         Type 2 diabetes mellitus with other circulatory complications  Low dose ssi as needed prn  HBA1C      Chronic obstructive pulmonary disease, unspecified  Supplemental oxygen  Duo-nebs  Target SPO2 >88%    Chronic kidney disease, stage III (moderate)  Patient's CRT today is 5.0 >>5.4  Baseline in the system is 1.5  Acidosis with ph 7.1  HCO3 is 9  Phos 9    Plan:  - Nephrology consult, now  - urine studies pending     Benign essential hypertension  Chronic, controlled. Latest blood pressure and vitals reviewed-     Temp:  [96.5 °F (35.8 °C)-98 °F (36.7 °C)]   Pulse:  [69-89]   Resp:  [16-20]   BP: (104-149)/()   SpO2:  [91 %-100 %] .   Home meds for hypertension were reviewed and noted below.   Hypertension Medications               lisinopriL-hydrochlorothiazide (PRINZIDE,ZESTORETIC) 20-12.5 mg per tablet TAKE 1 TABLET BY MOUTH EVERY DAY            While in the hospital, will manage blood pressure as follows; Adjust home antihypertensive regimen as follows- HOLD    Will utilize p.r.n. blood pressure medication only if patient's blood pressure greater than 180/110 and she develops symptoms such as worsening chest pain or shortness of  breath.      VTE Risk Mitigation (From admission, onward)           Ordered     heparin (porcine) injection 5,000 Units  Every 8 hours         06/21/24 1803     IP VTE HIGH RISK PATIENT  Once         06/21/24 1803     Place sequential compression device  Until discontinued         06/21/24 1803                    Discharge Planning   ACE: 6/26/2024     Code Status: DNR   Is the patient medically ready for discharge?:     Reason for patient still in hospital (select all that apply): Treatment                     Oliva Harp MD  Department of Hospital Medicine   [facility] - Transplant Stepdown

## 2024-06-23 NOTE — PROGRESS NOTES
Nurses Note -- 4 Eyes      6/23/2024   11:15 AM      Skin assessed during: Q Shift Change      [] No Altered Skin Integrity Present    []Prevention Measures Documented      [x] Yes- Altered Skin Integrity Present or Discovered   [] LDA Added if Not in Epic (Describe Wound)   [x] New Altered Skin Integrity was Present on Admit and Documented in LDA   [x] Wound Image Taken    Wound Care Consulted? Yes    Attending Nurse:  ALMAS Arnett RN     Second RN/Staff Member:  PATRICIA Colindres RN

## 2024-06-23 NOTE — ASSESSMENT & PLAN NOTE
Lab Results   Component Value Date    K 3.1 (L) 06/23/2024   . Will continue potassium replacement per protocol and recheck repeat levels after replacement completed.

## 2024-06-23 NOTE — PROGRESS NOTES
Patient transported off unit to US via stretcher. Bicarb gtt infusing via midline. Midline placed prior to patient leaving floor per PICC team - outstanding labs able to be drawn from line and sent to lab. Venipuncture with difficulty drawing labs this AM.

## 2024-06-23 NOTE — NURSING
Nurses Note -- 4 Eyes      6/23/2024   6:40 AM      Skin assessed during: Daily Assessment      [] No Altered Skin Integrity Present    []Prevention Measures Documented      [x] Yes- Altered Skin Integrity Present or Discovered    LDA Added if Not in Epic (Describe Wound)   [x][x] New Altered Skin Integrity was Present on Admit and Documented in LDA   [x] Wound Image Taken    Wound Care Consulted? Yes    Attending Nurse:  Vanessa Gomes RN/Staff Member:  Darshan PILLAI RN

## 2024-06-23 NOTE — CONSULTS
NIAS consulted for Midline insertion in real time using ultrasound guidance.    Patient cleared by nephrology for line placement.    Indication: PVA  Gauge: 18  Location: Left basilic vein  Length (cm): 10  Max dwell date: 7/22/24  Lot #: [id number]    Image saved and uploaded to EMR.

## 2024-06-23 NOTE — SUBJECTIVE & OBJECTIVE
Interval History: Afebrile. WBC downtrending. Daughter feels her mom is more swollen today. Discussed with primary team concerns for DRESS. Plan to start steroids. Awaiting medication list from nursing facility.    Review of Systems   Constitutional:  Positive for activity change and appetite change. Negative for chills, diaphoresis and fever.   HENT:  Positive for facial swelling.         Tongue swelling   Respiratory:  Negative for cough and shortness of breath.    Cardiovascular:  Negative for chest pain.   Gastrointestinal:  Negative for abdominal pain, diarrhea, nausea and vomiting.   Genitourinary:  Positive for decreased urine volume. Negative for difficulty urinating and dysuria.   Musculoskeletal:  Positive for arthralgias and joint swelling. Negative for back pain.   Skin:  Positive for color change, rash and wound.   Neurological:  Negative for headaches.   Psychiatric/Behavioral:  Positive for decreased concentration. Negative for agitation and confusion. The patient is not nervous/anxious.      Objective:     Vital Signs (Most Recent):  Temp: 97 °F (36.1 °C) (06/23/24 1226)  Pulse: 67 (06/23/24 1226)  Resp: 18 (06/23/24 1226)  BP: (!) 132/91 (06/23/24 1226)  SpO2: 98 % (06/23/24 1226) Vital Signs (24h Range):  Temp:  [97 °F (36.1 °C)-97.5 °F (36.4 °C)] 97 °F (36.1 °C)  Pulse:  [] 67  Resp:  [18] 18  SpO2:  [98 %-99 %] 98 %  BP: (114-140)/(54-91) 132/91     Weight: 84.4 kg (186 lb)  Body mass index is 34.02 kg/m².    Estimated Creatinine Clearance: 8.1 mL/min (A) (based on SCr of 4.9 mg/dL (H)).     Physical Exam  Vitals and nursing note reviewed.   Constitutional:       General: She is not in acute distress.     Appearance: Normal appearance. She is well-developed. She is obese. She is ill-appearing.   HENT:      Head: Normocephalic and atraumatic.      Right Ear: External ear normal.      Left Ear: External ear normal.      Nose: Nose normal.   Eyes:      General: No scleral icterus.        Right  eye: No discharge.         Left eye: No discharge.      Extraocular Movements: Extraocular movements intact.      Conjunctiva/sclera: Conjunctivae normal.   Cardiovascular:      Rate and Rhythm: Normal rate.   Pulmonary:      Effort: Pulmonary effort is normal. No respiratory distress.      Breath sounds: No stridor.   Abdominal:      General: There is no distension.      Palpations: Abdomen is soft.      Tenderness: There is no abdominal tenderness.   Musculoskeletal:         General: Tenderness and signs of injury present. Normal range of motion.   Skin:     General: Skin is warm and dry.      Findings: Erythema and rash present.      Comments: Diffuse erythematous rash with skin peeling/desquamation present. See photos below  Necrotic fibrinous knee graft   Neurological:      Mental Status: She is alert. Mental status is at baseline.      Cranial Nerves: No cranial nerve deficit.      Comments: Alert but appears lethargic and not fully oriented   Psychiatric:         Mood and Affect: Mood normal.                                                              Significant Labs: CBC:   Recent Labs   Lab 06/22/24  0608 06/22/24  0940 06/22/24  1719 06/23/24  0300   WBC 16.83*  --  24.06* 17.11*   HGB 11.6*  --  9.9* 9.7*   HCT 34.9* 32* 28.8* 27.4*     --  203 198     CMP:   Recent Labs   Lab 06/22/24  0608 06/22/24  1605 06/23/24  0300 06/23/24  1038    139 135* 139   K 3.3* 3.4* 2.6* 3.1*    108 102 105   CO2 9* 7* 13* 15*   * 128* 118* 115*   BUN 97* 97* 95* 93*   CREATININE 5.4* 5.1* 4.9* 4.9*   CALCIUM 7.8* 7.8* 7.3* 7.3*   PROT 6.0 5.9* 5.5*  --    ALBUMIN 1.8* 1.8* 1.8*  --    BILITOT 0.2 0.2 0.2  --    ALKPHOS 81 67 68  --    AST 14 18 19  --    ALT 5* 5* 5*  --    ANIONGAP 22* 24* 20* 19*     Microbiology Results (last 7 days)       Procedure Component Value Units Date/Time    Blood culture #1 **CANNOT BE ORDERED STAT** [[id number]] Collected: 06/21/24 1443    Order Status: Completed  Specimen: Blood from Peripheral, Antecubital, Right Updated: 06/22/24 1812     Blood Culture, Routine No Growth to date      No Growth to date    Blood culture #2 **CANNOT BE ORDERED STAT** [[id number]] Collected: 06/21/24 1443    Order Status: Completed Specimen: Blood from Peripheral, Antecubital, Right Updated: 06/22/24 1812     Blood Culture, Routine No Growth to date      No Growth to date          Recent Lab Results  (Last 5 results in the past 24 hours)        06/23/24  1238   06/23/24  1038   06/23/24  0845   06/23/24  0300   06/22/24  2207        Albumin       1.8         ALP       68         ALT       5         Anion Gap   19     20         Aniso       Slight         AST       19         Bands       9.0         Baso #       CANCELED  Comment: Result canceled by the ancillary.         Basophilic Stippling       Occasional         Basophil %       1.0         BILIRUBIN TOTAL       0.2  Comment: For infants and newborns, interpretation of results should be based  on gestational age, weight and in agreement with clinical  observations.    Premature Infant recommended reference ranges:  Up to 24 hours.............<8.0 mg/dL  Up to 48 hours............<12.0 mg/dL  3-5 days..................<15.0 mg/dL  6-29 days.................<15.0 mg/dL           BUN   93     95         [place]/Echinocytes       Occasional         Calcium   7.3     7.3         Chloride   105     102         CO2   15     13         Creatinine   4.9     4.9         Urine Creatinine         98.0       Differential Method       Manual         Dohle Bodies       Present         eGFR   8.1     8.1         Eos #       CANCELED  Comment: Result canceled by the ancillary.         Eos %       1.0         Giant Platelets       Present         Glucose   115     118         Gran %       71.0         Hematocrit       27.4         Hemoglobin       9.7         Immature Grans (Abs)       CANCELED  Comment: Mild elevation in immature granulocytes is non specific  and   can be seen in a variety of conditions including stress response,   acute inflammation, trauma and pregnancy. Correlation with other   laboratory and clinical findings is essential.    Result canceled by the ancillary.           Immature Granulocytes       CANCELED  Comment: Result canceled by the ancillary.         Lymph #       CANCELED  Comment: Result canceled by the ancillary.         Lymph %       8.0         Magnesium        1.8         MCH       31.9         MCHC       35.4         MCV       90         Metamyelocytes       2.0         Mono #       CANCELED  Comment: Result canceled by the ancillary.         Mono %       5.0         MPV       10.6         Myelocytes       3.0         nRBC       1         Ovalocytes       Occasional         Phosphorus Level       8.1         Platelet Estimate       Clumped         Platelet Count       198  Comment: @PLCL  [C]         POCT Glucose 160     127           Poikilocytosis       Slight         Poly       Occasional         Potassium   3.1     2.6  Comment: *Critical value notification by pod with confirmation of receipt to   Vanessa Cervantes RN at  Date 6/23/24 Time 3:50pm           Urine Protein/Creatinine Ratio         0.70       PROTEIN TOTAL       5.5         Urine Protein         69       RBC       3.04         RDW       21.1         Smudge Cells       Present  Comment: Smudge cells present;Substantial numbers may affect the   accuracy of the differential.           Sodium   139     135         Spherocytes       Occasional         Toxic Granulation       Present         WBC       17.11                                 [C] - Corrected Result               Significant Imaging:     Imaging Results    None

## 2024-06-23 NOTE — PLAN OF CARE
Pt is confused and not oriented. 2 person assist. Afebrile. RA. Tele, NS to ST. Accu check, Ac&hs. .  No SSI, needed. Pt is incont to bowel. 1 BM this shift. Yuen in place CDI, draining cloudy yellow urine. Rt femur fx, brace in place. Wound to rt anterior leg, triamcinolone acetonide 0.1% cream applied then wet gauze and Kerlix. Skin breakdown to buttocks, upper posterior thigh, and vaginal region skin barrier cream applied. Hives all over from previous allergic reaction. PRN atarax given. Na Bicarb infusing at 125mL/hr. CO2 7. K+ 3.4. K+ rider 3 out of 4 given. Critical lab K+ 2.7. Team aware and PO 40 mEq K+ supplements ordered. Pt did not want to drink PO K+ supplements. Bed locked in the lowest position, bed alarm on, and call light within reach.

## 2024-06-23 NOTE — SUBJECTIVE & OBJECTIVE
Interval History: rash all over, mild improvement in the erythema.     Review of Systems  Objective:     Vital Signs (Most Recent):  Temp: 97 °F (36.1 °C) (06/23/24 1226)  Pulse: 67 (06/23/24 1226)  Resp: 18 (06/23/24 1226)  BP: (!) 132/91 (06/23/24 1226)  SpO2: 98 % (06/23/24 1226) Vital Signs (24h Range):  Temp:  [97 °F (36.1 °C)-97.5 °F (36.4 °C)] 97 °F (36.1 °C)  Pulse:  [] 67  Resp:  [18] 18  SpO2:  [98 %-99 %] 98 %  BP: (114-140)/(54-91) 132/91     Weight: 84.4 kg (186 lb)  Body mass index is 34.02 kg/m².    Intake/Output Summary (Last 24 hours) at 6/23/2024 1344  Last data filed at 6/23/2024 1242  Gross per 24 hour   Intake 3412.53 ml   Output 375 ml   Net 3037.53 ml         Physical Exam  HENT:      Head: Normocephalic.   Eyes:      Pupils: Pupils are equal, round, and reactive to light.   Cardiovascular:      Rate and Rhythm: Normal rate and regular rhythm.      Pulses: Normal pulses.      Heart sounds: Normal heart sounds.   Pulmonary:      Effort: Pulmonary effort is normal.      Breath sounds: Normal breath sounds.   Abdominal:      General: Bowel sounds are normal.      Palpations: Abdomen is soft.   Musculoskeletal:      Right lower leg: No edema.      Left lower leg: No edema.   Skin:     Capillary Refill: Capillary refill takes less than 2 seconds.      Findings: Erythema and rash present.   Neurological:      Mental Status: She is alert. She is disoriented.             Significant Labs: All pertinent labs within the past 24 hours have been reviewed.    Significant Imaging: I have reviewed all pertinent imaging results/findings within the past 24 hours.

## 2024-06-23 NOTE — ASSESSMENT & PLAN NOTE
Sodium bicarb drip initiated by nephrology  Nephro onboard.  Bicarbonate level went from 8 to 15 on bicarb drip

## 2024-06-23 NOTE — ASSESSMENT & PLAN NOTE
88-year-old female with history of right TKA  c/b distal femur periprosthetic fracture secondary to a fall s/p right knee revision of TKA with distal femoral replacement on 4/29 (completed empiric Linezolid > Daptomycin x 10 days through 5/11). Course complicated by right knee wound dehiscence s/p surgical I&D 5/16 followed by muscle flap/skin graft placement on 5/23. Cultures 5/16 grew Proteus and Pseudomonas. There were concerns for possible hardware involvement (Op note with dehiscence of inferior aspect of the capsular repair). Patient was discharged on six weeks of IV Zosyn from I&D (EOC 6/27). She followed up with ID on 6/6 and knee graft noted to be necrotic with slough present.  Wound cultures grew CONS and alpha hemalytic strep. It is unclear if patient was started on a new antibiotic since this time.    Patient presents with diffuse erythematous rash and tongue/facial swelling concerning for drug eruption and potentially DRESS. ID consulted for recommendations.    Recommendations  Discontinue and hold all antibiotics as antibiotics are known to potentially cause severe allergic reactions/drug eruptions   Case management is attempting to obtain records of medication list from patient's nursing facility to assess if she has been on any new medications/antibiotics other than Zosyn  Dermatology is following. Ok to start systemic steroids from an ID standpoint.   Orthopedic surgery and Nephrology are following.   Discussed plan with ID staff. ID will follow closely.

## 2024-06-24 PROBLEM — T50.905A DRESS SYNDROME: Status: ACTIVE | Noted: 2024-06-21

## 2024-06-24 PROBLEM — D72.12 DRESS SYNDROME: Status: ACTIVE | Noted: 2024-06-21

## 2024-06-24 LAB
ALBUMIN SERPL BCP-MCNC: 1.7 G/DL (ref 3.5–5.2)
ALBUMIN SERPL BCP-MCNC: 1.8 G/DL (ref 3.5–5.2)
ALP SERPL-CCNC: 67 U/L (ref 55–135)
ALT SERPL W/O P-5'-P-CCNC: 6 U/L (ref 10–44)
ANION GAP SERPL CALC-SCNC: 18 MMOL/L (ref 8–16)
ANION GAP SERPL CALC-SCNC: 19 MMOL/L (ref 8–16)
ANION GAP SERPL CALC-SCNC: 19 MMOL/L (ref 8–16)
ANISOCYTOSIS BLD QL SMEAR: SLIGHT
AST SERPL-CCNC: 16 U/L (ref 10–40)
BASO STIPL BLD QL SMEAR: ABNORMAL
BASOPHILS NFR BLD: 1 % (ref 0–1.9)
BILIRUB SERPL-MCNC: 0.2 MG/DL (ref 0.1–1)
BUN SERPL-MCNC: 84 MG/DL (ref 8–23)
BUN SERPL-MCNC: 85 MG/DL (ref 8–23)
BUN SERPL-MCNC: 91 MG/DL (ref 8–23)
CALCIUM SERPL-MCNC: 7 MG/DL (ref 8.7–10.5)
CALCIUM SERPL-MCNC: 7.1 MG/DL (ref 8.7–10.5)
CALCIUM SERPL-MCNC: 7.2 MG/DL (ref 8.7–10.5)
CHLORIDE SERPL-SCNC: 100 MMOL/L (ref 95–110)
CHLORIDE SERPL-SCNC: 100 MMOL/L (ref 95–110)
CHLORIDE SERPL-SCNC: 98 MMOL/L (ref 95–110)
CK SERPL-CCNC: 42 U/L (ref 20–180)
CO2 SERPL-SCNC: 20 MMOL/L (ref 23–29)
CO2 SERPL-SCNC: 22 MMOL/L (ref 23–29)
CO2 SERPL-SCNC: 23 MMOL/L (ref 23–29)
CREAT SERPL-MCNC: 4 MG/DL (ref 0.5–1.4)
CREAT SERPL-MCNC: 4.3 MG/DL (ref 0.5–1.4)
CREAT SERPL-MCNC: 4.6 MG/DL (ref 0.5–1.4)
DIFFERENTIAL METHOD BLD: ABNORMAL
EOSINOPHIL NFR BLD: 1 % (ref 0–8)
ERYTHROCYTE [DISTWIDTH] IN BLOOD BY AUTOMATED COUNT: 21.2 % (ref 11.5–14.5)
EST. GFR  (NO RACE VARIABLE): 10.3 ML/MIN/1.73 M^2
EST. GFR  (NO RACE VARIABLE): 8.7 ML/MIN/1.73 M^2
EST. GFR  (NO RACE VARIABLE): 9.5 ML/MIN/1.73 M^2
FINAL PATHOLOGIC DIAGNOSIS: NORMAL
GLUCOSE SERPL-MCNC: 137 MG/DL (ref 70–110)
GLUCOSE SERPL-MCNC: 153 MG/DL (ref 70–110)
GLUCOSE SERPL-MCNC: 167 MG/DL (ref 70–110)
GROSS: NORMAL
HCT VFR BLD AUTO: 25.2 % (ref 37–48.5)
HGB BLD-MCNC: 9 G/DL (ref 12–16)
IGA SERPL-MCNC: 399 MG/DL (ref 40–350)
IMM GRANULOCYTES # BLD AUTO: ABNORMAL K/UL (ref 0–0.04)
IMM GRANULOCYTES NFR BLD AUTO: ABNORMAL % (ref 0–0.5)
LYMPHOCYTES NFR BLD: 2 % (ref 18–48)
Lab: NORMAL
MAGNESIUM SERPL-MCNC: 1.6 MG/DL (ref 1.6–2.6)
MCH RBC QN AUTO: 32 PG (ref 27–31)
MCHC RBC AUTO-ENTMCNC: 35.7 G/DL (ref 32–36)
MCV RBC AUTO: 90 FL (ref 82–98)
METAMYELOCYTES NFR BLD MANUAL: 3 %
MICROSCOPIC EXAM: NORMAL
MONOCYTES NFR BLD: 2 % (ref 4–15)
MYELOCYTES NFR BLD MANUAL: 2 %
NEUTROPHILS NFR BLD: 88 % (ref 38–73)
NEUTS BAND NFR BLD MANUAL: 1 %
NRBC BLD-RTO: 1 /100 WBC
OVALOCYTES BLD QL SMEAR: ABNORMAL
PATH REV BLD -IMP: NORMAL
PHOSPHATE SERPL-MCNC: 6.2 MG/DL (ref 2.7–4.5)
PHOSPHATE SERPL-MCNC: 6.7 MG/DL (ref 2.7–4.5)
PLATELET # BLD AUTO: 181 K/UL (ref 150–450)
PLATELET BLD QL SMEAR: ABNORMAL
PMV BLD AUTO: 11.7 FL (ref 9.2–12.9)
POCT GLUCOSE: 139 MG/DL (ref 70–110)
POCT GLUCOSE: 202 MG/DL (ref 70–110)
POCT GLUCOSE: 203 MG/DL (ref 70–110)
POIKILOCYTOSIS BLD QL SMEAR: SLIGHT
POLYCHROMASIA BLD QL SMEAR: ABNORMAL
POTASSIUM SERPL-SCNC: 3.3 MMOL/L (ref 3.5–5.1)
POTASSIUM SERPL-SCNC: 3.3 MMOL/L (ref 3.5–5.1)
POTASSIUM SERPL-SCNC: 3.7 MMOL/L (ref 3.5–5.1)
PROT SERPL-MCNC: 5.2 G/DL (ref 6–8.4)
RBC # BLD AUTO: 2.81 M/UL (ref 4–5.4)
SCHISTOCYTES BLD QL SMEAR: ABNORMAL
SCHISTOCYTES BLD QL SMEAR: PRESENT
SODIUM SERPL-SCNC: 139 MMOL/L (ref 136–145)
SODIUM SERPL-SCNC: 140 MMOL/L (ref 136–145)
SODIUM SERPL-SCNC: 140 MMOL/L (ref 136–145)
SPHEROCYTES BLD QL SMEAR: ABNORMAL
WBC # BLD AUTO: 11.16 K/UL (ref 3.9–12.7)

## 2024-06-24 PROCEDURE — 80069 RENAL FUNCTION PANEL: CPT

## 2024-06-24 PROCEDURE — 83516 IMMUNOASSAY NONANTIBODY: CPT | Performed by: HOSPITALIST

## 2024-06-24 PROCEDURE — 82784 ASSAY IGA/IGD/IGG/IGM EACH: CPT | Performed by: HOSPITALIST

## 2024-06-24 PROCEDURE — 63600175 PHARM REV CODE 636 W HCPCS: Performed by: INTERNAL MEDICINE

## 2024-06-24 PROCEDURE — 80048 BASIC METABOLIC PNL TOTAL CA: CPT | Performed by: INTERNAL MEDICINE

## 2024-06-24 PROCEDURE — 82550 ASSAY OF CK (CPK): CPT | Performed by: HOSPITALIST

## 2024-06-24 PROCEDURE — 99233 SBSQ HOSP IP/OBS HIGH 50: CPT | Mod: ,,, | Performed by: INTERNAL MEDICINE

## 2024-06-24 PROCEDURE — 99223 1ST HOSP IP/OBS HIGH 75: CPT | Mod: ,,, | Performed by: INTERNAL MEDICINE

## 2024-06-24 PROCEDURE — 63600175 PHARM REV CODE 636 W HCPCS

## 2024-06-24 PROCEDURE — 85007 BL SMEAR W/DIFF WBC COUNT: CPT

## 2024-06-24 PROCEDURE — 84100 ASSAY OF PHOSPHORUS: CPT

## 2024-06-24 PROCEDURE — 25000003 PHARM REV CODE 250

## 2024-06-24 PROCEDURE — 83735 ASSAY OF MAGNESIUM: CPT

## 2024-06-24 PROCEDURE — 25000003 PHARM REV CODE 250: Performed by: INTERNAL MEDICINE

## 2024-06-24 PROCEDURE — 85027 COMPLETE CBC AUTOMATED: CPT

## 2024-06-24 PROCEDURE — 83516 IMMUNOASSAY NONANTIBODY: CPT | Mod: 59 | Performed by: HOSPITALIST

## 2024-06-24 PROCEDURE — 20600001 HC STEP DOWN PRIVATE ROOM

## 2024-06-24 PROCEDURE — 80053 COMPREHEN METABOLIC PANEL: CPT

## 2024-06-24 PROCEDURE — A4216 STERILE WATER/SALINE, 10 ML: HCPCS | Performed by: INTERNAL MEDICINE

## 2024-06-24 RX ORDER — METHYLPREDNISOLONE SOD SUCC 125 MG
80 VIAL (EA) INJECTION EVERY 8 HOURS
Status: DISCONTINUED | OUTPATIENT
Start: 2024-06-24 | End: 2024-06-26

## 2024-06-24 RX ORDER — SODIUM CHLORIDE, SODIUM LACTATE, POTASSIUM CHLORIDE, CALCIUM CHLORIDE 600; 310; 30; 20 MG/100ML; MG/100ML; MG/100ML; MG/100ML
INJECTION, SOLUTION INTRAVENOUS CONTINUOUS
Status: DISCONTINUED | OUTPATIENT
Start: 2024-06-24 | End: 2024-06-29

## 2024-06-24 RX ORDER — POTASSIUM CHLORIDE 7.45 MG/ML
10 INJECTION INTRAVENOUS
Status: COMPLETED | OUTPATIENT
Start: 2024-06-24 | End: 2024-06-24

## 2024-06-24 RX ADMIN — INSULIN ASPART 2 UNITS: 100 INJECTION, SOLUTION INTRAVENOUS; SUBCUTANEOUS at 08:06

## 2024-06-24 RX ADMIN — POTASSIUM CHLORIDE 10 MEQ: 7.46 INJECTION, SOLUTION INTRAVENOUS at 04:06

## 2024-06-24 RX ADMIN — Medication 10 ML: at 05:06

## 2024-06-24 RX ADMIN — SODIUM BICARBONATE: 84 INJECTION, SOLUTION INTRAVENOUS at 01:06

## 2024-06-24 RX ADMIN — PREDNISONE 84 MG: 1 TABLET ORAL at 08:06

## 2024-06-24 RX ADMIN — SODIUM CHLORIDE, POTASSIUM CHLORIDE, SODIUM LACTATE AND CALCIUM CHLORIDE: 600; 310; 30; 20 INJECTION, SOLUTION INTRAVENOUS at 12:06

## 2024-06-24 RX ADMIN — HEPARIN SODIUM 5000 UNITS: 5000 INJECTION INTRAVENOUS; SUBCUTANEOUS at 05:06

## 2024-06-24 RX ADMIN — SODIUM CHLORIDE, POTASSIUM CHLORIDE, SODIUM LACTATE AND CALCIUM CHLORIDE: 600; 310; 30; 20 INJECTION, SOLUTION INTRAVENOUS at 08:06

## 2024-06-24 RX ADMIN — CETIRIZINE HYDROCHLORIDE 5 MG: 5 TABLET, FILM COATED ORAL at 08:06

## 2024-06-24 RX ADMIN — POTASSIUM CHLORIDE 10 MEQ: 7.46 INJECTION, SOLUTION INTRAVENOUS at 01:06

## 2024-06-24 RX ADMIN — TRIAMCINOLONE ACETONIDE: 1 CREAM TOPICAL at 08:06

## 2024-06-24 RX ADMIN — POTASSIUM CHLORIDE 10 MEQ: 7.46 INJECTION, SOLUTION INTRAVENOUS at 12:06

## 2024-06-24 RX ADMIN — HEPARIN SODIUM 5000 UNITS: 5000 INJECTION INTRAVENOUS; SUBCUTANEOUS at 08:06

## 2024-06-24 RX ADMIN — METHYLPREDNISOLONE SODIUM SUCCINATE 80 MG: 125 INJECTION, POWDER, FOR SOLUTION INTRAMUSCULAR; INTRAVENOUS at 08:06

## 2024-06-24 RX ADMIN — Medication 10 ML: at 12:06

## 2024-06-24 RX ADMIN — HEPARIN SODIUM 5000 UNITS: 5000 INJECTION INTRAVENOUS; SUBCUTANEOUS at 03:06

## 2024-06-24 RX ADMIN — SODIUM BICARBONATE: 84 INJECTION, SOLUTION INTRAVENOUS at 09:06

## 2024-06-24 RX ADMIN — INSULIN ASPART 2 UNITS: 100 INJECTION, SOLUTION INTRAVENOUS; SUBCUTANEOUS at 12:06

## 2024-06-24 RX ADMIN — TRIAMCINOLONE ACETONIDE: 1 CREAM TOPICAL at 12:06

## 2024-06-24 RX ADMIN — QUETIAPINE FUMARATE 25 MG: 25 TABLET ORAL at 08:06

## 2024-06-24 RX ADMIN — POTASSIUM CHLORIDE 10 MEQ: 7.46 INJECTION, SOLUTION INTRAVENOUS at 03:06

## 2024-06-24 RX ADMIN — ATORVASTATIN CALCIUM 10 MG: 10 TABLET, FILM COATED ORAL at 08:06

## 2024-06-24 RX ADMIN — METHYLPREDNISOLONE SODIUM SUCCINATE 80 MG: 125 INJECTION, POWDER, FOR SOLUTION INTRAMUSCULAR; INTRAVENOUS at 03:06

## 2024-06-24 NOTE — ASSESSMENT & PLAN NOTE
Urine microscopy showed course granular cast consistent with ATN. The sample was heavily populated with squamous cells, making identification of WBCs or RBCs difficult however urine dipstick done in our office today did not reveal any WBCs or RBCs which contrast the urine dipstick preformed on arrival. Due to overpopulation of squamous cells in the urine sample, AIN cannot be fully excluded at this time.     Recommendations  -Renal functions are improving, no need of RRT at this time.  -Avoid nephrotoxic agents (IV contrast, NSAID's, gadolinium contrast, PPI's) if able.   -Maintain MAP above 65 mmHg.   -Strict I's and O's  -Daily renal function panel  -Renally dose all medications

## 2024-06-24 NOTE — PROGRESS NOTES
[facility]  Progress Note    Patient Name: Dahlia Spence  MRN: [id number]  Patient Class: IP- Inpatient   Admission Date: 6/21/2024  Length of Stay: 2 days  Attending Physician: Dwight Granados MD  Primary Care Provider: Frandy Roe MD        Subjective:     Principal Problem:<principal problem not specified>        HPI:  88 YOF w/CKD3, T2DM, COPD, PVD 2/2 to DM, HTN, HLD, LBP, OA who presented for a skin rash. The patient had a recent I&D (05/16/2024) for her R knee wound and started zosyn. Then 7 days ago she developed full body hives with rash and tongue swelling. Family reports AMS and is not at her baseline.     Recent history: right distal femur replacement on 04/29/2024, then I&D of RLE 5/16/24, then dehiscence of the right TKA and underwent a right gastroc flap, split thickness skin graft, and wound VAC on 05/23/2024. Then the patient had a post op infection and had a 10 day course of linezolid to daptomycin. Then she had a second post op infection treated with a 6 week course of zosyn on  05/30/2024 and was seen by ID.     In the ED: patient received fluid resuscitation and IV ceftriaxone. She exhibited signs of minimal skin desqaumation amidst normal vital signs.  WBC 20.56  HGB 12.6     K 3.0  CRT 5.0 (baseline 1.5)    Overview/Hospital Course:  88 YOF w/CKD3, T2DM, COPD, PVD 2/2 to DM, HTN, HLD, LBP, OA who presented for a skin rash.Pt had a recent TKA, fall after OR and femur fracture, requiring ORIF followed by wound infection and complex healing course. Bone cx alpha hemolytic strep. Required ABX therapy guided by ID c/o Linezolid followed by recent initiation of Zosyn therapy. Developed marked drug eruption over the course of the last week. Presents with severe total body eruption, AMS and profound JOSÉ. Wound care, ID, ortho, derm, and nephro following. Concern for DRESS clinically, started on IV and topical steroids. DRESS confirmed on  pathology. Per ortho the patient is too sick for acute interventions, continue wound care only. ID recommends to continue holding antibiotics in the setting of DRESS. Developed an JOSÉ this admission which is likely 2/2 to prerenal, started on HCO3 gtt and transitioned to continuous LR. Started nightly seroquel for AMS 2/2 possibly to delirium from acute illness (neg CTH).       Interval History: improving skin condition    Review of Systems   Unable to perform ROS: Mental status change     Objective:     Vital Signs (Most Recent):  Temp: 97.5 °F (36.4 °C) (06/24/24 1706)  Pulse: 76 (06/24/24 1706)  Resp: 16 (06/24/24 1706)  BP: (!) 128/58 (06/24/24 1706)  SpO2: 97 % (06/24/24 1706) Vital Signs (24h Range):  Temp:  [97 °F (36.1 °C)-97.5 °F (36.4 °C)] 97.5 °F (36.4 °C)  Pulse:  [56-87] 76  Resp:  [16-19] 16  SpO2:  [95 %-99 %] 97 %  BP: (111-168)/(58-87) 128/58     Weight: 84.4 kg (186 lb)  Body mass index is 34.02 kg/m².    Intake/Output Summary (Last 24 hours) at 6/24/2024 1745  Last data filed at 6/24/2024 1730  Gross per 24 hour   Intake 3751.87 ml   Output 670 ml   Net 3081.87 ml         Physical Exam  HENT:      Head: Normocephalic.   Eyes:      Pupils: Pupils are equal, round, and reactive to light.   Cardiovascular:      Rate and Rhythm: Normal rate and regular rhythm.      Pulses: Normal pulses.      Heart sounds: Normal heart sounds.   Pulmonary:      Effort: Pulmonary effort is normal.      Breath sounds: Normal breath sounds.   Abdominal:      General: Bowel sounds are normal.      Palpations: Abdomen is soft.   Musculoskeletal:      Right lower leg: No edema.      Left lower leg: No edema.   Skin:     Capillary Refill: Capillary refill takes less than 2 seconds.      Findings: Erythema and rash present.   Neurological:      Mental Status: She is alert. She is disoriented.             Significant Labs: All pertinent labs within the past 24 hours have been reviewed.    Significant Imaging: I have reviewed  all pertinent imaging results/findings within the past 24 hours.    Assessment/Plan:      Acidosis  HAGMA 2/2 to pre-renal JOSÉ    - was on HCO3 gtt, transitioned to LR   - CMP daily      Hypokalemia    Lab Results   Component Value Date    K 3.7 06/24/2024   . Will continue potassium replacement per protocol and recheck repeat levels after replacement completed.     Metabolic acidosis, increased anion gap  See acidosis      JOSÉ (acute kidney injury)  Patient with acute kidney injury/acute renal failure likely due to pre-renal azotemia due to dehydration JOSÉ is currently improving. Baseline creatinine  1.5  - Labs reviewed- Renal function/electrolytes with Estimated Creatinine Clearance: 9.3 mL/min (A) (based on SCr of 4.3 mg/dL (H)). according to latest data. Monitor urine output and serial BMP and adjust therapy as needed. Avoid nephrotoxins and renally dose meds for GFR listed above.    Open wound of right knee s/p R distal femoral replacement, I&D, and gastrocnemius flap  .Pt had a recent TKA, fall after OR and femur fracture, requiring ORIF followed by wound infection and complex healing course. Bone cx alpha hemolytic strep. Required ABX therapy guided by ID c/o Linezolid followed by recent initiation of Zosyn therapy. No off zosyn.     Plan:  Ortho consultation; recs appreciated        DRESS syndrome  Concern for DRESS clinically, started on IV and topical steroids. DRESS confirmed on pathologyID recommends to continue holding antibiotics in the setting of DRESS.     - antihistamine, topical steroid  - IV methylpred, taper to be determined based off the following labs (each disease state affects treatement course) - TSH, HHV6, CMV, EMV, and HHV7 unable to be ordered   - derm consulted for drug reaction and topical corticosteroid recs  - triamcinolone daily         Type 2 diabetes mellitus with other circulatory complications  Low dose ssi as needed prn  HBA1C      Chronic obstructive pulmonary disease,  unspecified  Supplemental oxygen  Duo-nebs  Target SPO2 >88%    Chronic kidney disease, stage III (moderate)  Patient's CRT today is 5.0 >>5.4  Baseline in the system is 1.5  Acidosis with ph 7.1  HCO3 is 9  Phos 9    Plan:  - Nephrology consult, now  - urine studies pending     Benign essential hypertension  Chronic, controlled. Latest blood pressure and vitals reviewed-     Temp:  [97 °F (36.1 °C)-97.5 °F (36.4 °C)]   Pulse:  [56-87]   Resp:  [16-19]   BP: (111-168)/(58-87)   SpO2:  [95 %-99 %] .   Home meds for hypertension were reviewed and noted below.   Hypertension Medications               lisinopriL-hydrochlorothiazide (PRINZIDE,ZESTORETIC) 20-12.5 mg per tablet TAKE 1 TABLET BY MOUTH EVERY DAY            While in the hospital, will manage blood pressure as follows; Adjust home antihypertensive regimen as follows- HOLD    Will utilize p.r.n. blood pressure medication only if patient's blood pressure greater than 180/110 and she develops symptoms such as worsening chest pain or shortness of breath.      VTE Risk Mitigation (From admission, onward)           Ordered     heparin (porcine) injection 5,000 Units  Every 8 hours         06/21/24 1803     IP VTE HIGH RISK PATIENT  Once         06/21/24 1803     Place sequential compression device  Until discontinued         06/21/24 1803                    Discharge Planning   ACE: 6/26/2024     Code Status: DNR   Is the patient medically ready for discharge?:     Reason for patient still in hospital (select all that apply): Patient trending condition and Treatment  Discharge Plan A: Skilled Nursing Facility                  Kimmie Hoyt DO  Department of Hospital Medicine   [facility]

## 2024-06-24 NOTE — ASSESSMENT & PLAN NOTE
Lab Results   Component Value Date    K 3.7 06/24/2024   . Will continue potassium replacement per protocol and recheck repeat levels after replacement completed.

## 2024-06-24 NOTE — PLAN OF CARE
Today the patient looks less erythematous compared to initial evaluation.      #Rash, favor drug eruption with concern for severe cutaneous adverse reaction, notably DRESS  88yoF w/ hx of CKD3, T2DM, COPD, PVD 2/2 to DM, HTN, HLD, OA who presented for acute widespread exfoliative dermatitis. The patient has been exposed to numerous antibiotics in the past several weeks including linezolid, daptomycin and, Zosyn for 3 weeks.   Biopsy came back consistent with drug eruption and with clinical picture favoring DRESS with pruritic eruption, facial edema, and possible renal involvement.     Recommendations:   - Recommend ordering PCR for HHV6, HHV7, EBV, and CMV. Recommend ordering TSH.   -Continue Triamcinolone BID PRN to rash; consider wet wraps for torso, non-infected extremities to jumpstart improvement. See below for protocol.   -Continue IV solumedrol 80mg while in the hospital. Dress necessitates gradual taper to prevent rebound.    -Continue supportive care.      Wet wrap therapy:  - Apply thin layer of steroid cream to all affected areas on body.   - Cover with warm, damp cotton Pjs (or wrap with warm, damp towels) first then second layer of dry cotton Pjs (or wrap with large dry towel or blanket).  - Remove after 30 minutes to an hour.   - Re-apply thin layer of steroid cream to all affected areas on body.        PE with multiple erythematous plaques with superficial exfoliation.              1.  Skin, right chest, punch biopsy:   - EPIDERMAL SPONGIOSIS WITH KERATINOCYTE NECROSIS AND SUPERFICIAL MIXED INFLAMMATORY INFILTRATE (see comment)    Comment:  These histologic findings are compatible with a drug eruption, and would be compatible with drug related eruption with eosinophilia and systemic symptoms (DRESS) in the appropriate clinical context.  Histologic features of Olmos-Nuno  syndrome are not present.    No cancer cells noted on histologic examination. Your provider will correlate this pathology  report with your clinical findings.       1. Sections show orthokeratosis with foci of parakeratosis, neutrophils, and serum overlying epidermis with spongiosis, exocytosis of lymphocytes, and occasional scattered necrotic keratinocytes at various levels of the epidermis.  Similar changes are  seen in follicular epithelium.  Within the superficial dermis is a relatively mild perivascular inflammatory infiltrate of lymphocytes and neutrophils.  Eosinophils are not conspicuous.  Perivascular karyorrhectic debris is present; vasculitis is not  seen.  Scattered melanophages are admixed.        Thank you for your consult. Dermatology will continue to follow-up with the patient. Please contact us if you have any additional questions.    Alecia Watkins MD   Dermatology

## 2024-06-24 NOTE — ASSESSMENT & PLAN NOTE
.Pt had a recent TKA, fall after OR and femur fracture, requiring ORIF followed by wound infection and complex healing course. Bone cx alpha hemolytic strep. Required ABX therapy guided by ID c/o Linezolid followed by recent initiation of Zosyn therapy. No off zosyn.     Plan:  Ortho consultation; recs appreciated

## 2024-06-24 NOTE — SUBJECTIVE & OBJECTIVE
Interval History:   Remains afebrile. White cells normalized. Continues with diffuse rash. Dermatology following. On steroids. Awaiting medication list from nursing facility.     Review of Systems   Unable to perform ROS: Other (limited d/t lack of participation)   Genitourinary:  Positive for decreased urine volume.   Musculoskeletal:  Positive for arthralgias and joint swelling. Negative for back pain.   Skin:  Positive for rash.   Psychiatric/Behavioral:  Positive for decreased concentration. Negative for agitation and confusion.      Objective:     Vital Signs (Most Recent):  Temp: 97.3 °F (36.3 °C) (06/24/24 1154)  Pulse: 77 (06/24/24 1154)  Resp: 18 (06/24/24 0423)  BP: (!) 168/63 (06/24/24 1154)  SpO2: 98 % (06/24/24 1154) Vital Signs (24h Range):  Temp:  [97 °F (36.1 °C)-97.5 °F (36.4 °C)] 97.3 °F (36.3 °C)  Pulse:  [72-87] 77  Resp:  [18-19] 18  SpO2:  [95 %-99 %] 98 %  BP: (111-168)/(63-87) 168/63     Weight: 84.4 kg (186 lb)  Body mass index is 34.02 kg/m².    Estimated Creatinine Clearance: 9.3 mL/min (A) (based on SCr of 4.3 mg/dL (H)).     Physical Exam  Vitals and nursing note reviewed.   Constitutional:       General: She is not in acute distress.     Appearance: She is well-developed. She is obese. She is ill-appearing.   HENT:      Head: Normocephalic and atraumatic.      Right Ear: External ear normal.      Left Ear: External ear normal.      Nose: Nose normal.   Eyes:      General: No scleral icterus.        Right eye: No discharge.         Left eye: No discharge.      Extraocular Movements: Extraocular movements intact.      Conjunctiva/sclera: Conjunctivae normal.   Cardiovascular:      Rate and Rhythm: Normal rate and regular rhythm.      Heart sounds: Normal heart sounds. No murmur heard.  Pulmonary:      Effort: Pulmonary effort is normal. No respiratory distress.      Breath sounds: Normal breath sounds. No stridor. No wheezing.   Abdominal:      General: Abdomen is flat. There is no  distension.      Palpations: Abdomen is soft.      Tenderness: There is no abdominal tenderness.   Musculoskeletal:         General: Tenderness and signs of injury present. Normal range of motion.      Cervical back: Normal range of motion.   Skin:     General: Skin is warm and dry.      Findings: Erythema and rash present.      Comments: Diffuse erythematous rash with skin peeling/desquamation present. See photos below  Necrotic fibrinous knee graft   Neurological:      Mental Status: She is alert. Mental status is at baseline.      Cranial Nerves: No cranial nerve deficit.      Comments: Alert but appears lethargic and not fully oriented   Psychiatric:         Mood and Affect: Mood normal.       6/24/24 updated photos:                                  Previous:                                                              Significant Labs: CBC:   Recent Labs   Lab 06/22/24  1719 06/23/24  0300 06/24/24  0556   WBC 24.06* 17.11* 11.16   HGB 9.9* 9.7* 9.0*   HCT 28.8* 27.4* 25.2*    198 181     CMP:   Recent Labs   Lab 06/22/24  1605 06/23/24  0300 06/23/24  1038 06/23/24  1749 06/24/24  0005 06/24/24  0556    135*   < > 139 139 140   K 3.4* 2.6*   < > 3.6 3.3* 3.7    102   < > 103 100 100   CO2 7* 13*   < > 17* 20* 22*   * 118*   < > 148* 167* 153*   BUN 97* 95*   < > 92* 91* 85*   CREATININE 5.1* 4.9*   < > 4.7* 4.6* 4.3*   CALCIUM 7.8* 7.3*   < > 7.4* 7.2* 7.0*   PROT 5.9* 5.5*  --   --   --  5.2*   ALBUMIN 1.8* 1.8*  --   --   --  1.7*   BILITOT 0.2 0.2  --   --   --  0.2   ALKPHOS 67 68  --   --   --  67   AST 18 19  --   --   --  16   ALT 5* 5*  --   --   --  6*   ANIONGAP 24* 20*   < > 19* 19* 18*    < > = values in this interval not displayed.     Microbiology Results (last 7 days)       Procedure Component Value Units Date/Time    Blood culture #1 **CANNOT BE ORDERED STAT** [[id number]] Collected: 06/21/24 1443    Order Status: Completed Specimen: Blood from Peripheral, Antecubital,  Right Updated: 06/23/24 1812     Blood Culture, Routine No Growth to date      No Growth to date      No Growth to date    Blood culture #2 **CANNOT BE ORDERED STAT** [[id number]] Collected: 06/21/24 1443    Order Status: Completed Specimen: Blood from Peripheral, Antecubital, Right Updated: 06/23/24 1812     Blood Culture, Routine No Growth to date      No Growth to date      No Growth to date          Recent Lab Results  (Last 5 results in the past 24 hours)        06/24/24  1208   06/24/24  1054   06/24/24  0841   06/24/24  0556   06/24/24  0005        Albumin       1.7         ALP       67         ALT       6         Anion Gap       18   19       Aniso       Slight         AST       16         Bands       1.0         Basophilic Stippling       Occasional         Basophil %       1.0         BILIRUBIN TOTAL       0.2  Comment: For infants and newborns, interpretation of results should be based  on gestational age, weight and in agreement with clinical  observations.    Premature Infant recommended reference ranges:  Up to 24 hours.............<8.0 mg/dL  Up to 48 hours............<12.0 mg/dL  3-5 days..................<15.0 mg/dL  6-29 days.................<15.0 mg/dL           BUN       85   91       Calcium       7.0   7.2       Chloride       100   100       CO2       22   20       CPK   42             Creatinine       4.3   4.6       Differential Method       Manual         eGFR       9.5   8.7       Eos %       1.0         Fragmented Cells       Occasional         Glucose       153   167       Gran %       88.0         Hematocrit       25.2         Hemoglobin       9.0         IgA Level   399  Comment: IgA Cord Blood Reference Range: <5 mg/dL.             Immature Grans (Abs)       CANCELED  Comment: Mild elevation in immature granulocytes is non specific and   can be seen in a variety of conditions including stress response,   acute inflammation, trauma and pregnancy. Correlation with other   laboratory and  clinical findings is essential.    Result canceled by the ancillary.           Immature Granulocytes       CANCELED  Comment: Result canceled by the ancillary.         Lymph %       2.0         Magnesium        1.6         MCH       32.0         MCHC       35.7         MCV       90         Metamyelocytes       3.0         Mono %       2.0         MPV       11.7         Myelocytes       2.0         nRBC       1         Ovalocytes       Occasional         Phosphorus Level       6.7         Platelet Estimate       Appears normal         Platelet Count       181         POCT Glucose 203     202           Poikilocytosis       Slight         Poly       Occasional         Potassium       3.7   3.3       PROTEIN TOTAL       5.2         RBC       2.81         RDW       21.2         Schistocytes       Present         Sodium       140   139       Spherocytes       Occasional         WBC       11.16                                Significant Imaging:     Imaging Results    None

## 2024-06-24 NOTE — ASSESSMENT & PLAN NOTE
Concern for DRESS clinically, started on IV and topical steroids. DRESS confirmed on pathologyID recommends to continue holding antibiotics in the setting of DRESS.     - antihistamine, topical steroid  - IV methylpred, taper to be determined based off the following labs (each disease state affects treatement course) - TSH, HHV6, CMV, EMV, and HHV7 unable to be ordered   - derm consulted for drug reaction and topical corticosteroid recs  - triamcinolone daily

## 2024-06-24 NOTE — SUBJECTIVE & OBJECTIVE
Interval History: Confused and disoriented, UOP 500ml, Scr improving 4.3-->4.6-->5.4.    Review of patient's allergies indicates:   Allergen Reactions    Zosyn [piperacillin-tazobactam] Hives    Gabapentin Hallucinations     Current Facility-Administered Medications   Medication Frequency    atorvastatin tablet 10 mg Daily    cetirizine tablet 5 mg Daily    dextrose 10% bolus 125 mL 125 mL PRN    dextrose 10% bolus 250 mL 250 mL PRN    glucagon (human recombinant) injection 1 mg PRN    glucose chewable tablet 16 g PRN    glucose chewable tablet 24 g PRN    heparin (porcine) injection 5,000 Units Q8H    hydrOXYzine HCL tablet 25 mg TID PRN    insulin aspart U-100 pen 0-5 Units QID (AC + HS) PRN    lactated ringers infusion Continuous    methylPREDNISolone sodium succinate injection 80 mg Q8H    naloxone 0.4 mg/mL injection 0.02 mg PRN    QUEtiapine tablet 25 mg QHS    sodium chloride 0.9% flush 10 mL Q12H PRN    sodium chloride 0.9% flush 10 mL Q6H    And    sodium chloride 0.9% flush 10 mL PRN    triamcinolone acetonide 0.1% cream BID       Objective:     Vital Signs (Most Recent):  Temp: 97.3 °F (36.3 °C) (06/24/24 1154)  Pulse: 77 (06/24/24 1154)  Resp: 18 (06/24/24 0423)  BP: (!) 168/63 (06/24/24 1154)  SpO2: 98 % (06/24/24 1154) Vital Signs (24h Range):  Temp:  [97 °F (36.1 °C)-97.5 °F (36.4 °C)] 97.3 °F (36.3 °C)  Pulse:  [72-87] 77  Resp:  [18-19] 18  SpO2:  [95 %-99 %] 98 %  BP: (111-168)/(63-87) 168/63     Weight: 84.4 kg (186 lb) (06/21/24 1950)  Body mass index is 34.02 kg/m².  Body surface area is 1.92 meters squared.    I/O last 3 completed shifts:  In: 4798.6 [P.O.:100; I.V.:3698; IV Piggyback:1000.6]  Out: 630 [Urine:630]     Physical Exam  Constitutional:       Comments: Confused, disoriented   Pulmonary:      Effort: Pulmonary effort is normal. No respiratory distress.      Breath sounds: No wheezing, rhonchi or rales.   Musculoskeletal:      Right lower leg: No edema.      Left lower leg: No edema.    Skin:     General: Skin is warm.   Neurological:      Mental Status: She is disoriented.          Significant Labs:  BMP:   Recent Labs   Lab 06/24/24  0556   *      K 3.7      CO2 22*   BUN 85*   CREATININE 4.3*   CALCIUM 7.0*   MG 1.6     CBC:   Recent Labs   Lab 06/24/24  0556   WBC 11.16   RBC 2.81*   HGB 9.0*   HCT 25.2*      MCV 90   MCH 32.0*   MCHC 35.7        Significant Imaging:  Labs: Reviewed  ECG: Reviewed  X-Ray: Reviewed  US: Reviewed  CT: Reviewed

## 2024-06-24 NOTE — PROGRESS NOTES
[facility]  Nephrology  Progress Note    Patient Name: Dahlia Spence  MRN: [id number]  Admission Date: 6/21/2024  Hospital Length of Stay: 2 days  Attending Provider: Dwight Granados MD   Primary Care Physician: Frandy Roe MD  Principal Problem:<principal problem not specified>    Subjective:     HPI: 88 YOF with past medical history of CKD3 (baseline creatinine between 1.3-1.5), T2DM, COPD, PVD 2/2 to DM, HTN, HLD, LBP, OA who presented for a skin rash and altered mentation. The patient had a recent I&D (05/16/2024) for her R knee wound and started zosyn. Then 7 days ago she developed full body hives with rash and tongue swelling.       In the ED: patient received fluid resuscitation and IV ceftriaxone. She exhibited signs of minimal skin desqaumation amidst normal vital signs.  Nephrology has been consulted for JOSÉ on CKD. Upon arrival her creatinine on arrival was noted to be 5.0, which is increased from her baseline. Other labs showed severe HAGMA with respiratory acidosis. There does not appear to be a triple acid/base disorder when correcting for her hypoalbuminemia. She was given a bolus of LR upon arrival, UA was collected and showed 1+ ketones, 16 RBCs/ hpf, urine sodium of 19.     Interval History: Confused and disoriented, UOP 500ml, Scr improving 4.3-->4.6-->5.4.    Review of patient's allergies indicates:   Allergen Reactions    Zosyn [piperacillin-tazobactam] Hives    Gabapentin Hallucinations     Current Facility-Administered Medications   Medication Frequency    atorvastatin tablet 10 mg Daily    cetirizine tablet 5 mg Daily    dextrose 10% bolus 125 mL 125 mL PRN    dextrose 10% bolus 250 mL 250 mL PRN    glucagon (human recombinant) injection 1 mg PRN    glucose chewable tablet 16 g PRN    glucose chewable tablet 24 g PRN    heparin (porcine) injection 5,000 Units Q8H    hydrOXYzine HCL tablet 25 mg TID PRN    insulin aspart U-100 pen 0-5 Units QID (AC + HS) PRN     lactated ringers infusion Continuous    methylPREDNISolone sodium succinate injection 80 mg Q8H    naloxone 0.4 mg/mL injection 0.02 mg PRN    QUEtiapine tablet 25 mg QHS    sodium chloride 0.9% flush 10 mL Q12H PRN    sodium chloride 0.9% flush 10 mL Q6H    And    sodium chloride 0.9% flush 10 mL PRN    triamcinolone acetonide 0.1% cream BID       Objective:     Vital Signs (Most Recent):  Temp: 97.3 °F (36.3 °C) (06/24/24 1154)  Pulse: 77 (06/24/24 1154)  Resp: 18 (06/24/24 0423)  BP: (!) 168/63 (06/24/24 1154)  SpO2: 98 % (06/24/24 1154) Vital Signs (24h Range):  Temp:  [97 °F (36.1 °C)-97.5 °F (36.4 °C)] 97.3 °F (36.3 °C)  Pulse:  [72-87] 77  Resp:  [18-19] 18  SpO2:  [95 %-99 %] 98 %  BP: (111-168)/(63-87) 168/63     Weight: 84.4 kg (186 lb) (06/21/24 1950)  Body mass index is 34.02 kg/m².  Body surface area is 1.92 meters squared.    I/O last 3 completed shifts:  In: 4798.6 [P.O.:100; I.V.:3698; IV Piggyback:1000.6]  Out: 630 [Urine:630]     Physical Exam  Constitutional:       Comments: Confused, disoriented   Pulmonary:      Effort: Pulmonary effort is normal. No respiratory distress.      Breath sounds: No wheezing, rhonchi or rales.   Musculoskeletal:      Right lower leg: No edema.      Left lower leg: No edema.   Skin:     General: Skin is warm.   Neurological:      Mental Status: She is disoriented.          Significant Labs:  BMP:   Recent Labs   Lab 06/24/24  0556   *      K 3.7      CO2 22*   BUN 85*   CREATININE 4.3*   CALCIUM 7.0*   MG 1.6     CBC:   Recent Labs   Lab 06/24/24  0556   WBC 11.16   RBC 2.81*   HGB 9.0*   HCT 25.2*      MCV 90   MCH 32.0*   MCHC 35.7        Significant Imaging:  Labs: Reviewed  ECG: Reviewed  X-Ray: Reviewed  US: Reviewed  CT: Reviewed  Assessment/Plan:     Renal/  JOSÉ (acute kidney injury)  Urine microscopy showed course granular cast consistent with ATN. The sample was heavily populated with squamous cells, making identification of WBCs  or RBCs difficult however urine dipstick done in our office today did not reveal any WBCs or RBCs which contrast the urine dipstick preformed on arrival. Due to overpopulation of squamous cells in the urine sample, AIN cannot be fully excluded at this time.     Recommendations  -Renal functions are improving, no need of RRT at this time.  -Avoid nephrotoxic agents (IV contrast, NSAID's, gadolinium contrast, PPI's) if able.   -Maintain MAP above 65 mmHg.   -Strict I's and O's  -Daily renal function panel  -Renally dose all medications        Thank you for your consult. I will follow-up with patient. Please contact us if you have any additional questions.    Alem Jones MD  Nephrology  [facility]

## 2024-06-24 NOTE — ASSESSMENT & PLAN NOTE
Patient with acute kidney injury/acute renal failure likely due to pre-renal azotemia due to dehydration JOSÉ is currently improving. Baseline creatinine  1.5  - Labs reviewed- Renal function/electrolytes with Estimated Creatinine Clearance: 9.3 mL/min (A) (based on SCr of 4.3 mg/dL (H)). according to latest data. Monitor urine output and serial BMP and adjust therapy as needed. Avoid nephrotoxins and renally dose meds for GFR listed above.

## 2024-06-24 NOTE — PLAN OF CARE
2:59 PM  A JOY was sent to [facility] [place] [facility] on behalf of this patient.       Tati Braden LCSW  Case Management [facility]

## 2024-06-24 NOTE — ASSESSMENT & PLAN NOTE
88-year-old female with history of right TKA  c/b distal femur periprosthetic fracture secondary to a fall s/p right knee revision of TKA with distal femoral replacement on 4/29 (completed empiric Linezolid > Daptomycin x 10 days through 5/11). Course complicated by right knee wound dehiscence s/p surgical I&D 5/16 followed by skin graft placement on 5/23. Cultures 5/16 grew Proteus and Pseudomonas. There were concerns for possible hardware involvement (Op note with dehiscence of inferior aspect of the capsular repair). Patient was discharged on six weeks of IV Zosyn from I&D (EOC 6/27). She followed up with ID on 6/6 and knee graft noted to be necrotic with slough present.  Wound cultures  grew CONS and alpha hemalytic strep. It is unclear if patient was started on a new antibiotic since this time, awaiting records from case mgmt to clarify.     Patient presented with diffuse rash and tongue swelling. In the ED: afebrile and VSS. WBC 20K, eosinophilia noted,K 3, Cr 5, , ESR 61. She was given IVF, cefepime, antihistamine, steroids in ED.  ID consulted for recommendations.    Now, pt afebrile. HDS. Without leukocytosis. Remains anemic. Platelets 181. Sed rate 61. Dermatology following, recommended to stop unnecessary medications, including antibiotics. Pt was started on steroids. Punch biopsy obtained, pathology noting drug eruption with eosinophilia and systemic symptoms (DRESS), though negative for SJS. Recommended peripheral smear, PCR for HHV6, HHV7, EBV, CMV.     Recommendations  Okay to continue to hold antibiotics. Awaiting records from case mgmt to further assess if she has been on any new medications/antibiotics other than Zosyn  Will follow for ortho plans   Continue wound care  Discussed plan with ID staff. ID will follow closely.

## 2024-06-24 NOTE — SUBJECTIVE & OBJECTIVE
Interval History: improving skin condition    Review of Systems   Unable to perform ROS: Mental status change     Objective:     Vital Signs (Most Recent):  Temp: 97.5 °F (36.4 °C) (06/24/24 1706)  Pulse: 76 (06/24/24 1706)  Resp: 16 (06/24/24 1706)  BP: (!) 128/58 (06/24/24 1706)  SpO2: 97 % (06/24/24 1706) Vital Signs (24h Range):  Temp:  [97 °F (36.1 °C)-97.5 °F (36.4 °C)] 97.5 °F (36.4 °C)  Pulse:  [56-87] 76  Resp:  [16-19] 16  SpO2:  [95 %-99 %] 97 %  BP: (111-168)/(58-87) 128/58     Weight: 84.4 kg (186 lb)  Body mass index is 34.02 kg/m².    Intake/Output Summary (Last 24 hours) at 6/24/2024 1745  Last data filed at 6/24/2024 1730  Gross per 24 hour   Intake 3751.87 ml   Output 670 ml   Net 3081.87 ml         Physical Exam  HENT:      Head: Normocephalic.   Eyes:      Pupils: Pupils are equal, round, and reactive to light.   Cardiovascular:      Rate and Rhythm: Normal rate and regular rhythm.      Pulses: Normal pulses.      Heart sounds: Normal heart sounds.   Pulmonary:      Effort: Pulmonary effort is normal.      Breath sounds: Normal breath sounds.   Abdominal:      General: Bowel sounds are normal.      Palpations: Abdomen is soft.   Musculoskeletal:      Right lower leg: No edema.      Left lower leg: No edema.   Skin:     Capillary Refill: Capillary refill takes less than 2 seconds.      Findings: Erythema and rash present.   Neurological:      Mental Status: She is alert. She is disoriented.             Significant Labs: All pertinent labs within the past 24 hours have been reviewed.    Significant Imaging: I have reviewed all pertinent imaging results/findings within the past 24 hours.

## 2024-06-24 NOTE — ASSESSMENT & PLAN NOTE
Chronic, controlled. Latest blood pressure and vitals reviewed-     Temp:  [97 °F (36.1 °C)-97.5 °F (36.4 °C)]   Pulse:  [56-87]   Resp:  [16-19]   BP: (111-168)/(58-87)   SpO2:  [95 %-99 %] .   Home meds for hypertension were reviewed and noted below.   Hypertension Medications               lisinopriL-hydrochlorothiazide (PRINZIDE,ZESTORETIC) 20-12.5 mg per tablet TAKE 1 TABLET BY MOUTH EVERY DAY            While in the hospital, will manage blood pressure as follows; Adjust home antihypertensive regimen as follows- HOLD    Will utilize p.r.n. blood pressure medication only if patient's blood pressure greater than 180/110 and she develops symptoms such as worsening chest pain or shortness of breath.

## 2024-06-24 NOTE — CONSULTS
[facility] - Transplant Stepdown  Wound Care    Patient Name:  Dahlia Spence   MRN:  [id number]  Date: 6/24/2024  Diagnosis: <principal problem not specified>    History:     Past Medical History:   Diagnosis Date    Arthritis     Gout     High cholesterol     Hypertension        Social History     Socioeconomic History    Marital status:    Tobacco Use    Smoking status: Never    Smokeless tobacco: Never   Substance and Sexual Activity    Alcohol use: No    Drug use: No     Social Determinants of Health     Financial Resource Strain: Patient Unable To Answer (6/24/2024)    Overall Financial Resource Strain (CARDIA)     Difficulty of Paying Living Expenses: Patient unable to answer   Recent Concern: Financial Resource Strain - High Risk (6/23/2024)    Overall Financial Resource Strain (CARDIA)     Difficulty of Paying Living Expenses: Very hard   Food Insecurity: Patient Unable To Answer (6/24/2024)    Hunger Vital Sign     Worried About Running Out of Food in the Last Year: Patient unable to answer     Ran Out of Food in the Last Year: Patient unable to answer   Recent Concern: Food Insecurity - Food Insecurity Present (6/23/2024)    Hunger Vital Sign     Worried About Running Out of Food in the Last Year: Sometimes true     Ran Out of Food in the Last Year: Sometimes true   Transportation Needs: Patient Unable To Answer (6/24/2024)    TRANSPORTATION NEEDS     Transportation : Patient unable to answer   Physical Activity: Patient Unable To Answer (6/24/2024)    Exercise Vital Sign     Days of Exercise per Week: Patient unable to answer     Minutes of Exercise per Session: Patient unable to answer   Stress: Patient Unable To Answer (6/24/2024)    [employer] [place] [employer] - Occupational Stress Questionnaire     Feeling of Stress : Patient unable to answer   Recent Concern: Stress - Stress Concern Present (6/23/2024)    [employer] [place] [employer] - Occupational Stress Questionnaire      Feeling of Stress : To some extent   Housing Stability: Patient Unable To Answer (6/24/2024)    Housing Stability Vital Sign     Unable to Pay for Housing in the Last Year: Patient unable to answer     Homeless in the Last Year: Patient unable to answer       Precautions:     Allergies as of 06/21/2024 - Reviewed 06/21/2024   Allergen Reaction Noted    Zosyn [piperacillin-tazobactam] Hives 06/21/2024    Gabapentin Hallucinations 10/07/2021       WO Assessment Details/Treatment     Patient seen for wound care consultation for buttocks, R knee.   Reviewed chart for this encounter.   See Flow Sheet for findings.    Pt found lying in bed w/ daughter at bedside, agreeable to care at this time. Pt turned into lateral position and cleansed w/ no rinse bath wipes d/t large episode of stool incontinence. Per daughter, pt has required frequent instances of negra-care d/t loose stool. Triad applied for moisture barrier protection.   Knee brace and kerlix dressing removed from R knee for assessment. Per chart review, R knee w/ history of surgical repair, dehiscence, skin grafting and debridement. Wound bed is mostly covered by adhered slough and necrotic tissue. Wound cleansed w/ Vashe and dressed w/ Vashe moistened gauze for antimicrobial properties and to support autolytic debridement. Gauze then covered w/ ABD pad for drainage and secured w/ cast padding. Pt on waffle mattress w/ proper inflation, will order heel lift boots for continued care.    RECOMMENDATIONS:  BID - R knee - bedside nursing to remove old dressing and rinse wound bed w/ Vashe. Pat dry and apply Cavilon barrier to negra-wound. Cover wound bed w/ Vashe moistened gauze followed by ABD pad, secure w/ cast padding or light kerlix wrap.    BID/PRN - buttocks, perirectal, groin - cleanse gently w/ soap and water, pat dry and apply Triad to affected area, leave MANDI, no foam dressings.    Discussed POC with patient and primary nurse.   See EMR for orders & patient  education.    Bedside nursing to continue care & monitoring.  Bedside nursing to maintain pressure injury prevention interventions.       06/24/24 1320   WOCN Assessment   WOCN Total Time (mins) 30   Visit Date 06/24/24   Visit Time 1320   Consult Type New   WOCN Speciality Wound   Intervention assessed;changed;applied;chart review;coordination of care;orders   Teaching on-going        Wound 06/21/24 Moisture associated dermatitis Perirectal #1   Date First Assessed: 06/21/24   Primary Wound Type: (c) Moisture associated dermatitis  Location: Perirectal  Wound Number: #1   Wound Image    Dressing Appearance Open to air   Drainage Amount Scant   Drainage Characteristics/Odor Serosanguineous   Appearance Pink;Red;Wet   Tissue loss description Partial thickness   Periwound Area Intact;Moist;Pink   Wound Edges Irregular   Care Cleansed with:;Soap and water;Applied:;Skin Barrier        Wound 06/21/24 1448 Moisture associated dermatitis Vagina   Date First Assessed/Time First Assessed: 06/21/24 1448   Present on Original Admission: Yes  Primary Wound Type: (c) Moisture associated dermatitis  Location: Vagina   Dressing Appearance Open to air   Drainage Amount None   Drainage Characteristics/Odor No odor   Appearance Red;Pink;Moist   Tissue loss description Partial thickness   Periwound Area Intact;Moist;Pink   Wound Edges Irregular   Care Cleansed with:;Soap and water;Applied:;Skin Barrier        Wound 06/22/24 0750 Incision Right anterior;lower Knee vertical   Date First Assessed/Time First Assessed: 06/22/24 0750   Present on Original Admission: Yes  Primary Wound Type: Incision  Side: Right  Orientation: anterior;lower  Location: Knee  Wound Approximate Age at First Assessment (Weeks): 8 weeks  Incision T...   Wound Image    Dressing Appearance Moist drainage   Drainage Amount Small   Drainage Characteristics/Odor Serous   Appearance Pink;Red;Yellow;Slough;Sutures intact   Periwound Area Intact;Dry   Wound Edges  Defined   Care Cleansed with:;Sterile normal saline;Applied:;Skin Barrier   Dressing Applied;Gauze, wet to dry;Absorptive Pad;Cast padding  (Vashe)   Dressing Change Due 06/24/24

## 2024-06-24 NOTE — PLAN OF CARE
[facility] - Transplant Stepdown  Initial Discharge Assessment       Primary Care Provider: Frandy Roe MD    Admission Diagnosis: Rash [R21]  Hives [L50.9]  Allergic reaction [T78.40XA]  Cellulitis of knee, right [L03.115]  Allergic reaction to drug, initial encounter [T78.40XA]    Admission Date: 6/21/2024  Expected Discharge Date: 6/26/2024         Payor: [employer] [facility] / Plan: [employer] / Product Type: Medicare Advantage /     Extended Emergency Contact Information  Primary Emergency Contact: Penny Spence   [place]  Mobile Phone: [phone]  Relation: Son    Discharge Plan A: Skilled Nursing Facility  Discharge Plan B: Skilled Nursing Facility      [id number] - [place], [place] - [address]  [address] [address]  Phone: [phone] Fax: [phone]    [facility] [address]  [address] [address]  Phone: [phone] Fax: [phone]    [id number] [address] [address]  Phone: [phone] Fax: [phone]      Initial Assessment (most recent)       Adult Discharge Assessment - 06/24/24 1443          Discharge Assessment    Assessment Type Discharge Planning Assessment     Source of Information family     Communicated ACE with patient/caregiver Date not available/Unable to determine     People in Home facility resident   Skilled Nursing    Do you expect to return to your current living situation? Yes     Do you have help at home or someone to help you manage your care at home? Yes     Who are your caregiver(s) and their phone number(s)? The patient is currently a resident at [facility].     Walking or Climbing Stairs Difficulty yes     Dressing/Bathing Difficulty yes     Do you take prescription medications? Yes     Do you have prescription  coverage? Yes     Coverage [facility] SECURE SNP     Is the patient taking medications as prescribed? yes     Are you on dialysis? No     Do you take coumadin? No     Discharge Plan A Skilled Nursing Facility     Discharge Plan B Skilled Nursing Facility     DME Needed Upon Discharge  none     Discharge Plan discussed with: Adult children        Physical Activity    On average, how many days per week do you engage in moderate to strenuous exercise (like a brisk walk)? Patient unable to answer     On average, how many minutes do you engage in exercise at this level? Patient unable to answer        Financial Resource Strain    How hard is it for you to pay for the very basics like food, housing, medical care, and heating? Patient unable to answer        Housing Stability    In the last 12 months, was there a time when you were not able to pay the mortgage or rent on time? Patient unable to answer     At any time in the past 12 months, were you homeless or living in a shelter (including now)? Patient unable to answer        Transportation Needs    Has the lack of transportation kept you from medical appointments, meetings, work or from getting things needed for daily living? Patient unable to answer        Food Insecurity    Within the past 12 months, you worried that your food would run out before you got the money to buy more. Patient unable to answer     Within the past 12 months, the food you bought just didn't last and you didn't have money to get more. Patient unable to answer        Stress    Do you feel stress - tense, restless, nervous, or anxious, or unable to sleep at night because your mind is troubled all the time - these days? Patient unable to answer        Social Isolation    How often do you feel lonely or isolated from those around you?  Patient unable to answer        Alcohol Use    Q1: How often do you have a drink containing alcohol? Patient unable to answer      Q2: How many drinks containing alcohol do you have on a typical day when you are drinking? Patient unable to answer     Q3: How often do you have six or more drinks on one occasion? Patient unable to answer        Utilities    In the past 12 months has the electric, gas, oil, or water company threatened to shut off services in your home? Patient unable to answer        Health Literacy    How often do you need to have someone help you when you read instructions, pamphlets, or other written material from your doctor or pharmacy? Patient unable to respond                   The SW met with the patient and her daughter at bedside to complete this patient's DPA. The patient's daughter reported that the patient is at Skilled Nursing at [facility]. The patient will have assistance/ help from the staff at [facility]. The patient will need transportation to [facility].   Discharge Plan A and Plan B have been determined by review of patient's clinical status, future medical and therapeutic needs, and coverage/benefits for post-acute care in coordination with multidisciplinary team members.    Tati Braden LCSW  Case Management [facility]

## 2024-06-24 NOTE — PROGRESS NOTES
[facility] - Transplant Stepdown  Infectious Disease  Progress Note    Patient Name: Dahlia Spence  MRN: [id number]  Admission Date: 6/21/2024  Length of Stay: 2 days  Attending Physician: Dwight Granados MD  Primary Care Provider: Frandy Roe MD    Isolation Status: No active isolations  Assessment/Plan:      Derm  Hives    88-year-old female with history of right TKA  c/b distal femur periprosthetic fracture secondary to a fall s/p right knee revision of TKA with distal femoral replacement on 4/29 (completed empiric Linezolid > Daptomycin x 10 days through 5/11). Course complicated by right knee wound dehiscence s/p surgical I&D 5/16 followed by skin graft placement on 5/23. Cultures 5/16 grew Proteus and Pseudomonas. There were concerns for possible hardware involvement (Op note with dehiscence of inferior aspect of the capsular repair). Patient was discharged on six weeks of IV Zosyn from I&D (EOC 6/27). She followed up with ID on 6/6 and knee graft noted to be necrotic with slough present.  Wound cultures  grew CONS and alpha hemalytic strep. It is unclear if patient was started on a new antibiotic since this time, awaiting records from case mgmt to clarify.     Patient presented with diffuse rash and tongue swelling. In the ED: afebrile and VSS. WBC 20K, eosinophilia noted,K 3, Cr 5, , ESR 61. She was given IVF, cefepime, antihistamine, steroids in ED.  ID consulted for recommendations.    Now, pt afebrile. HDS. Without leukocytosis. Remains anemic. Platelets 181. Sed rate 61. Dermatology following, recommended to stop unnecessary medications, including antibiotics. Pt was started on steroids. Punch biopsy obtained, pathology noting drug eruption with eosinophilia and systemic symptoms (DRESS), though negative for SJS. Recommended peripheral smear, PCR for HHV6, HHV7, EBV, CMV.     Recommendations  Okay to continue to hold antibiotics. Awaiting records from case mgmt to further assess if she has  been on any new medications/antibiotics other than Zosyn  Will follow for ortho plans   Continue wound care  Discussed plan with ID staff. ID will follow closely.             Thank you for your consult. I will follow-up with patient. Please contact us if you have any additional questions.    Silverio Newell NP  Infectious Disease  [facility] - Transplant Stepdown    Subjective:     Principal Problem:<principal problem not specified>    HPI: Dahlia Spence is an 88-year-old female with past medical history of hypertension, hyperlipidemia, osteoarthritis, DM, PVD who presented to [facility] ED 6/21 for a diffuse skin rash.     Patient has a history of right TKA 4/22/2024. She was working with physical therapy postoperatively and sustained a distal femur periprosthetic fracture of the right knee secondary to a fall. Due to proximity with knee arthroplasty, she underwent a right knee revision of TKA with distal femoral replacement on 4/29/2024. Due to increased risk of post op infection, ID was consulted and recommended 10 days of empiric antibiotics. She was initially on Linezolid (d/c due to thrombocytopenia) and completed course with Daptomycin (EOC 5/11). Patient was discharged to a nursing facility though represented 05/15/2024 with wound dehiscence. On 5/16 she underwent surgical I&D followed by muscle flap and skin graft placement on 5/23. Cultures 5/16 grew pan susceptible Proteus and Pseudomonas. There were concerns for possible hardware involvement (Op note with dehiscence of inferior aspect of the capsular repair). ID recommended six weeks of IV Zosyn from I&D (EOC 6/27).    Patient followed up with ID on 6/6. Her wound had some evidence of slough and necrosis and she underwent light debridement. Wound cultures  grew CONS and alpha hemalytic strep. Patient's daugher is at bedside and reports she was suppose to start a new oral antibiotic but doesn't believe the patient started in. Over the last week the patient has  developed a full body rash and AMS. She was brought to the ED after complaining of tongue swelling.     In the ED: afebrile and VSS. WBC 20K, eosinophilia noted,K 3, Cr 5, , ESR 61. She was given IVF, Cefepime, antihistamine, steroids.  ID consulted for recommendations.  Interval History:   Remains afebrile. White cells normalized. Continues with diffuse rash. Dermatology following. On steroids. Awaiting medication list from nursing facility.     Review of Systems   Unable to perform ROS: Other (limited d/t lack of participation)   Genitourinary:  Positive for decreased urine volume.   Musculoskeletal:  Positive for arthralgias and joint swelling. Negative for back pain.   Skin:  Positive for rash.   Psychiatric/Behavioral:  Positive for decreased concentration. Negative for agitation and confusion.      Objective:     Vital Signs (Most Recent):  Temp: 97.3 °F (36.3 °C) (06/24/24 1154)  Pulse: 77 (06/24/24 1154)  Resp: 18 (06/24/24 0423)  BP: (!) 168/63 (06/24/24 1154)  SpO2: 98 % (06/24/24 1154) Vital Signs (24h Range):  Temp:  [97 °F (36.1 °C)-97.5 °F (36.4 °C)] 97.3 °F (36.3 °C)  Pulse:  [72-87] 77  Resp:  [18-19] 18  SpO2:  [95 %-99 %] 98 %  BP: (111-168)/(63-87) 168/63     Weight: 84.4 kg (186 lb)  Body mass index is 34.02 kg/m².    Estimated Creatinine Clearance: 9.3 mL/min (A) (based on SCr of 4.3 mg/dL (H)).     Physical Exam  Vitals and nursing note reviewed.   Constitutional:       General: She is not in acute distress.     Appearance: She is well-developed. She is obese. She is ill-appearing.   HENT:      Head: Normocephalic and atraumatic.      Right Ear: External ear normal.      Left Ear: External ear normal.      Nose: Nose normal.   Eyes:      General: No scleral icterus.        Right eye: No discharge.         Left eye: No discharge.      Extraocular Movements: Extraocular movements intact.      Conjunctiva/sclera: Conjunctivae normal.   Cardiovascular:      Rate and Rhythm: Normal rate and  regular rhythm.      Heart sounds: Normal heart sounds. No murmur heard.  Pulmonary:      Effort: Pulmonary effort is normal. No respiratory distress.      Breath sounds: Normal breath sounds. No stridor. No wheezing.   Abdominal:      General: Abdomen is flat. There is no distension.      Palpations: Abdomen is soft.      Tenderness: There is no abdominal tenderness.   Musculoskeletal:         General: Tenderness and signs of injury present. Normal range of motion.      Cervical back: Normal range of motion.   Skin:     General: Skin is warm and dry.      Findings: Erythema and rash present.      Comments: Diffuse erythematous rash with skin peeling/desquamation present. See photos below  Necrotic fibrinous knee graft   Neurological:      Mental Status: She is alert. Mental status is at baseline.      Cranial Nerves: No cranial nerve deficit.      Comments: Alert but appears lethargic and not fully oriented   Psychiatric:         Mood and Affect: Mood normal.       6/24/24 updated photos:                                  Previous:                                                              Significant Labs: CBC:   Recent Labs   Lab 06/22/24  1719 06/23/24  0300 06/24/24  0556   WBC 24.06* 17.11* 11.16   HGB 9.9* 9.7* 9.0*   HCT 28.8* 27.4* 25.2*    198 181     CMP:   Recent Labs   Lab 06/22/24  1605 06/23/24  0300 06/23/24  1038 06/23/24  1749 06/24/24  0005 06/24/24  0556    135*   < > 139 139 140   K 3.4* 2.6*   < > 3.6 3.3* 3.7    102   < > 103 100 100   CO2 7* 13*   < > 17* 20* 22*   * 118*   < > 148* 167* 153*   BUN 97* 95*   < > 92* 91* 85*   CREATININE 5.1* 4.9*   < > 4.7* 4.6* 4.3*   CALCIUM 7.8* 7.3*   < > 7.4* 7.2* 7.0*   PROT 5.9* 5.5*  --   --   --  5.2*   ALBUMIN 1.8* 1.8*  --   --   --  1.7*   BILITOT 0.2 0.2  --   --   --  0.2   ALKPHOS 67 68  --   --   --  67   AST 18 19  --   --   --  16   ALT 5* 5*  --   --   --  6*   ANIONGAP 24* 20*   < > 19* 19* 18*    < > = values in  this interval not displayed.     Microbiology Results (last 7 days)       Procedure Component Value Units Date/Time    Blood culture #1 **CANNOT BE ORDERED STAT** [[id number]] Collected: 06/21/24 1443    Order Status: Completed Specimen: Blood from Peripheral, Antecubital, Right Updated: 06/23/24 1812     Blood Culture, Routine No Growth to date      No Growth to date      No Growth to date    Blood culture #2 **CANNOT BE ORDERED STAT** [[id number]] Collected: 06/21/24 1443    Order Status: Completed Specimen: Blood from Peripheral, Antecubital, Right Updated: 06/23/24 1812     Blood Culture, Routine No Growth to date      No Growth to date      No Growth to date          Recent Lab Results  (Last 5 results in the past 24 hours)        06/24/24  1208   06/24/24  1054   06/24/24  0841   06/24/24  0556   06/24/24  0005        Albumin       1.7         ALP       67         ALT       6         Anion Gap       18   19       Aniso       Slight         AST       16         Bands       1.0         Basophilic Stippling       Occasional         Basophil %       1.0         BILIRUBIN TOTAL       0.2  Comment: For infants and newborns, interpretation of results should be based  on gestational age, weight and in agreement with clinical  observations.    Premature Infant recommended reference ranges:  Up to 24 hours.............<8.0 mg/dL  Up to 48 hours............<12.0 mg/dL  3-5 days..................<15.0 mg/dL  6-29 days.................<15.0 mg/dL           BUN       85   91       Calcium       7.0   7.2       Chloride       100   100       CO2       22   20       CPK   42             Creatinine       4.3   4.6       Differential Method       Manual         eGFR       9.5   8.7       Eos %       1.0         Fragmented Cells       Occasional         Glucose       153   167       Gran %       88.0         Hematocrit       25.2         Hemoglobin       9.0         IgA Level   399  Comment: IgA Cord Blood Reference Range: <5  mg/dL.             Immature Grans (Abs)       CANCELED  Comment: Mild elevation in immature granulocytes is non specific and   can be seen in a variety of conditions including stress response,   acute inflammation, trauma and pregnancy. Correlation with other   laboratory and clinical findings is essential.    Result canceled by the ancillary.           Immature Granulocytes       CANCELED  Comment: Result canceled by the ancillary.         Lymph %       2.0         Magnesium        1.6         MCH       32.0         MCHC       35.7         MCV       90         Metamyelocytes       3.0         Mono %       2.0         MPV       11.7         Myelocytes       2.0         nRBC       1         Ovalocytes       Occasional         Phosphorus Level       6.7         Platelet Estimate       Appears normal         Platelet Count       181         POCT Glucose 203     202           Poikilocytosis       Slight         Poly       Occasional         Potassium       3.7   3.3       PROTEIN TOTAL       5.2         RBC       2.81         RDW       21.2         Schistocytes       Present         Sodium       140   139       Spherocytes       Occasional         WBC       11.16                                Significant Imaging:     Imaging Results    None

## 2024-06-25 PROBLEM — R53.1 WEAKNESS: Status: ACTIVE | Noted: 2024-06-25

## 2024-06-25 LAB
25(OH)D3+25(OH)D2 SERPL-MCNC: 10 NG/ML (ref 30–96)
ALBUMIN SERPL BCP-MCNC: 1.7 G/DL (ref 3.5–5.2)
ALBUMIN SERPL BCP-MCNC: 1.7 G/DL (ref 3.5–5.2)
ALP SERPL-CCNC: 76 U/L (ref 55–135)
ALT SERPL W/O P-5'-P-CCNC: 7 U/L (ref 10–44)
ANION GAP SERPL CALC-SCNC: 15 MMOL/L (ref 8–16)
ANION GAP SERPL CALC-SCNC: 18 MMOL/L (ref 8–16)
ANISOCYTOSIS BLD QL SMEAR: SLIGHT
AST SERPL-CCNC: 25 U/L (ref 10–40)
BASOPHILS # BLD AUTO: ABNORMAL K/UL (ref 0–0.2)
BASOPHILS NFR BLD: 0 % (ref 0–1.9)
BILIRUB SERPL-MCNC: 0.2 MG/DL (ref 0.1–1)
BUN SERPL-MCNC: 76 MG/DL (ref 8–23)
BUN SERPL-MCNC: 81 MG/DL (ref 8–23)
CA-I BLDV-SCNC: 0.91 MMOL/L (ref 1.06–1.42)
CALCIUM SERPL-MCNC: 6.9 MG/DL (ref 8.7–10.5)
CALCIUM SERPL-MCNC: 7.6 MG/DL (ref 8.7–10.5)
CHLORIDE SERPL-SCNC: 100 MMOL/L (ref 95–110)
CHLORIDE SERPL-SCNC: 102 MMOL/L (ref 95–110)
CMV DNA SPEC QL NAA+PROBE: ABNORMAL
CO2 SERPL-SCNC: 21 MMOL/L (ref 23–29)
CO2 SERPL-SCNC: 24 MMOL/L (ref 23–29)
CREAT SERPL-MCNC: 3.2 MG/DL (ref 0.5–1.4)
CREAT SERPL-MCNC: 3.8 MG/DL (ref 0.5–1.4)
CYTOMEGALOVIRUS LOG (IU/ML): 2.09 LOGIU/ML
CYTOMEGALOVIRUS PCR, QUANT: 122 IU/ML
DIFFERENTIAL METHOD BLD: ABNORMAL
EOSINOPHIL # BLD AUTO: ABNORMAL K/UL (ref 0–0.5)
EOSINOPHIL NFR BLD: 0 % (ref 0–8)
EPSTEIN-BARR VIRUS DNA: ABNORMAL
EPSTEIN-BARR VIRUS PCR, QUANT: ABNORMAL IU/ML
ERYTHROCYTE [DISTWIDTH] IN BLOOD BY AUTOMATED COUNT: 20.9 % (ref 11.5–14.5)
EST. GFR  (NO RACE VARIABLE): 11 ML/MIN/1.73 M^2
EST. GFR  (NO RACE VARIABLE): 13.5 ML/MIN/1.73 M^2
GLUCOSE SERPL-MCNC: 128 MG/DL (ref 70–110)
GLUCOSE SERPL-MCNC: 139 MG/DL (ref 70–110)
HCT VFR BLD AUTO: 22.9 % (ref 37–48.5)
HGB BLD-MCNC: 8.2 G/DL (ref 12–16)
HYPOCHROMIA BLD QL SMEAR: ABNORMAL
IMM GRANULOCYTES # BLD AUTO: ABNORMAL K/UL (ref 0–0.04)
IMM GRANULOCYTES NFR BLD AUTO: ABNORMAL % (ref 0–0.5)
LYMPHOCYTES # BLD AUTO: ABNORMAL K/UL (ref 1–4.8)
LYMPHOCYTES NFR BLD: 3 % (ref 18–48)
MAGNESIUM SERPL-MCNC: 1.6 MG/DL (ref 1.6–2.6)
MCH RBC QN AUTO: 32.2 PG (ref 27–31)
MCHC RBC AUTO-ENTMCNC: 35.8 G/DL (ref 32–36)
MCV RBC AUTO: 90 FL (ref 82–98)
MONOCYTES # BLD AUTO: ABNORMAL K/UL (ref 0.3–1)
MONOCYTES NFR BLD: 0 % (ref 4–15)
NEUTROPHILS NFR BLD: 96 % (ref 38–73)
NEUTS BAND NFR BLD MANUAL: 1 %
NRBC BLD-RTO: 0 /100 WBC
OVALOCYTES BLD QL SMEAR: ABNORMAL
PHOSPHATE SERPL-MCNC: 6 MG/DL (ref 2.7–4.5)
PHOSPHATE SERPL-MCNC: 6.3 MG/DL (ref 2.7–4.5)
PLATELET # BLD AUTO: 151 K/UL (ref 150–450)
PMV BLD AUTO: 11.2 FL (ref 9.2–12.9)
POCT GLUCOSE: 146 MG/DL (ref 70–110)
POCT GLUCOSE: 161 MG/DL (ref 70–110)
POCT GLUCOSE: 172 MG/DL (ref 70–110)
POIKILOCYTOSIS BLD QL SMEAR: SLIGHT
POLYCHROMASIA BLD QL SMEAR: ABNORMAL
POTASSIUM SERPL-SCNC: 3.1 MMOL/L (ref 3.5–5.1)
POTASSIUM SERPL-SCNC: 3.2 MMOL/L (ref 3.5–5.1)
PROT SERPL-MCNC: 4.7 G/DL (ref 6–8.4)
PROTEINASE3 IGG SER-ACNC: <0.2 U
PTH-INTACT SERPL-MCNC: 255.1 PG/ML (ref 9–77)
RBC # BLD AUTO: 2.55 M/UL (ref 4–5.4)
SODIUM SERPL-SCNC: 139 MMOL/L (ref 136–145)
SODIUM SERPL-SCNC: 141 MMOL/L (ref 136–145)
SPHEROCYTES BLD QL SMEAR: ABNORMAL
TSH SERPL DL<=0.005 MIU/L-ACNC: 0.9 UIU/ML (ref 0.4–4)
WBC # BLD AUTO: 9.72 K/UL (ref 3.9–12.7)

## 2024-06-25 PROCEDURE — 97535 SELF CARE MNGMENT TRAINING: CPT

## 2024-06-25 PROCEDURE — 84443 ASSAY THYROID STIM HORMONE: CPT

## 2024-06-25 PROCEDURE — 87533 HHV-6 DNA QUANT: CPT

## 2024-06-25 PROCEDURE — 25000003 PHARM REV CODE 250: Mod: JZ,JG

## 2024-06-25 PROCEDURE — 63600175 PHARM REV CODE 636 W HCPCS: Performed by: INTERNAL MEDICINE

## 2024-06-25 PROCEDURE — 80069 RENAL FUNCTION PANEL: CPT

## 2024-06-25 PROCEDURE — 82330 ASSAY OF CALCIUM: CPT | Performed by: INTERNAL MEDICINE

## 2024-06-25 PROCEDURE — 82306 VITAMIN D 25 HYDROXY: CPT | Performed by: INTERNAL MEDICINE

## 2024-06-25 PROCEDURE — 85007 BL SMEAR W/DIFF WBC COUNT: CPT

## 2024-06-25 PROCEDURE — 20600001 HC STEP DOWN PRIVATE ROOM

## 2024-06-25 PROCEDURE — 99233 SBSQ HOSP IP/OBS HIGH 50: CPT | Mod: ,,, | Performed by: INTERNAL MEDICINE

## 2024-06-25 PROCEDURE — 85027 COMPLETE CBC AUTOMATED: CPT

## 2024-06-25 PROCEDURE — 84100 ASSAY OF PHOSPHORUS: CPT

## 2024-06-25 PROCEDURE — 94761 N-INVAS EAR/PLS OXIMETRY MLT: CPT

## 2024-06-25 PROCEDURE — 97165 OT EVAL LOW COMPLEX 30 MIN: CPT

## 2024-06-25 PROCEDURE — 63600175 PHARM REV CODE 636 W HCPCS

## 2024-06-25 PROCEDURE — 83970 ASSAY OF PARATHORMONE: CPT | Performed by: INTERNAL MEDICINE

## 2024-06-25 PROCEDURE — 83735 ASSAY OF MAGNESIUM: CPT

## 2024-06-25 PROCEDURE — 87799 DETECT AGENT NOS DNA QUANT: CPT

## 2024-06-25 PROCEDURE — 25000003 PHARM REV CODE 250

## 2024-06-25 PROCEDURE — 80053 COMPREHEN METABOLIC PANEL: CPT

## 2024-06-25 RX ORDER — CALCIUM CARBONATE 200(500)MG
500 TABLET,CHEWABLE ORAL DAILY
Status: DISCONTINUED | OUTPATIENT
Start: 2024-06-26 | End: 2024-06-25

## 2024-06-25 RX ORDER — POTASSIUM CHLORIDE 7.45 MG/ML
30 INJECTION INTRAVENOUS
Status: DISPENSED | OUTPATIENT
Start: 2024-06-25 | End: 2024-06-25

## 2024-06-25 RX ORDER — POTASSIUM CHLORIDE 7.45 MG/ML
20 INJECTION INTRAVENOUS
Status: DISCONTINUED | OUTPATIENT
Start: 2024-06-25 | End: 2024-06-25

## 2024-06-25 RX ORDER — MAGNESIUM SULFATE 1 G/100ML
1 INJECTION INTRAVENOUS ONCE
Status: COMPLETED | OUTPATIENT
Start: 2024-06-25 | End: 2024-06-25

## 2024-06-25 RX ORDER — POTASSIUM CHLORIDE 7.45 MG/ML
10 INJECTION INTRAVENOUS
Status: COMPLETED | OUTPATIENT
Start: 2024-06-25 | End: 2024-06-26

## 2024-06-25 RX ORDER — SEVELAMER CARBONATE 800 MG/1
800 TABLET, FILM COATED ORAL
Status: DISCONTINUED | OUTPATIENT
Start: 2024-06-25 | End: 2024-06-27

## 2024-06-25 RX ORDER — FERROUS SULFATE, DRIED 160(50) MG
1 TABLET, EXTENDED RELEASE ORAL DAILY
Status: DISCONTINUED | OUTPATIENT
Start: 2024-06-26 | End: 2024-06-26

## 2024-06-25 RX ORDER — CALCIUM GLUCONATE 20 MG/ML
1 INJECTION, SOLUTION INTRAVENOUS
Status: COMPLETED | OUTPATIENT
Start: 2024-06-25 | End: 2024-06-25

## 2024-06-25 RX ORDER — POTASSIUM CHLORIDE 7.45 MG/ML
40 INJECTION INTRAVENOUS ONCE
Status: DISCONTINUED | OUTPATIENT
Start: 2024-06-25 | End: 2024-06-25

## 2024-06-25 RX ADMIN — CETIRIZINE HYDROCHLORIDE 5 MG: 5 TABLET, FILM COATED ORAL at 08:06

## 2024-06-25 RX ADMIN — TRIAMCINOLONE ACETONIDE: 1 CREAM TOPICAL at 01:06

## 2024-06-25 RX ADMIN — METHYLPREDNISOLONE SODIUM SUCCINATE 80 MG: 125 INJECTION, POWDER, FOR SOLUTION INTRAMUSCULAR; INTRAVENOUS at 05:06

## 2024-06-25 RX ADMIN — HEPARIN SODIUM 5000 UNITS: 5000 INJECTION INTRAVENOUS; SUBCUTANEOUS at 02:06

## 2024-06-25 RX ADMIN — HEPARIN SODIUM 5000 UNITS: 5000 INJECTION INTRAVENOUS; SUBCUTANEOUS at 05:06

## 2024-06-25 RX ADMIN — SODIUM CHLORIDE, POTASSIUM CHLORIDE, SODIUM LACTATE AND CALCIUM CHLORIDE: 600; 310; 30; 20 INJECTION, SOLUTION INTRAVENOUS at 09:06

## 2024-06-25 RX ADMIN — TRIAMCINOLONE ACETONIDE: 1 CREAM TOPICAL at 09:06

## 2024-06-25 RX ADMIN — ATORVASTATIN CALCIUM 10 MG: 10 TABLET, FILM COATED ORAL at 08:06

## 2024-06-25 RX ADMIN — CALCIUM GLUCONATE 1 G: 20 INJECTION, SOLUTION INTRAVENOUS at 05:06

## 2024-06-25 RX ADMIN — MAGNESIUM SULFATE HEPTAHYDRATE 1 G: 500 INJECTION, SOLUTION INTRAMUSCULAR; INTRAVENOUS at 04:06

## 2024-06-25 RX ADMIN — CALCIUM GLUCONATE 1 G: 20 INJECTION, SOLUTION INTRAVENOUS at 06:06

## 2024-06-25 RX ADMIN — METHYLPREDNISOLONE SODIUM SUCCINATE 80 MG: 125 INJECTION, POWDER, FOR SOLUTION INTRAMUSCULAR; INTRAVENOUS at 08:06

## 2024-06-25 RX ADMIN — HEPARIN SODIUM 5000 UNITS: 5000 INJECTION INTRAVENOUS; SUBCUTANEOUS at 08:06

## 2024-06-25 RX ADMIN — QUETIAPINE FUMARATE 25 MG: 25 TABLET ORAL at 08:06

## 2024-06-25 RX ADMIN — SODIUM CHLORIDE, POTASSIUM CHLORIDE, SODIUM LACTATE AND CALCIUM CHLORIDE: 600; 310; 30; 20 INJECTION, SOLUTION INTRAVENOUS at 04:06

## 2024-06-25 RX ADMIN — METHYLPREDNISOLONE SODIUM SUCCINATE 80 MG: 125 INJECTION, POWDER, FOR SOLUTION INTRAMUSCULAR; INTRAVENOUS at 02:06

## 2024-06-25 RX ADMIN — POTASSIUM CHLORIDE 10 MEQ: 7.46 INJECTION, SOLUTION INTRAVENOUS at 10:06

## 2024-06-25 RX ADMIN — POTASSIUM CHLORIDE 30 MEQ: 7.46 INJECTION, SOLUTION INTRAVENOUS at 10:06

## 2024-06-25 RX ADMIN — POTASSIUM CHLORIDE 10 MEQ: 7.46 INJECTION, SOLUTION INTRAVENOUS at 11:06

## 2024-06-25 NOTE — ASSESSMENT & PLAN NOTE
88-year-old female with history of right TKA  c/b distal femur periprosthetic fracture secondary to a fall s/p right knee revision of TKA with distal femoral replacement on 4/29 (completed empiric Linezolid > Daptomycin x 10 days through 5/11). Course complicated by right knee wound dehiscence s/p surgical I&D 5/16 followed by skin graft placement on 5/23. Cultures 5/16 grew Proteus and Pseudomonas. There were concerns for possible hardware involvement (Op note with dehiscence of inferior aspect of the capsular repair). Patient was discharged on six weeks of IV Zosyn from I&D (EOC 6/27). She followed up with ID on 6/6 and knee graft noted to be necrotic with slough present.  Wound cultures  grew CONS and alpha hemalytic strep. It is unclear if patient was started on a new antibiotic since this time, awaiting records from case mgmt to clarify.     Patient presented with diffuse rash and tongue swelling. In the ED: afebrile and VSS. WBC 20K, eosinophilia noted,K 3, Cr 5, , ESR 61. She was given IVF, cefepime, antihistamine, steroids in ED.  ID consulted for recommendations.    Now, pt afebrile. HDS. Without leukocytosis. Remains anemic. Platelets 181. Sed rate 61. Dermatology following, recommended to stop unnecessary medications, including antibiotics. Pt was started on steroids. Punch biopsy obtained, pathology noting drug eruption with eosinophilia and systemic symptoms (DRESS), though negative for SJS. Recommended peripheral smear, PCR for HHV6, HHV7, EBV, CMV.     Ortho consulted. 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 Recommending follow up with ortho team at .       Recommendations  Okay to continue to hold antibiotics. Awaiting records from case mgmt to further assess if she has been on any new medications/antibiotics other than Zosyn  Pt with limited antibiotic options. unable to rule out chronic PJI at this time. Will need further imaging. Primary team  discussing with ortho surgery. Will follow for recs.   Continue wound care. Optimize nutrition.   Discussed plan with ID staff. ID will follow closely.

## 2024-06-25 NOTE — PROGRESS NOTES
[facility] - [facility]  Nephrology  Progress Note    Patient Name: Dahlia Spence  MRN: [id number]  Admission Date: 6/21/2024  Hospital Length of Stay: 3 days  Attending Provider: Dwight Granados MD   Primary Care Physician: Frandy Roe MD  Principal Problem:<principal problem not specified>    Subjective:     HPI: 88 YOF with past medical history of CKD3 (baseline creatinine between 1.3-1.5), T2DM, COPD, PVD 2/2 to DM, HTN, HLD, LBP, OA who presented for a skin rash and altered mentation. The patient had a recent I&D (05/16/2024) for her R knee wound and started zosyn. Then 7 days ago she developed full body hives with rash and tongue swelling.       In the ED: patient received fluid resuscitation and IV ceftriaxone. She exhibited signs of minimal skin desqaumation amidst normal vital signs.  Nephrology has been consulted for JOSÉ on CKD. Upon arrival her creatinine on arrival was noted to be 5.0, which is increased from her baseline. Other labs showed severe HAGMA with respiratory acidosis. There does not appear to be a triple acid/base disorder when correcting for her hypoalbuminemia. She was given a bolus of LR upon arrival, UA was collected and showed 1+ ketones, 16 RBCs/ hpf, urine sodium of 19.     Interval History: Renal functions improving, low vit D and hypocalcemia.  Still disoriented and drowsy.  Review of patient's allergies indicates:   Allergen Reactions    Zosyn [piperacillin-tazobactam] Hives    Gabapentin Hallucinations     Current Facility-Administered Medications   Medication Frequency    atorvastatin tablet 10 mg Daily    cetirizine tablet 5 mg Daily    dextrose 10% bolus 125 mL 125 mL PRN    dextrose 10% bolus 250 mL 250 mL PRN    glucagon (human recombinant) injection 1 mg PRN    glucose chewable tablet 16 g PRN    glucose chewable tablet 24 g PRN    heparin (porcine) injection 5,000 Units Q8H    hydrOXYzine HCL tablet 25 mg TID PRN    insulin aspart U-100 pen 0-5 Units QID (AC +  HS) PRN    lactated ringers infusion Continuous    magnesium sulfate in dextrose IVPB (premix) 1 g Once    methylPREDNISolone sodium succinate injection 80 mg Q8H    naloxone 0.4 mg/mL injection 0.02 mg PRN    QUEtiapine tablet 25 mg QHS    sodium chloride 0.9% flush 10 mL Q12H PRN    sodium chloride 0.9% flush 10 mL Q6H    And    sodium chloride 0.9% flush 10 mL PRN    triamcinolone acetonide 0.1% cream BID       Objective:     Vital Signs (Most Recent):  Temp: 97.1 °F (36.2 °C) (06/25/24 1132)  Pulse: 66 (06/25/24 1132)  Resp: 17 (06/25/24 1132)  BP: (!) 142/77 (06/25/24 1132)  SpO2: 100 % (06/25/24 1132) Vital Signs (24h Range):  Temp:  [97 °F (36.1 °C)-97.8 °F (36.6 °C)] 97.1 °F (36.2 °C)  Pulse:  [56-88] 66  Resp:  [16-20] 17  SpO2:  [77 %-100 %] 100 %  BP: (107-188)/(46-77) 142/77     Weight: 84.4 kg (186 lb) (06/21/24 1950)  Body mass index is 34.02 kg/m².  Body surface area is 1.92 meters squared.    I/O last 3 completed shifts:  In: 5104 [P.O.:30; I.V.:4574; IV Piggyback:500]  Out: 1070 [Urine:1070]     Physical Exam  Constitutional:       Comments: Sleepy, disoriented   Pulmonary:      Effort: Pulmonary effort is normal. No respiratory distress.      Breath sounds: No wheezing, rhonchi or rales.   Musculoskeletal:      Right lower leg: No edema.      Left lower leg: No edema.   Skin:     General: Skin is warm.   Neurological:      Mental Status: She is disoriented.          Significant Labs:  BMP:   Recent Labs   Lab 06/25/24 0422   *      K 3.1*      CO2 24   BUN 81*   CREATININE 3.8*   CALCIUM 6.9*   MG 1.6     CBC:   Recent Labs   Lab 06/25/24 0422   WBC 9.72   RBC 2.55*   HGB 8.2*   HCT 22.9*      MCV 90   MCH 32.2*   MCHC 35.8        Significant Imaging:  Labs: Reviewed  ECG: Reviewed  X-Ray: Reviewed  US: Reviewed  Assessment/Plan:     Renal/  JOSÉ (acute kidney injury)  Urine microscopy showed course granular cast consistent with ATN. The sample was heavily populated  with squamous cells, making identification of WBCs or RBCs difficult however urine dipstick done in our office today did not reveal any WBCs or RBCs which contrast the urine dipstick preformed on arrival. Due to overpopulation of squamous cells in the urine sample, AIN cannot be fully excluded at this time.     Recommendations;  -Hypocalcemia should be addressed (P/O or I/V supplements).  -Renal functions are improving, no need of RRT at this time.  -Avoid nephrotoxic agents (IV contrast, NSAID's, gadolinium contrast, PPI's) if able.   -Maintain MAP above 65 mmHg.   -Strict I's and O's  -Daily renal function panel  -Renally dose all medications        Thank you for your consult. I will follow-up with patient. Please contact us if you have any additional questions.    Alem Jones MD  Nephrology  [facility]

## 2024-06-25 NOTE — NURSING
Nurses Note -- 4 Eyes      6/24/2024   7:21 PM      Skin assessed during: Q Shift Change      [] No Altered Skin Integrity Present    []Prevention Measures Documented      [x] Yes- Altered Skin Integrity Present or Discovered   [] LDA Added if Not in Epic (Describe Wound)   [x] New Altered Skin Integrity was Present on Admit and Documented in LDA   [x] Wound Image Taken    Wound Care Consulted? Yes    Attending Nurse: Shayne Polanco RN    Second RN/Staff Member:   Wanda Garcia RN

## 2024-06-25 NOTE — SUBJECTIVE & OBJECTIVE
Interval History:     Afebrile. No white count. Rash improving. Remains disoriented.      Review of Systems   Unable to perform ROS: Other (disoriented)     Objective:     Vital Signs (Most Recent):  Temp: 97.1 °F (36.2 °C) (06/25/24 1132)  Pulse: 66 (06/25/24 1132)  Resp: 17 (06/25/24 1132)  BP: (!) 142/77 (06/25/24 1132)  SpO2: 100 % (06/25/24 1132) Vital Signs (24h Range):  Temp:  [97 °F (36.1 °C)-97.8 °F (36.6 °C)] 97.1 °F (36.2 °C)  Pulse:  [62-88] 66  Resp:  [16-20] 17  SpO2:  [77 %-100 %] 100 %  BP: (107-188)/(46-77) 142/77     Weight: 84.4 kg (186 lb)  Body mass index is 34.02 kg/m².    Estimated Creatinine Clearance: 10.5 mL/min (A) (based on SCr of 3.8 mg/dL (H)).     Physical Exam  Vitals and nursing note reviewed.   Constitutional:       General: She is not in acute distress.     Appearance: She is well-developed. She is obese. She is ill-appearing.   HENT:      Head: Normocephalic and atraumatic.      Right Ear: External ear normal.      Left Ear: External ear normal.      Nose: Nose normal.   Eyes:      General: No scleral icterus.        Right eye: No discharge.         Left eye: No discharge.      Extraocular Movements: Extraocular movements intact.      Conjunctiva/sclera: Conjunctivae normal.   Cardiovascular:      Rate and Rhythm: Normal rate and regular rhythm.      Heart sounds: Normal heart sounds. No murmur heard.  Pulmonary:      Effort: Pulmonary effort is normal. No respiratory distress.      Breath sounds: Normal breath sounds. No stridor. No wheezing.   Abdominal:      General: Abdomen is flat. There is no distension.      Palpations: Abdomen is soft.      Tenderness: There is no abdominal tenderness.   Musculoskeletal:         General: Tenderness and signs of injury present. Normal range of motion.      Cervical back: Normal range of motion.   Skin:     General: Skin is warm and dry.      Findings: Erythema and rash present.      Comments: Diffuse erythematous rash with skin  peeling/desquamation present. See photos below  Necrotic fibrinous knee graft   Neurological:      Mental Status: She is alert. She is disoriented.      Comments: Alert but disoriented.        6/24/24 updated photos:                                  Previous:                                                              Significant Labs: CBC:   Recent Labs   Lab 06/24/24  0556 06/25/24 0422   WBC 11.16 9.72   HGB 9.0* 8.2*   HCT 25.2* 22.9*    151     CMP:   Recent Labs   Lab 06/24/24  0556 06/24/24 2029 06/25/24 0422    140 139   K 3.7 3.3* 3.1*    98 100   CO2 22* 23 24   * 137* 128*   BUN 85* 84* 81*   CREATININE 4.3* 4.0* 3.8*   CALCIUM 7.0* 7.1* 6.9*   PROT 5.2*  --  4.7*   ALBUMIN 1.7* 1.8* 1.7*   BILITOT 0.2  --  0.2   ALKPHOS 67  --  76   AST 16  --  25   ALT 6*  --  7*   ANIONGAP 18* 19* 15     Microbiology Results (last 7 days)       Procedure Component Value Units Date/Time    Blood culture #1 **CANNOT BE ORDERED STAT** [[id number]] Collected: 06/21/24 1443    Order Status: Completed Specimen: Blood from Peripheral, Antecubital, Right Updated: 06/24/24 1812     Blood Culture, Routine No Growth to date      No Growth to date      No Growth to date      No Growth to date    Blood culture #2 **CANNOT BE ORDERED STAT** [[id number]] Collected: 06/21/24 1443    Order Status: Completed Specimen: Blood from Peripheral, Antecubital, Right Updated: 06/24/24 1812     Blood Culture, Routine No Growth to date      No Growth to date      No Growth to date      No Growth to date          Recent Lab Results  (Last 5 results in the past 24 hours)        06/25/24  1311   06/25/24  1058   06/25/24  0422   06/24/24 2029 06/24/24  1723        Albumin     1.7   1.8         ALP     76           ALT     7           Anion Gap     15   19         Aniso     Slight           AST     25           Bands     1.0           Baso #     CANCELED  Comment: Result canceled by the ancillary.           Basophil %      0.0           BILIRUBIN TOTAL     0.2  Comment: For infants and newborns, interpretation of results should be based  on gestational age, weight and in agreement with clinical  observations.    Premature Infant recommended reference ranges:  Up to 24 hours.............<8.0 mg/dL  Up to 48 hours............<12.0 mg/dL  3-5 days..................<15.0 mg/dL  6-29 days.................<15.0 mg/dL             BUN     81   84         Calcium     6.9  Comment: *Critical value notification by ADR with confirmation of receipt to  Suzette Rodriges RN at  Date 6/25/24_Time_5:06am     7.1         Calcium Level Ionized   0.91             Chloride     100   98         CO2     24   23         Creatinine     3.8   4.0         Cytomegalovirus DNA     CMV DNA detected and quantified           Cytomegalovirus Log (IU/mL)     2.09  Comment: This procedure utilizes a real-time polymerase chain reaction test  from [employer]. The amplification target is a conserved region  of the CMV genome. The lower limit of quantitation is < 30 IU/mL  (<1.50 Log IU/mL)and the upper limit of quantitation is 100 million  IU/mL 8.0 Log IU/mL).    Specimens reported as Detected but < 30 IU/mL contain detectable  levels of Cytomegalovirus DNA but the viral load is below the limit of  quantitation. A Not Detected result does not rule out CMV infection,   clinical correlation is  recommended.             Cytomegalovirus PCR, Quant     122           Differential Method     Manual           eGFR     11.0   10.3         Eos #     CANCELED  Comment: Result canceled by the ancillary.           Eos %     0.0           Debi-Barr Virus DNA     EBV DNA detected but not quantified           Debi-Barr Virus PCR, Quant     <LLOQ           Glucose     128   137         Gran %     96.0           Hematocrit     22.9           Hemoglobin     8.2           Hypo     Occasional           Immature Grans (Abs)     CANCELED  Comment: Mild elevation in immature  granulocytes is non specific and   can be seen in a variety of conditions including stress response,   acute inflammation, trauma and pregnancy. Correlation with other   laboratory and clinical findings is essential.    Result canceled by the ancillary.             Immature Granulocytes     CANCELED  Comment: Result canceled by the ancillary.           Lymph #     CANCELED  Comment: Result canceled by the ancillary.           Lymph %     3.0           Magnesium      1.6           MCH     32.2           MCHC     35.8           MCV     90           Mono #     CANCELED  Comment: Result canceled by the ancillary.           Mono %     0.0           MPV     11.2           nRBC     0           Ovalocytes     Occasional           Phosphorus Level     6.3   6.2         Platelet Count     151           POCT Glucose 146         139       Poikilocytosis     Slight           Poly     Occasional           Potassium     3.1   3.3         PROTEIN TOTAL     4.7           PTH   255.1             RBC     2.55           RDW     20.9           Sodium     139   140         Spherocytes     Occasional           TSH     0.901           Vitamin D   10  Comment: Vitamin D deficiency.........<10 ng/mL                              Vitamin D insufficiency......10-29 ng/mL       Vitamin D sufficiency........> or equal to 30 ng/mL  Vitamin D toxicity............>100 ng/mL               WBC     9.72                                  Significant Imaging:     Imaging Results    None

## 2024-06-25 NOTE — PLAN OF CARE
Turn q2h this shift.  No falls or injuries reported this shift.  Tolerating IV fluids well this shift.  Cr trending down this shift. Yuen draining to gravity with minimal output.  Pt remains NSR on cardiac monitoring this shift.

## 2024-06-25 NOTE — NURSING
Plan of care reviewed on am rounds when daughter present.Afrebrile,vss tele SR. Awake at intervals, responds to name called but not able to answer any orientation questions.Bilat UE mittens in place with active order as patient has pulled out 2 peripheral IVs. WBC 9  h/h stable K 3.1 ( replaced ) Mg 1.6 ( replaced ) as well as Ca 6.9. LR in progress at 125 cc/hr, urine  output adequate( rueda ) Am meds crushed in applesauce, attempted to feed meals but not eating. Topical steroid cream and IV steroids continued. Gen rash/ skin [peeling noted. Wound care continued to skin excoriation and right knee per wound care orders. Waffle mattress on bed, turned/repositioned every 2 hours.PT consult answered ( see note ) Daughter here part of shift and communicating with med/consult teams.

## 2024-06-25 NOTE — ASSESSMENT & PLAN NOTE
.Pt had a recent TKA, fall after OR and femur fracture, requiring ORIF followed by wound infection and complex healing course. Bone cx alpha hemolytic strep. Required ABX therapy guided by ID c/o Linezolid followed by recent initiation of Zosyn therapy. No off zosyn.     Plan:  Ortho consultation; recs appreciated  ID consult, pending recs

## 2024-06-25 NOTE — PLAN OF CARE
Problem: Occupational Therapy  Goal: Occupational Therapy Goal  Description: Goals to be met by: 7/2/2024     Patient will increase functional independence with ADLs by performing:    UE Dressing with Moderate Assistance.  LE Dressing with Maximum Assistance.  Grooming while EOB with Maximum Assistance.  Toileting from bedside commode with Maximum Assistance for hygiene and clothing management.   Toilet transfer to bedside commode with Maximum Assistance.    DME Justifications     Hospital Bed ·       Patient requires a hospital bed for positioning of the body in ways that are not feasible with an ordinary bed. The patient requires special positioning for pain relief, limited mobility, and/or being unable to independently make changes in body position without the use of a hospital bed. Pillows and wedges will not be adequate for resolving these positional issues.         Outcome: Progressing

## 2024-06-25 NOTE — ASSESSMENT & PLAN NOTE
Concern for DRESS clinically, started on IV and topical steroids. DRESS confirmed on pathologyID recommends to continue holding antibiotics in the setting of DRESS.     - antihistamine, topical steroid  - IV methylpred, taper to be determined based off the following labs (each disease state affects treatement course) - TSH, HHV6, CMV, EMV, and HHV7 unable to be ordered   - derm consulted for drug reaction and topical corticosteroid, appreciate recs  - triamcinolone daily

## 2024-06-25 NOTE — SUBJECTIVE & OBJECTIVE
Interval History: Renal functions improving, low vit D and hypocalcemia.  Still disoriented and drowsy.  Review of patient's allergies indicates:   Allergen Reactions    Zosyn [piperacillin-tazobactam] Hives    Gabapentin Hallucinations     Current Facility-Administered Medications   Medication Frequency    atorvastatin tablet 10 mg Daily    cetirizine tablet 5 mg Daily    dextrose 10% bolus 125 mL 125 mL PRN    dextrose 10% bolus 250 mL 250 mL PRN    glucagon (human recombinant) injection 1 mg PRN    glucose chewable tablet 16 g PRN    glucose chewable tablet 24 g PRN    heparin (porcine) injection 5,000 Units Q8H    hydrOXYzine HCL tablet 25 mg TID PRN    insulin aspart U-100 pen 0-5 Units QID (AC + HS) PRN    lactated ringers infusion Continuous    magnesium sulfate in dextrose IVPB (premix) 1 g Once    methylPREDNISolone sodium succinate injection 80 mg Q8H    naloxone 0.4 mg/mL injection 0.02 mg PRN    QUEtiapine tablet 25 mg QHS    sodium chloride 0.9% flush 10 mL Q12H PRN    sodium chloride 0.9% flush 10 mL Q6H    And    sodium chloride 0.9% flush 10 mL PRN    triamcinolone acetonide 0.1% cream BID       Objective:     Vital Signs (Most Recent):  Temp: 97.1 °F (36.2 °C) (06/25/24 1132)  Pulse: 66 (06/25/24 1132)  Resp: 17 (06/25/24 1132)  BP: (!) 142/77 (06/25/24 1132)  SpO2: 100 % (06/25/24 1132) Vital Signs (24h Range):  Temp:  [97 °F (36.1 °C)-97.8 °F (36.6 °C)] 97.1 °F (36.2 °C)  Pulse:  [56-88] 66  Resp:  [16-20] 17  SpO2:  [77 %-100 %] 100 %  BP: (107-188)/(46-77) 142/77     Weight: 84.4 kg (186 lb) (06/21/24 1950)  Body mass index is 34.02 kg/m².  Body surface area is 1.92 meters squared.    I/O last 3 completed shifts:  In: 5104 [P.O.:30; I.V.:4574; IV Piggyback:500]  Out: 1070 [Urine:1070]     Physical Exam  Constitutional:       Comments: Sleepy, disoriented   Pulmonary:      Effort: Pulmonary effort is normal. No respiratory distress.      Breath sounds: No wheezing, rhonchi or rales.    Musculoskeletal:      Right lower leg: No edema.      Left lower leg: No edema.   Skin:     General: Skin is warm.   Neurological:      Mental Status: She is disoriented.          Significant Labs:  BMP:   Recent Labs   Lab 06/25/24  0422   *      K 3.1*      CO2 24   BUN 81*   CREATININE 3.8*   CALCIUM 6.9*   MG 1.6     CBC:   Recent Labs   Lab 06/25/24 0422   WBC 9.72   RBC 2.55*   HGB 8.2*   HCT 22.9*      MCV 90   MCH 32.2*   MCHC 35.8        Significant Imaging:  Labs: Reviewed  ECG: Reviewed  X-Ray: Reviewed  US: Reviewed   urinary retention

## 2024-06-25 NOTE — ASSESSMENT & PLAN NOTE
Urine microscopy showed course granular cast consistent with ATN. The sample was heavily populated with squamous cells, making identification of WBCs or RBCs difficult however urine dipstick done in our office today did not reveal any WBCs or RBCs which contrast the urine dipstick preformed on arrival. Due to overpopulation of squamous cells in the urine sample, AIN cannot be fully excluded at this time.     Recommendations;  -Hypocalcemia should be addressed (P/O or I/V supplements).  -Renal functions are improving, no need of RRT at this time.  -Avoid nephrotoxic agents (IV contrast, NSAID's, gadolinium contrast, PPI's) if able.   -Maintain MAP above 65 mmHg.   -Strict I's and O's  -Daily renal function panel  -Renally dose all medications

## 2024-06-25 NOTE — PROGRESS NOTES
[facility]  Infectious Disease  Progress Note    Patient Name: Dahlia Spence  MRN: [id number]  Admission Date: 6/21/2024  Length of Stay: 3 days  Attending Physician: Dwight Granados MD  Primary Care Provider: Frandy Roe MD    Isolation Status: No active isolations  Assessment/Plan:      Oncology  DRESS syndrome    88-year-old female with history of right TKA  c/b distal femur periprosthetic fracture secondary to a fall s/p right knee revision of TKA with distal femoral replacement on 4/29 (completed empiric Linezolid > Daptomycin x 10 days through 5/11). Course complicated by right knee wound dehiscence s/p surgical I&D 5/16 followed by skin graft placement on 5/23. Cultures 5/16 grew Proteus and Pseudomonas. There were concerns for possible hardware involvement (Op note with dehiscence of inferior aspect of the capsular repair). Patient was discharged on six weeks of IV Zosyn from I&D (EOC 6/27). She followed up with ID on 6/6 and knee graft noted to be necrotic with slough present.  Wound cultures  grew CONS and alpha hemalytic strep. It is unclear if patient was started on a new antibiotic since this time, awaiting records from case mgmt to clarify.     Patient presented with diffuse rash and tongue swelling. In the ED: afebrile and VSS. WBC 20K, eosinophilia noted,K 3, Cr 5, , ESR 61. She was given IVF, cefepime, antihistamine, steroids in ED.  ID consulted for recommendations.    Now, pt afebrile. HDS. Without leukocytosis. Remains anemic. Platelets 181. Sed rate 61. Dermatology following, recommended to stop unnecessary medications, including antibiotics. Pt was started on steroids. Punch biopsy obtained, pathology noting drug eruption with eosinophilia and systemic symptoms (DRESS), though negative for SJS. Recommended peripheral smear, PCR for HHV6, HHV7, EBV, CMV.     Ortho consulted. 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 Recommending  follow up with ortho team at .       Recommendations  Okay to continue to hold antibiotics. Awaiting records from case mgmt to further assess if she has been on any new medications/antibiotics other than Zosyn  Pt with limited antibiotic options. unable to rule out chronic PJI at this time. Will need further imaging. Primary team discussing with ortho surgery. Will follow for recs.   Continue wound care. Optimize nutrition.   Discussed plan with ID staff. ID will follow closely.         Thank you for your consult. I will follow-up with patient. Please contact us if you have any additional questions.    Silverio Newell, ALAN  Infectious Disease  [facility]    Subjective:     Principal Problem:<principal problem not specified>    HPI: Dahlia Spence is an 88-year-old female with past medical history of hypertension, hyperlipidemia, osteoarthritis, DM, PVD who presented to [facility] 6/21 for a diffuse skin rash.     Patient has a history of right TKA 4/22/2024. She was working with physical therapy postoperatively and sustained a distal femur periprosthetic fracture of the right knee secondary to a fall. Due to proximity with knee arthroplasty, she underwent a right knee revision of TKA with distal femoral replacement on 4/29/2024. Due to increased risk of post op infection, ID was consulted and recommended 10 days of empiric antibiotics. She was initially on Linezolid (d/c due to thrombocytopenia) and completed course with Daptomycin (EOC 5/11). Patient was discharged to a nursing facility though represented 05/15/2024 with wound dehiscence. On 5/16 she underwent surgical I&D followed by muscle flap and skin graft placement on 5/23. Cultures 5/16 grew pan susceptible Proteus and Pseudomonas. There were concerns for possible hardware involvement (Op note with dehiscence of inferior aspect of the capsular repair). ID recommended six weeks of IV Zosyn from I&D (EOC 6/27).    Patient followed up with ID  on 6/6. Her wound had some evidence of slough and necrosis and she underwent light debridement. Wound cultures  grew CONS and alpha hemalytic strep. Patient's daugher is at bedside and reports she was suppose to start a new oral antibiotic but doesn't believe the patient started in. Over the last week the patient has developed a full body rash and AMS. She was brought to the ED after complaining of tongue swelling.     In the ED: afebrile and VSS. WBC 20K, eosinophilia noted,K 3, Cr 5, , ESR 61. She was given IVF, Cefepime, antihistamine, steroids.  ID consulted for recommendations.  Interval History:     Afebrile. No white count. Rash improving. Remains disoriented.      Review of Systems   Unable to perform ROS: Other (disoriented)     Objective:     Vital Signs (Most Recent):  Temp: 97.1 °F (36.2 °C) (06/25/24 1132)  Pulse: 66 (06/25/24 1132)  Resp: 17 (06/25/24 1132)  BP: (!) 142/77 (06/25/24 1132)  SpO2: 100 % (06/25/24 1132) Vital Signs (24h Range):  Temp:  [97 °F (36.1 °C)-97.8 °F (36.6 °C)] 97.1 °F (36.2 °C)  Pulse:  [62-88] 66  Resp:  [16-20] 17  SpO2:  [77 %-100 %] 100 %  BP: (107-188)/(46-77) 142/77     Weight: 84.4 kg (186 lb)  Body mass index is 34.02 kg/m².    Estimated Creatinine Clearance: 10.5 mL/min (A) (based on SCr of 3.8 mg/dL (H)).     Physical Exam  Vitals and nursing note reviewed.   Constitutional:       General: She is not in acute distress.     Appearance: She is well-developed. She is obese. She is ill-appearing.   HENT:      Head: Normocephalic and atraumatic.      Right Ear: External ear normal.      Left Ear: External ear normal.      Nose: Nose normal.   Eyes:      General: No scleral icterus.        Right eye: No discharge.         Left eye: No discharge.      Extraocular Movements: Extraocular movements intact.      Conjunctiva/sclera: Conjunctivae normal.   Cardiovascular:      Rate and Rhythm: Normal rate and regular rhythm.      Heart sounds: Normal heart sounds. No murmur  heard.  Pulmonary:      Effort: Pulmonary effort is normal. No respiratory distress.      Breath sounds: Normal breath sounds. No stridor. No wheezing.   Abdominal:      General: Abdomen is flat. There is no distension.      Palpations: Abdomen is soft.      Tenderness: There is no abdominal tenderness.   Musculoskeletal:         General: Tenderness and signs of injury present. Normal range of motion.      Cervical back: Normal range of motion.   Skin:     General: Skin is warm and dry.      Findings: Erythema and rash present.      Comments: Diffuse erythematous rash with skin peeling/desquamation present. See photos below  Necrotic fibrinous knee graft   Neurological:      Mental Status: She is alert. She is disoriented.      Comments: Alert but disoriented.        6/24/24 updated photos:                                  Previous:                                                              Significant Labs: CBC:   Recent Labs   Lab 06/24/24  0556 06/25/24 0422   WBC 11.16 9.72   HGB 9.0* 8.2*   HCT 25.2* 22.9*    151     CMP:   Recent Labs   Lab 06/24/24  0556 06/24/24 2029 06/25/24  0422    140 139   K 3.7 3.3* 3.1*    98 100   CO2 22* 23 24   * 137* 128*   BUN 85* 84* 81*   CREATININE 4.3* 4.0* 3.8*   CALCIUM 7.0* 7.1* 6.9*   PROT 5.2*  --  4.7*   ALBUMIN 1.7* 1.8* 1.7*   BILITOT 0.2  --  0.2   ALKPHOS 67  --  76   AST 16  --  25   ALT 6*  --  7*   ANIONGAP 18* 19* 15     Microbiology Results (last 7 days)       Procedure Component Value Units Date/Time    Blood culture #1 **CANNOT BE ORDERED STAT** [[id number]] Collected: 06/21/24 1443    Order Status: Completed Specimen: Blood from Peripheral, Antecubital, Right Updated: 06/24/24 1812     Blood Culture, Routine No Growth to date      No Growth to date      No Growth to date      No Growth to date    Blood culture #2 **CANNOT BE ORDERED STAT** [[id number]] Collected: 06/21/24 1443    Order Status: Completed Specimen: Blood from  Peripheral, Antecubital, Right Updated: 06/24/24 1812     Blood Culture, Routine No Growth to date      No Growth to date      No Growth to date      No Growth to date          Recent Lab Results  (Last 5 results in the past 24 hours)        06/25/24  1311   06/25/24  1058   06/25/24  0422   06/24/24 2029 06/24/24  1723        Albumin     1.7   1.8         ALP     76           ALT     7           Anion Gap     15   19         Aniso     Slight           AST     25           Bands     1.0           Baso #     CANCELED  Comment: Result canceled by the ancillary.           Basophil %     0.0           BILIRUBIN TOTAL     0.2  Comment: For infants and newborns, interpretation of results should be based  on gestational age, weight and in agreement with clinical  observations.    Premature Infant recommended reference ranges:  Up to 24 hours.............<8.0 mg/dL  Up to 48 hours............<12.0 mg/dL  3-5 days..................<15.0 mg/dL  6-29 days.................<15.0 mg/dL             BUN     81   84         Calcium     6.9  Comment: *Critical value notification by ADR with confirmation of receipt to  Suzette Rodriges RN at  Date 6/25/24_Time_5:06am     7.1         Calcium Level Ionized   0.91             Chloride     100   98         CO2     24   23         Creatinine     3.8   4.0         Cytomegalovirus DNA     CMV DNA detected and quantified           Cytomegalovirus Log (IU/mL)     2.09  Comment: This procedure utilizes a real-time polymerase chain reaction test  from [employer]. The amplification target is a conserved region  of the CMV genome. The lower limit of quantitation is < 30 IU/mL  (<1.50 Log IU/mL)and the upper limit of quantitation is 100 million  IU/mL 8.0 Log IU/mL).    Specimens reported as Detected but < 30 IU/mL contain detectable  levels of Cytomegalovirus DNA but the viral load is below the limit of  quantitation. A Not Detected result does not rule out CMV infection,   clinical  correlation is  recommended.             Cytomegalovirus PCR, Quant     122           Differential Method     Manual           eGFR     11.0   10.3         Eos #     CANCELED  Comment: Result canceled by the ancillary.           Eos %     0.0           Debi-Barr Virus DNA     EBV DNA detected but not quantified           Debi-Barr Virus PCR, Quant     <LLOQ           Glucose     128   137         Gran %     96.0           Hematocrit     22.9           Hemoglobin     8.2           Hypo     Occasional           Immature Grans (Abs)     CANCELED  Comment: Mild elevation in immature granulocytes is non specific and   can be seen in a variety of conditions including stress response,   acute inflammation, trauma and pregnancy. Correlation with other   laboratory and clinical findings is essential.    Result canceled by the ancillary.             Immature Granulocytes     CANCELED  Comment: Result canceled by the ancillary.           Lymph #     CANCELED  Comment: Result canceled by the ancillary.           Lymph %     3.0           Magnesium      1.6           MCH     32.2           MCHC     35.8           MCV     90           Mono #     CANCELED  Comment: Result canceled by the ancillary.           Mono %     0.0           MPV     11.2           nRBC     0           Ovalocytes     Occasional           Phosphorus Level     6.3   6.2         Platelet Count     151           POCT Glucose 146         139       Poikilocytosis     Slight           Poly     Occasional           Potassium     3.1   3.3         PROTEIN TOTAL     4.7           PTH   255.1             RBC     2.55           RDW     20.9           Sodium     139   140         Spherocytes     Occasional           TSH     0.901           Vitamin D   10  Comment: Vitamin D deficiency.........<10 ng/mL                              Vitamin D insufficiency......10-29 ng/mL       Vitamin D sufficiency........> or equal to 30 ng/mL  Vitamin D toxicity............>100  ng/mL               WBC     9.72                                  Significant Imaging:     Imaging Results    None

## 2024-06-25 NOTE — PROGRESS NOTES
[facility]  Progress Note    Patient Name: Dahlia Spence  MRN: [id number]  Patient Class: IP- Inpatient   Admission Date: 6/21/2024  Length of Stay: 3 days  Attending Physician: Dwight Granados MD  Primary Care Provider: Frandy Roe MD        Subjective:     Principal Problem:<principal problem not specified>        HPI:  88 YOF w/CKD3, T2DM, COPD, PVD 2/2 to DM, HTN, HLD, LBP, OA who presented for a skin rash. The patient had a recent I&D (05/16/2024) for her R knee wound and started zosyn. Then 7 days ago she developed full body hives with rash and tongue swelling. Family reports AMS and is not at her baseline.     Recent history: right distal femur replacement on 04/29/2024, then I&D of RLE 5/16/24, then dehiscence of the right TKA and underwent a right gastroc flap, split thickness skin graft, and wound VAC on 05/23/2024. Then the patient had a post op infection and had a 10 day course of linezolid to daptomycin. Then she had a second post op infection treated with a 6 week course of zosyn on  05/30/2024 and was seen by ID.     In the ED: patient received fluid resuscitation and IV ceftriaxone. She exhibited signs of minimal skin desqaumation amidst normal vital signs.  WBC 20.56  HGB 12.6     K 3.0  CRT 5.0 (baseline 1.5)    Overview/Hospital Course:  88 YOF w/CKD3, T2DM, COPD, PVD 2/2 to DM, HTN, HLD, LBP, OA who presented for a skin rash.Pt had a recent TKA, fall after OR and femur fracture, requiring ORIF followed by wound infection and complex healing course. Bone cx alpha hemolytic strep. Required ABX therapy guided by ID c/o Linezolid followed by recent initiation of Zosyn therapy. Developed marked drug eruption over the course of the last week. Presents with severe total body eruption, AMS and profound JOSÉ. Wound care, ID, ortho, derm, and nephro following. Concern for DRESS clinically, started on IV and topical steroids. DRESS confirmed on  pathology. Per ortho the patient is too sick for acute interventions, continue wound care only. ID recommends to continue holding antibiotics in the setting of DRESS. Developed an JOSÉ this admission which is likely 2/2 to prerenal, started on HCO3 gtt and transitioned to continuous LR. Started nightly seroquel for AMS 2/2 possibly to delirium from acute illness (neg CTH). Electrolytes being repleted and PT/OT onboard for assessment and management.      Interval History: patient's rash is improving    Review of Systems  Objective:     Vital Signs (Most Recent):  Temp: 97.1 °F (36.2 °C) (06/25/24 1132)  Pulse: 66 (06/25/24 1132)  Resp: 17 (06/25/24 1132)  BP: (!) 142/77 (06/25/24 1132)  SpO2: 100 % (06/25/24 1132) Vital Signs (24h Range):  Temp:  [97 °F (36.1 °C)-97.8 °F (36.6 °C)] 97.1 °F (36.2 °C)  Pulse:  [56-88] 66  Resp:  [16-20] 17  SpO2:  [77 %-100 %] 100 %  BP: (107-188)/(46-77) 142/77     Weight: 84.4 kg (186 lb)  Body mass index is 34.02 kg/m².    Intake/Output Summary (Last 24 hours) at 6/25/2024 1431  Last data filed at 6/25/2024 1055  Gross per 24 hour   Intake 2277.44 ml   Output 700 ml   Net 1577.44 ml         Physical Exam  Constitutional:       General: She is not in acute distress.     Appearance: She is not ill-appearing, toxic-appearing or diaphoretic.   HENT:      Head: Normocephalic and atraumatic.   Cardiovascular:      Rate and Rhythm: Normal rate and regular rhythm.      Pulses: Normal pulses.      Heart sounds: Normal heart sounds.   Pulmonary:      Effort: Pulmonary effort is normal. No respiratory distress.      Breath sounds: No wheezing.   Abdominal:      General: Bowel sounds are normal.      Palpations: Abdomen is soft.   Musculoskeletal:         General: Swelling (right knee) and tenderness present.   Skin:     Capillary Refill: Capillary refill takes less than 2 seconds.      Findings: Erythema and rash present.   Neurological:      Mental Status: She is disoriented.              Significant Labs: All pertinent labs within the past 24 hours have been reviewed.    Significant Imaging: I have reviewed all pertinent imaging results/findings within the past 24 hours.    Assessment/Plan:      Weakness  PT/OT consult sent for assessment and post-acute care       Acidosis  HAGMA 2/2 to pre-renal JOSÉ    - was on HCO3 gtt, transitioned to LR   - CMP daily      Hypokalemia    Lab Results   Component Value Date    K 3.1 (L) 06/25/2024   . Will continue potassium replacement per protocol and recheck repeat levels after replacement completed.     Metabolic acidosis, increased anion gap  See acidosis      JOSÉ (acute kidney injury)  Patient with acute kidney injury/acute renal failure likely due to pre-renal azotemia due to dehydration JOSÉ is currently improving. Baseline creatinine  1.5  - Labs reviewed- Renal function/electrolytes with Estimated Creatinine Clearance: 10.5 mL/min (A) (based on SCr of 3.8 mg/dL (H)). according to latest data. Monitor urine output and serial BMP and adjust therapy as needed. Avoid nephrotoxins and renally dose meds for GFR listed above.    Open wound of right knee s/p R distal femoral replacement, I&D, and gastrocnemius flap  .Pt had a recent TKA, fall after OR and femur fracture, requiring ORIF followed by wound infection and complex healing course. Bone cx alpha hemolytic strep. Required ABX therapy guided by ID c/o Linezolid followed by recent initiation of Zosyn therapy. No off zosyn.     Plan:  Ortho consultation; recs appreciated  ID consult, pending recs        DRESS syndrome  Concern for DRESS clinically, started on IV and topical steroids. DRESS confirmed on pathologyID recommends to continue holding antibiotics in the setting of DRESS.     - antihistamine, topical steroid  - IV methylpred, taper to be determined based off the following labs (each disease state affects treatement course) - TSH, HHV6, CMV, EMV, and HHV7 unable to be ordered   - derm consulted  for drug reaction and topical corticosteroid, appreciate recs  - triamcinolone daily         Type 2 diabetes mellitus with other circulatory complications  Low dose ssi as needed prn  HBA1C      Chronic obstructive pulmonary disease, unspecified  Supplemental oxygen  Duo-nebs  Target SPO2 >88%    Chronic kidney disease, stage III (moderate)  Patient's CRT today is 5.0 >>5.4  Baseline in the system is 1.5  Acidosis with ph 7.1  HCO3 is 9  Phos 9    Plan:  - Nephrology consult, now  - urine studies pending     Benign essential hypertension  Chronic, controlled. Latest blood pressure and vitals reviewed-     Temp:  [97 °F (36.1 °C)-97.5 °F (36.4 °C)]   Pulse:  [56-87]   Resp:  [16-19]   BP: (111-168)/(58-87)   SpO2:  [95 %-99 %] .   Home meds for hypertension were reviewed and noted below.   Hypertension Medications               lisinopriL-hydrochlorothiazide (PRINZIDE,ZESTORETIC) 20-12.5 mg per tablet TAKE 1 TABLET BY MOUTH EVERY DAY            While in the hospital, will manage blood pressure as follows; Adjust home antihypertensive regimen as follows- HOLD    Will utilize p.r.n. blood pressure medication only if patient's blood pressure greater than 180/110 and she develops symptoms such as worsening chest pain or shortness of breath.      VTE Risk Mitigation (From admission, onward)           Ordered     heparin (porcine) injection 5,000 Units  Every 8 hours         06/21/24 1803     IP VTE HIGH RISK PATIENT  Once         06/21/24 1803     Place sequential compression device  Until discontinued         06/21/24 1803                    Discharge Planning   ACE: 6/28/2024     Code Status: DNR   Is the patient medically ready for discharge?:     Reason for patient still in hospital (select all that apply): Treatment  Discharge Plan A: Skilled Nursing Facility                  Oliva Harp MD  Department of Hospital Medicine   [facility]

## 2024-06-25 NOTE — ASSESSMENT & PLAN NOTE
Lab Results   Component Value Date    K 3.1 (L) 06/25/2024   . Will continue potassium replacement per protocol and recheck repeat levels after replacement completed.

## 2024-06-25 NOTE — PT/OT/SLP EVAL
"Occupational Therapy   Evaluation    Name: aDhlia Spence  MRN: [id number]  Admitting Diagnosis: <principal problem not specified>  Recent Surgery: * No surgery found *      Recommendations:     Discharge Recommendations: Moderate Intensity Therapy  Discharge Equipment Recommendations:  hospital bed  Barriers to discharge:  None    Assessment:     Dahlia Spence is a 88 y.o. female with a medical diagnosis of <principal problem not specified>.  She presents with the following performance deficits affecting function: weakness, impaired endurance, impaired self care skills, impaired functional mobility, gait instability, decreased lower extremity function, pain, decreased safety awareness, impaired balance, impaired skin, impaired cardiopulmonary response to activity, decreased upper extremity function, impaired cognition, decreased ROM. Pt tolerated session fairly poorly, was able to respond to questions intermittently but mostly confused throughout the session. Pt unable to open eyes 2/2 to skin rash and actively resistant to bed mobility upon attempt.     Rehab Prognosis: Fair; patient would benefit from acute skilled OT services to address these deficits and reach maximum level of function.       Plan:     Patient to be seen 4 x/week to address the above listed problems via self-care/home management, therapeutic activities, therapeutic exercises  Plan of Care Expires: 07/25/24  Plan of Care Reviewed with: patient, daughter    Subjective     Chief Complaint: none verbalized  Patient/Family Comments/goals: "I want to take her home." -daughter at bedside    Occupational Profile:  Living Environment: pt came from [facility]; per daughter, prior to R TKA in April, pt lived alone  Previous level of function: prior to R TKA in April, Mod I c/ RW; assist c/ bathing  Roles and Routines: mother  Equipment Used at Home:  (pt came from [facility])  Assistance upon Discharge: daughter; per daughter report, she wants " to take pt home    Pain/Comfort:  Pain Rating 1:  (unable to rate)  Pain Rating Post-Intervention 1:  (unable to rate)    Patients cultural, spiritual, Temple conflicts given the current situation: no    Objective:     Communicated with: RN prior to session.  Patient found supine with pressure relief boots, peripheral IV, telemetry, rueda catheter, bed alarm, restraints, knee immobilizer upon OT entry to room.    General Precautions: Standard, fall  Orthopedic Precautions: RLE WB status/ knee immobilizer wear schedule unclear at this time, sent message to ortho  Braces: Knee immobilizer  Respiratory Status: Room air    Occupational Performance:    Bed Mobility:    Patient completed Rolling/Turning to Left with  total assistance  Upon rolling on side, pt resistant to further mobility, actively attempting to lay down    Functional Mobility/Transfers:  Not attempted 2/2 to pt safety, weakness, AMS    Activities of Daily Living:  Grooming: total assistance washed face at bed level; pt not following commands to grasp washcloth, bring to face; however pt able to use LUE to scratch face  Lower Body Dressing: total assistance donned socks at bed level  Toileting: pt c/ rueda; pt not needing to have BM at this time      Cognitive/Visual Perceptual:  Cognitive/Psychosocial Skills:     -       Oriented to: Person   -       Follows Commands/attention:Inattentive and Easily distracted  -       Safety awareness/insight to disability: impaired     Physical Exam:  Balance:    -       unable to assess  Upper Extremity Range of Motion:     -       Right Upper Extremity: not tested 2/2 to command following; appears WFL based on observation  -       Left Upper Extremity: not tested 2/ to command following, appears WFL based on observation  Upper Extremity Strength:    -       Right Upper Extremity: unable to to test 2/2 to command following  -       Left Upper Extremity: same as RUE   Strength:    -       Right Upper Extremity:  decreased  strength  -       Left Upper Extremity: decreased  strength  Fine Motor Coordination:    -       Impaired  Left hand thumb/finger opposition skills  , Right hand thumb/finger opposition skills  , Left hand, manipulation of objects  , and Right hand, manipulation of objects      AMPAC 6 Click ADL:  AMPAC Total Score: 6    Treatment & Education:  Pt edu on role of OT, POC, safety when performing self care tasks , benefit of performing OOB activity, and safety when performing functional transfers and mobility.  - White board updated  - Self care tasks completed-- as noted above      Patient left supine with all lines intact, call button in reach, bed alarm on, RN notified, and daughter present    GOALS:   Multidisciplinary Problems       Occupational Therapy Goals          Problem: Occupational Therapy    Goal Priority Disciplines Outcome Interventions   Occupational Therapy Goal     OT, PT/OT Progressing    Description: Goals to be met by: 7/2/2024     Patient will increase functional independence with ADLs by performing:    UE Dressing with Moderate Assistance.  LE Dressing with Maximum Assistance.  Grooming while EOB with Maximum Assistance.  Toileting from bedside commode with Maximum Assistance for hygiene and clothing management.   Toilet transfer to bedside commode with Maximum Assistance.    DME Justifications     Hospital Bed ·       Patient requires a hospital bed for positioning of the body in ways that are not feasible with an ordinary bed. The patient requires special positioning for pain relief, limited mobility, and/or being unable to independently make changes in body position without the use of a hospital bed. Pillows and wedges will not be adequate for resolving these positional issues.                              History:     Past Medical History:   Diagnosis Date    Arthritis     Gout     High cholesterol     Hypertension          Past Surgical History:   Procedure Laterality  Date    ANKLE SURGERY      CHOLECYSTECTOMY      HYSTERECTOMY         Time Tracking:     OT Date of Treatment: 06/25/24  OT Start Time: 1132  OT Stop Time: 1152  OT Total Time (min): 20 min    Billable Minutes:Evaluation 8  Self Care/Home Management 12    6/25/2024

## 2024-06-25 NOTE — SUBJECTIVE & OBJECTIVE
Interval History: patient's rash is improving    Review of Systems  Objective:     Vital Signs (Most Recent):  Temp: 97.1 °F (36.2 °C) (06/25/24 1132)  Pulse: 66 (06/25/24 1132)  Resp: 17 (06/25/24 1132)  BP: (!) 142/77 (06/25/24 1132)  SpO2: 100 % (06/25/24 1132) Vital Signs (24h Range):  Temp:  [97 °F (36.1 °C)-97.8 °F (36.6 °C)] 97.1 °F (36.2 °C)  Pulse:  [56-88] 66  Resp:  [16-20] 17  SpO2:  [77 %-100 %] 100 %  BP: (107-188)/(46-77) 142/77     Weight: 84.4 kg (186 lb)  Body mass index is 34.02 kg/m².    Intake/Output Summary (Last 24 hours) at 6/25/2024 1431  Last data filed at 6/25/2024 1055  Gross per 24 hour   Intake 2277.44 ml   Output 700 ml   Net 1577.44 ml         Physical Exam  Constitutional:       General: She is not in acute distress.     Appearance: She is not ill-appearing, toxic-appearing or diaphoretic.   HENT:      Head: Normocephalic and atraumatic.   Cardiovascular:      Rate and Rhythm: Normal rate and regular rhythm.      Pulses: Normal pulses.      Heart sounds: Normal heart sounds.   Pulmonary:      Effort: Pulmonary effort is normal. No respiratory distress.      Breath sounds: No wheezing.   Abdominal:      General: Bowel sounds are normal.      Palpations: Abdomen is soft.   Musculoskeletal:         General: Swelling (right knee) and tenderness present.   Skin:     Capillary Refill: Capillary refill takes less than 2 seconds.      Findings: Erythema and rash present.   Neurological:      Mental Status: She is disoriented.             Significant Labs: All pertinent labs within the past 24 hours have been reviewed.    Significant Imaging: I have reviewed all pertinent imaging results/findings within the past 24 hours.

## 2024-06-25 NOTE — PLAN OF CARE
1:22 PM  The SW received the patient's records from [facility] and placed it in the patient's chart at bedside for review.    The SW notified the patient's care team via secure chat.     4:19 PM  The SW received a secure chat from the patient's NP requesting medical records for this patient. The SW contacted [facility] to inquire about obtaining this patient's medical records. The representative at [facility] informed the SW that the  patient's daughter had called and requested the patient be discharged from [facility]. The representative stated that this patient has been discharged from their system. The representative reported that this patient's medication list is not valid at this time.     The SW notified the patient's care NP via secure chat.     4:33 PM  The patient's NP informed the SW that she just needed the med list. The SW contacted [facility] to request the patient's med list. The representative informed the SW that she was going to fax over this patient's med list. The SW provided her name and fax number.         Tati Braden LCSW  Case Management [facility]

## 2024-06-25 NOTE — ASSESSMENT & PLAN NOTE
Patient with acute kidney injury/acute renal failure likely due to pre-renal azotemia due to dehydration JOSÉ is currently improving. Baseline creatinine  1.5  - Labs reviewed- Renal function/electrolytes with Estimated Creatinine Clearance: 10.5 mL/min (A) (based on SCr of 3.8 mg/dL (H)). according to latest data. Monitor urine output and serial BMP and adjust therapy as needed. Avoid nephrotoxins and renally dose meds for GFR listed above.

## 2024-06-26 PROBLEM — Q15.9 EYE ABNORMALITIES: Status: ACTIVE | Noted: 2024-06-26

## 2024-06-26 LAB
ALBUMIN SERPL BCP-MCNC: 1.6 G/DL (ref 3.5–5.2)
ANION GAP SERPL CALC-SCNC: 15 MMOL/L (ref 8–16)
BACTERIA BLD CULT: NORMAL
BACTERIA BLD CULT: NORMAL
BASOPHILS # BLD AUTO: 0.01 K/UL (ref 0–0.2)
BASOPHILS NFR BLD: 0.1 % (ref 0–1.9)
BUN SERPL-MCNC: 78 MG/DL (ref 8–23)
CA-I BLDV-SCNC: 1 MMOL/L (ref 1.06–1.42)
CALCIUM SERPL-MCNC: 7.3 MG/DL (ref 8.7–10.5)
CHLORIDE SERPL-SCNC: 102 MMOL/L (ref 95–110)
CO2 SERPL-SCNC: 23 MMOL/L (ref 23–29)
CREAT SERPL-MCNC: 3.2 MG/DL (ref 0.5–1.4)
DIFFERENTIAL METHOD BLD: ABNORMAL
EOSINOPHIL # BLD AUTO: 0 K/UL (ref 0–0.5)
EOSINOPHIL NFR BLD: 0 % (ref 0–8)
ERYTHROCYTE [DISTWIDTH] IN BLOOD BY AUTOMATED COUNT: 21.8 % (ref 11.5–14.5)
EST. GFR  (NO RACE VARIABLE): 13.5 ML/MIN/1.73 M^2
FERRITIN SERPL-MCNC: 3020 NG/ML (ref 20–300)
GLUCOSE SERPL-MCNC: 131 MG/DL (ref 70–110)
HCT VFR BLD AUTO: 21.3 % (ref 37–48.5)
HGB BLD-MCNC: 7.6 G/DL (ref 12–16)
IMM GRANULOCYTES # BLD AUTO: 0.42 K/UL (ref 0–0.04)
IMM GRANULOCYTES NFR BLD AUTO: 4.3 % (ref 0–0.5)
IRON SERPL-MCNC: 127 UG/DL (ref 30–160)
LYMPHOCYTES # BLD AUTO: 1.5 K/UL (ref 1–4.8)
LYMPHOCYTES NFR BLD: 15.6 % (ref 18–48)
MAGNESIUM SERPL-MCNC: 1.7 MG/DL (ref 1.6–2.6)
MCH RBC QN AUTO: 32.9 PG (ref 27–31)
MCHC RBC AUTO-ENTMCNC: 35.7 G/DL (ref 32–36)
MCV RBC AUTO: 92 FL (ref 82–98)
MONOCYTES # BLD AUTO: 0.2 K/UL (ref 0.3–1)
MONOCYTES NFR BLD: 2.2 % (ref 4–15)
NEUTROPHILS # BLD AUTO: 7.6 K/UL (ref 1.8–7.7)
NEUTROPHILS NFR BLD: 77.8 % (ref 38–73)
NRBC BLD-RTO: 0 /100 WBC
PHOSPHATE SERPL-MCNC: 6 MG/DL (ref 2.7–4.5)
PHOSPHATE SERPL-MCNC: 6 MG/DL (ref 2.7–4.5)
PLATELET # BLD AUTO: 133 K/UL (ref 150–450)
PMV BLD AUTO: 11 FL (ref 9.2–12.9)
POCT GLUCOSE: 150 MG/DL (ref 70–110)
POCT GLUCOSE: 159 MG/DL (ref 70–110)
POTASSIUM SERPL-SCNC: 4.1 MMOL/L (ref 3.5–5.1)
RBC # BLD AUTO: 2.31 M/UL (ref 4–5.4)
SATURATED IRON: 111 % (ref 20–50)
SODIUM SERPL-SCNC: 140 MMOL/L (ref 136–145)
TOTAL IRON BINDING CAPACITY: 114 UG/DL (ref 250–450)
TRANSFERRIN SERPL-MCNC: 77 MG/DL (ref 200–375)
TRANSFERRIN SERPL-MCNC: 77 MG/DL (ref 200–375)
VIT B12 SERPL-MCNC: 1508 PG/ML (ref 210–950)
WBC # BLD AUTO: 9.76 K/UL (ref 3.9–12.7)

## 2024-06-26 PROCEDURE — 84466 ASSAY OF TRANSFERRIN: CPT

## 2024-06-26 PROCEDURE — 97162 PT EVAL MOD COMPLEX 30 MIN: CPT

## 2024-06-26 PROCEDURE — 25000003 PHARM REV CODE 250: Mod: JZ,JG

## 2024-06-26 PROCEDURE — 82607 VITAMIN B-12: CPT

## 2024-06-26 PROCEDURE — 99233 SBSQ HOSP IP/OBS HIGH 50: CPT | Mod: ,,, | Performed by: INTERNAL MEDICINE

## 2024-06-26 PROCEDURE — 20600001 HC STEP DOWN PRIVATE ROOM

## 2024-06-26 PROCEDURE — 25000003 PHARM REV CODE 250

## 2024-06-26 PROCEDURE — 97112 NEUROMUSCULAR REEDUCATION: CPT | Mod: CO

## 2024-06-26 PROCEDURE — 82330 ASSAY OF CALCIUM: CPT | Performed by: INTERNAL MEDICINE

## 2024-06-26 PROCEDURE — 80069 RENAL FUNCTION PANEL: CPT

## 2024-06-26 PROCEDURE — 97530 THERAPEUTIC ACTIVITIES: CPT | Mod: CO

## 2024-06-26 PROCEDURE — 63600175 PHARM REV CODE 636 W HCPCS

## 2024-06-26 PROCEDURE — 83735 ASSAY OF MAGNESIUM: CPT

## 2024-06-26 PROCEDURE — 97530 THERAPEUTIC ACTIVITIES: CPT

## 2024-06-26 PROCEDURE — 85025 COMPLETE CBC W/AUTO DIFF WBC: CPT

## 2024-06-26 PROCEDURE — 82728 ASSAY OF FERRITIN: CPT

## 2024-06-26 PROCEDURE — 84100 ASSAY OF PHOSPHORUS: CPT

## 2024-06-26 PROCEDURE — 63600175 PHARM REV CODE 636 W HCPCS: Performed by: INTERNAL MEDICINE

## 2024-06-26 RX ORDER — MUPIROCIN 20 MG/G
OINTMENT TOPICAL 2 TIMES DAILY
Status: DISCONTINUED | OUTPATIENT
Start: 2024-06-26 | End: 2024-07-04 | Stop reason: HOSPADM

## 2024-06-26 RX ORDER — VALACYCLOVIR HYDROCHLORIDE 500 MG/1
1000 TABLET, FILM COATED ORAL DAILY
Status: COMPLETED | OUTPATIENT
Start: 2024-06-26 | End: 2024-06-30

## 2024-06-26 RX ORDER — CALCIUM CARBONATE 200(500)MG
500 TABLET,CHEWABLE ORAL DAILY
Status: DISCONTINUED | OUTPATIENT
Start: 2024-06-26 | End: 2024-07-04 | Stop reason: HOSPADM

## 2024-06-26 RX ORDER — LANOLIN ALCOHOL/MO/W.PET/CERES
1 CREAM (GRAM) TOPICAL DAILY
Status: DISCONTINUED | OUTPATIENT
Start: 2024-06-27 | End: 2024-06-26

## 2024-06-26 RX ORDER — CALCIUM GLUCONATE 20 MG/ML
2 INJECTION, SOLUTION INTRAVENOUS ONCE
Status: COMPLETED | OUTPATIENT
Start: 2024-06-26 | End: 2024-06-26

## 2024-06-26 RX ORDER — ERGOCALCIFEROL 1.25 MG/1
50000 CAPSULE ORAL
Status: DISCONTINUED | OUTPATIENT
Start: 2024-06-27 | End: 2024-07-01

## 2024-06-26 RX ORDER — ACETAMINOPHEN 325 MG/1
650 TABLET ORAL EVERY 6 HOURS PRN
Status: DISCONTINUED | OUTPATIENT
Start: 2024-06-26 | End: 2024-07-04 | Stop reason: HOSPADM

## 2024-06-26 RX ORDER — METHYLPREDNISOLONE SOD SUCC 125 MG
80 VIAL (EA) INJECTION DAILY
Status: DISCONTINUED | OUTPATIENT
Start: 2024-06-27 | End: 2024-07-01

## 2024-06-26 RX ADMIN — HEPARIN SODIUM 5000 UNITS: 5000 INJECTION INTRAVENOUS; SUBCUTANEOUS at 01:06

## 2024-06-26 RX ADMIN — CALCIUM CARBONATE (ANTACID) CHEW TAB 500 MG 500 MG: 500 CHEW TAB at 01:06

## 2024-06-26 RX ADMIN — Medication 1 TABLET: at 09:06

## 2024-06-26 RX ADMIN — POTASSIUM CHLORIDE 10 MEQ: 7.46 INJECTION, SOLUTION INTRAVENOUS at 01:06

## 2024-06-26 RX ADMIN — TRIAMCINOLONE ACETONIDE: 1 CREAM TOPICAL at 09:06

## 2024-06-26 RX ADMIN — VALACYCLOVIR HYDROCHLORIDE 1000 MG: 500 TABLET, FILM COATED ORAL at 09:06

## 2024-06-26 RX ADMIN — MUPIROCIN: 20 OINTMENT TOPICAL at 09:06

## 2024-06-26 RX ADMIN — QUETIAPINE FUMARATE 25 MG: 25 TABLET ORAL at 09:06

## 2024-06-26 RX ADMIN — HEPARIN SODIUM 5000 UNITS: 5000 INJECTION INTRAVENOUS; SUBCUTANEOUS at 09:06

## 2024-06-26 RX ADMIN — METHYLPREDNISOLONE SODIUM SUCCINATE 80 MG: 125 INJECTION, POWDER, FOR SOLUTION INTRAMUSCULAR; INTRAVENOUS at 01:06

## 2024-06-26 RX ADMIN — HEPARIN SODIUM 5000 UNITS: 5000 INJECTION INTRAVENOUS; SUBCUTANEOUS at 05:06

## 2024-06-26 RX ADMIN — ATORVASTATIN CALCIUM 10 MG: 10 TABLET, FILM COATED ORAL at 09:06

## 2024-06-26 RX ADMIN — POTASSIUM CHLORIDE 10 MEQ: 7.46 INJECTION, SOLUTION INTRAVENOUS at 03:06

## 2024-06-26 RX ADMIN — ACETAMINOPHEN 650 MG: 325 TABLET ORAL at 12:06

## 2024-06-26 RX ADMIN — SODIUM CHLORIDE, POTASSIUM CHLORIDE, SODIUM LACTATE AND CALCIUM CHLORIDE: 600; 310; 30; 20 INJECTION, SOLUTION INTRAVENOUS at 11:06

## 2024-06-26 RX ADMIN — MINERAL OIL AND WHITE PETROLATUM: 30; 940 OINTMENT OPHTHALMIC at 09:06

## 2024-06-26 RX ADMIN — CALCIUM GLUCONATE 2 G: 20 INJECTION, SOLUTION INTRAVENOUS at 01:06

## 2024-06-26 RX ADMIN — POTASSIUM CHLORIDE 10 MEQ: 7.46 INJECTION, SOLUTION INTRAVENOUS at 02:06

## 2024-06-26 RX ADMIN — CETIRIZINE HYDROCHLORIDE 5 MG: 5 TABLET, FILM COATED ORAL at 09:06

## 2024-06-26 RX ADMIN — METHYLPREDNISOLONE SODIUM SUCCINATE 80 MG: 125 INJECTION, POWDER, FOR SOLUTION INTRAMUSCULAR; INTRAVENOUS at 05:06

## 2024-06-26 RX ADMIN — POTASSIUM CHLORIDE 10 MEQ: 7.46 INJECTION, SOLUTION INTRAVENOUS at 12:06

## 2024-06-26 NOTE — PT/OT/SLP EVAL
Physical Therapy Evaluation  Co-evaluation with OT due to acuity of condition, level of skilled assist needed for assessment of safety with mobility.   Patient Name:  Dahlia Spence   MRN:  [id number]    Recommendations:     Discharge Recommendations: Moderate Intensity Therapy   Discharge Equipment Recommendations: hospital bed, lift device, wheelchair   Barriers to discharge: Inaccessible home and Decreased caregiver support    DME Justification:   Patient requires a hospital bed for positioning of the body in ways that are not feasible with an ordinary bed. The patient requires special positioning for pain relief, limited mobility, and/or being unable to independently make changes in body position without the use of a hospital bed. Pillows and wedges will not be adequate for resolving these positional issues.      Patient has a mobility limitation that significantly impairs their ability to participate in one or more mobility related activities of daily living in customary locations in the home. The mobility limitation cannot be sufficiently resolved by the use of a cane or walker. The use of a manual wheelchair will greatly improve the patient's ability to participate in MRADLs. The patient will use the wheelchair on a regular basis at home. They have expressed their willingness to use a manual wheelchair in the home, and have a caregiver who is available and willing to assist with the wheelchair if needed.    Assessment:     Dahlia Spence is a 88 y.o. female admitted with a medical diagnosis of allergic reaction, cellulitis of knee.  She presents with the following impairments/functional limitations: weakness, impaired endurance, impaired self care skills, impaired functional mobility, gait instability, impaired balance, pain, decreased safety awareness, decreased lower extremity function, decreased upper extremity function, impaired cognition, decreased ROM, visual deficits, impaired fine motor, impaired skin,  "edema, orthopedic precautions, impaired coordination. The patient presents from [facility], she has a history of R TKA 4/22/24, followed by a fall with R femur fracture requiring ORIF; patient with complex medical course with wound dehiscence and infections, now presenting with allergic reaction to medication. Patient with full body skin peeling and itching, unable to maintain eyes open due to skin peeling and swelling.  Patient confused and unable to follow commands to actively participate in mobility. She required total assistance for supine<>sit, she sat edge of bed 8 min with minimum assistance to moderate assistance for postural support due to posterior lean. Patient with R knee immobilizer donned, presumed NWBing to R LE and R knee locked in extension- awaiting clarification from ortho. Patient's daughter reports she was ambulatory with RW prior to TKA in April. Patient's daughter prefers to take patient home vs return to SNF, the patient will be dependent with mobility on discharge.      Rehab Prognosis: Fair and Poor; patient would benefit from acute skilled PT services to address these deficits and reach maximum level of function.    Recent Surgery: * No surgery found *      Plan:     During this hospitalization, patient to be seen 3 x/week to address the identified rehab impairments via gait training, therapeutic exercises, therapeutic activities, neuromuscular re-education and progress toward the following goals:    Plan of Care Expires:  07/26/24    Subjective     Chief Complaint: "Is my dad sleeping over there?"; per daughter "She broke her leg with PT when they got her up the first day after her knee replacement, the PT couldn't do anything to stop her fall, I just had to sit there and watch it happen"   Patient/Family Comments/goals: per daughter- return home with patient   Pain/Comfort:  Pain Rating 1:  (appeared uncomfortable with mobility, unable to rate or localize)    Patients cultural, " spiritual, Mandaeism conflicts given the current situation: no    Living Environment:  The patient lives with daughter and grandson in mobile home, 4 [address] vs ramp entry, [facility].    Prior to admission, patients level of function was ambulatory with RW prior to TKA in April 2024, per therapy notes since TKA- patient has been maximum assistance x2 to total assist with bed mobility and working on sitting balance.  Equipment used at home: walker, rolling.  DME owned (not currently used): none.  Upon discharge, patient will have assistance from family.    Objective:     Communicated with RN prior to session.  Patient found HOB elevated with telemetry, peripheral IV, restraints, rueda catheter  upon PT entry to room.    General Precautions: Standard, fall  Orthopedic Precautions:RLE non weight bearing (awaiting WBing clarification from MD, presumed R knee locked in extension, NWBing R LE)   Braces: Knee immobilizer  Respiratory Status: Room air    Exams:    Cognitive Exam  Patient is A&O xself and follows occasional one -step commands    Fine Motor Coordination   -       Impaired      Postural Exam Patient presented with the following abnormalities:    -       Rounded shoulders  -       Forward head  -       Kyphosis  -       Posterior pelvic tilt  -       Weight shift posterior and L   Sensation    -       Light touch unable to assess 2/2 cognition, withdraws to pain   Skin Integrity/Edema     -       Skin integrity: full body rash, skin is peeling  -       Edema: R LE, PRISCILLA feet   R LE ROM R knee with immobilizer in place, ankle WNL   R LE Strength 1/5 hip flexion hip abd/adduction, NA knee ext/flex, and 2-/5 ankle DF/PF   L LE ROM PROM limited by stiffness   L LE Strength  2-/5 hip flexion, knee ext/flex, and ankle DF/PF; MMT limited by cognition       Balance   Static Sitting minimum assistance to moderate assistance posterior lean    Dynamic Sitting NA   Static Standing NA   Dynamic Standing       NA        Functional  "Mobility:    Bed Mobility  Rolling to R and L: total assistance   Supine to Sit on the R side:  total assistance x2   Sit to supine: total assistance x2  Scoot to HOB in supine: total assistance x2 drawsheet  Scoot to EOB in sitting: total assistance    Transfers Sit to Stand:  NA          AM-PAC 6 CLICK MOBILITY  Total Score:8       Treatment & Education:  Patient and family educated on:  -role of therapy  -goals of session  -PT POC  -benefits of out of bed mobility and consequences of immobility  -calling for staff assist to mobilize safely  Patient agreeable to mobilize with therapy.      Patient with mitts on due to pulling at lines, constantly scratching skin.     Sitting edge of bed 8 minutes, minimum assistance to moderate assistance , weight shift posterior; R LE elevated and propped in extension on trash can covered with valentin pad  -Posterior pelvic tilt, kyphosis, cervical flexion  -Visual/verbal/manual cues for midline orientation, thoracic and cervical extension  -Cued/hand over hand assist to maintain hands in Wbing and to stop itching skin    Performed AAROM to L lower extremity (max assist to perform ROM): hip/knee flex/extension, hip abd/adduction, PRISCILLA ankle DF/PF,; 10 ea. Calf stretch to PRISCILLA LE; 30" x2.      Patient's daughter educated on ROM therex for LE, importance of positioning and Q2H rolling for pressure relief. Educated daughter on equipment needs for discharge home. She verbalized understanding.     The patient is appropriate for Q2H turns with RN staff.      Patient left right sidelying, pressure relief boots donned, UE elevated and supported with all lines intact, call button in reach, bed alarm on, restraints reapplied at end of session, RN notified, and daughter present.    GOALS:   Multidisciplinary Problems       Physical Therapy Goals          Problem: Physical Therapy    Goal Priority Disciplines Outcome Goal Variances Interventions   Physical Therapy Goal     PT, PT/OT Progressing  "    Description: Goals to be met by:      Patient will increase functional independence with mobility by performin. Supine to sit with Maximum Assistance  2. Sit to supine with Maximum Assistance  3. Rolling to Left and Right with Maximum Assistance.  4. Bed to chair transfer with Maximum Assistance using LRAD  5. Lateral scooting in sitting with maximum assistance.   6. Sitting at edge of bed x8 minutes with Contact Guard Assistance  7. Sit to stand with maximum assistance x2 LRAD                         History:     Past Medical History:   Diagnosis Date    Arthritis     Gout     High cholesterol     Hypertension        Past Surgical History:   Procedure Laterality Date    ANKLE SURGERY      CHOLECYSTECTOMY      HYSTERECTOMY         Time Tracking:     PT Received On: 24  PT Start Time: 1040     PT Stop Time: 1107  PT Total Time (min): 27 min     Billable Minutes: Evaluation 15 and Therapeutic Exercise 10      2024

## 2024-06-26 NOTE — NURSING
Plan of care reviewed on am rounds when daughter here.fredrick Roy.Patient noted to be in afib this afternoon, rate controlled 60's-70's. IM team notified. Wbc 9, anemia stable with Iron studies sent. Cr slowly improving 3.2 with adequate urine output.Ca 7.3 replaced.LR remains in progress. Po intake poor, only taking applesauce and small amount water/juice. Remains incontinent of stool, rash still present.Triad to buttocks/ perineal excoriation, appears to be improving as redness improving.Bilat mittens still needed with active restraint order.Turned/repositioned every 2 hours, waffle mattress on. Daughter to visit early, present in room for some MD rounds.Fall/aspiration precautions maintained. SW/CM working on disposition plan.Daughter has stated her intention to care for her at home.

## 2024-06-26 NOTE — ASSESSMENT & PLAN NOTE
Lab Results   Component Value Date    K 4.1 06/26/2024   . Will continue potassium replacement per protocol and recheck repeat levels after replacement completed.

## 2024-06-26 NOTE — SUBJECTIVE & OBJECTIVE
Interval History:     Afebrile. No white count. Rash improving. Remains disoriented.      Review of Systems   Unable to perform ROS: Other (disoriented)     Objective:     Vital Signs (Most Recent):  Temp: 98.2 °F (36.8 °C) (06/26/24 1144)  Pulse: 61 (06/26/24 1130)  Resp: 16 (06/26/24 1130)  BP: (!) 145/61 (06/26/24 1130)  SpO2: 96 % (06/26/24 1130) Vital Signs (24h Range):  Temp:  [97.6 °F (36.4 °C)-98.2 °F (36.8 °C)] 98.2 °F (36.8 °C)  Pulse:  [58-84] 61  Resp:  [12-16] 16  SpO2:  [94 %-97 %] 96 %  BP: (120-145)/(56-82) 145/61     Weight: 84.4 kg (186 lb)  Body mass index is 34.02 kg/m².    Estimated Creatinine Clearance: 12.5 mL/min (A) (based on SCr of 3.2 mg/dL (H)).     Physical Exam  Vitals and nursing note reviewed.   Constitutional:       General: She is not in acute distress.     Appearance: She is well-developed. She is obese. She is ill-appearing.   HENT:      Head: Normocephalic and atraumatic.      Right Ear: External ear normal.      Left Ear: External ear normal.      Nose: Nose normal.   Eyes:      General: No scleral icterus.        Right eye: No discharge.         Left eye: No discharge.      Extraocular Movements: Extraocular movements intact.      Conjunctiva/sclera: Conjunctivae normal.   Cardiovascular:      Rate and Rhythm: Normal rate and regular rhythm.      Heart sounds: Normal heart sounds. No murmur heard.  Pulmonary:      Effort: Pulmonary effort is normal. No respiratory distress.      Breath sounds: Normal breath sounds. No stridor. No wheezing.   Abdominal:      General: Abdomen is flat. There is no distension.      Palpations: Abdomen is soft.      Tenderness: There is no abdominal tenderness.   Musculoskeletal:         General: Tenderness and signs of injury present. Normal range of motion.      Cervical back: Normal range of motion.   Skin:     General: Skin is warm and dry.      Findings: Erythema and rash present.      Comments: Diffuse erythematous rash with skin  peeling/desquamation present. See photos below  Necrotic fibrinous knee graft   Neurological:      Mental Status: She is alert. She is disoriented.      Comments: Alert but disoriented.        6/24/24 updated photos:                                  Previous:                                                              Significant Labs: CBC:   Recent Labs   Lab 06/25/24  0422 06/26/24  0559   WBC 9.72 9.76   HGB 8.2* 7.6*   HCT 22.9* 21.3*    133*     CMP:   Recent Labs   Lab 06/25/24  0422 06/25/24 2111 06/26/24  0559    141 140   K 3.1* 3.2* 4.1    102 102   CO2 24 21* 23   * 139* 131*   BUN 81* 76* 78*   CREATININE 3.8* 3.2* 3.2*   CALCIUM 6.9* 7.6* 7.3*   PROT 4.7*  --   --    ALBUMIN 1.7* 1.7* 1.6*   BILITOT 0.2  --   --    ALKPHOS 76  --   --    AST 25  --   --    ALT 7*  --   --    ANIONGAP 15 18* 15     Microbiology Results (last 7 days)       Procedure Component Value Units Date/Time    Blood culture #1 **CANNOT BE ORDERED STAT** [[id number]] Collected: 06/21/24 1443    Order Status: Completed Specimen: Blood from Peripheral, Antecubital, Right Updated: 06/25/24 1812     Blood Culture, Routine No Growth to date      No Growth to date      No Growth to date      No Growth to date      No Growth to date    Blood culture #2 **CANNOT BE ORDERED STAT** [[id number]] Collected: 06/21/24 1443    Order Status: Completed Specimen: Blood from Peripheral, Antecubital, Right Updated: 06/25/24 1812     Blood Culture, Routine No Growth to date      No Growth to date      No Growth to date      No Growth to date      No Growth to date          Recent Lab Results  (Last 5 results in the past 24 hours)        06/26/24  1214   06/26/24  0935   06/26/24  0559   06/25/24 2111 06/25/24  2021        Albumin     1.6   1.7         Anion Gap     15   18         Baso #     0.01           Basophil %     0.1           BUN     78   76         Calcium     7.3   7.6         Calcium Level Ionized   1.00              Chloride     102   102         CO2     23   21         Creatinine     3.2   3.2         Differential Method     Automated           eGFR     13.5   13.5         Eos #     0.0           Eos %     0.0           Glucose     131   139         Gran # (ANC)     7.6           Gran %     77.8           Hematocrit     21.3           Hemoglobin     7.6           Immature Grans (Abs)     0.42  Comment: Mild elevation in immature granulocytes is non specific and   can be seen in a variety of conditions including stress response,   acute inflammation, trauma and pregnancy. Correlation with other   laboratory and clinical findings is essential.             Immature Granulocytes     4.3           Lymph #     1.5           Lymph %     15.6           Magnesium      1.7           MCH     32.9           MCHC     35.7           MCV     92           Mono #     0.2           Mono %     2.2           MPV     11.0           nRBC     0           Phosphorus Level     6.0   6.0              6.0           Platelet Count     133           POCT Glucose 159         172       Potassium     4.1   3.2         RBC     2.31           RDW     21.8           Sodium     140   141         WBC     9.76                                  Significant Imaging:     Imaging Results    None

## 2024-06-26 NOTE — ASSESSMENT & PLAN NOTE
Urine microscopy showed course granular cast consistent with ATN. The sample was heavily populated with squamous cells, making identification of WBCs or RBCs difficult however urine dipstick done in our office today did not reveal any WBCs or RBCs which contrast the urine dipstick preformed on arrival. Due to overpopulation of squamous cells in the urine sample, AIN cannot be fully excluded at this time.     Recommendations;  -Renal functions are improving, no need of RRT at this time.  -Ionized calcium  -Avoid nephrotoxic agents (IV contrast, NSAID's, gadolinium contrast, PPI's) if able.   -Maintain MAP above 65 mmHg.   -Strict I's and O's  -Daily renal function panel  -Renally dose all medications

## 2024-06-26 NOTE — PROGRESS NOTES
[facility] - Trans[facility] Stepdown  Infectious Disease  Progress Note    Patient Name: Dahlia Spence  MRN: [id number]  Admission Date: 6/21/2024  Length of Stay: 4 days  Attending Physician: Dwight Granados MD  Primary Care Provider: Frandy Roe MD    Isolation Status: No active isolations  Assessment/Plan:      Oncology  DRESS syndrome    88-year-old female with history of right TKA  c/b distal femur periprosthetic fracture secondary to a fall s/p right knee revision of TKA with distal femoral replacement on 4/29 (completed empiric Linezolid > Daptomycin x 10 days through 5/11). Course complicated by right knee wound dehiscence s/p surgical I&D 5/16 followed by skin graft placement on 5/23. Cultures 5/16 grew Proteus and Pseudomonas. There were concerns for possible hardware involvement (Op note with dehiscence of inferior aspect of the capsular repair). Patient was discharged on six weeks of IV Zosyn from I&D (EOC 6/27). She followed up with ID on 6/6 and knee graft noted to be necrotic with slough present.  Wound cultures  grew CONS and alpha hemalytic strep. It is unclear if patient was started on a new antibiotic since this time, awaiting records from case mgmt to clarify.     Patient presented with diffuse rash and tongue swelling. In the ED: afebrile and VSS. WBC 20K, eosinophilia noted,K 3, Cr 5, , ESR 61. She was given IVF, cefepime, antihistamine, steroids in ED.  ID consulted for recommendations.    Now, pt afebrile. HDS. Without leukocytosis. Remains anemic. Platelets 181. Sed rate 61. Dermatology following, recommended to stop unnecessary medications, including antibiotics. Pt was started on steroids. Punch biopsy obtained, pathology noting drug eruption with eosinophilia and systemic symptoms (DRESS), though negative for SJS. Recommended peripheral smear, PCR for HHV6, HHV7, EBV, CMV.     Ortho consulted. 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 Recommending  follow up with ortho team at . Awaiting plans regarding transfer to [facility] vs outpatient follow up.       Recommendations  Okay to continue to hold antibiotics. Awaiting records from case mgmt to further assess if she has been on any new medications/antibiotics other than Zosyn  Pt with limited antibiotic options. unable to rule out chronic PJI at this time. Will need further imaging. Primary team discussing with ortho surgery. Will follow for recs.   Continue wound care. Optimize nutrition.   Discussed plan with ID staff. ID will follow closely.             Thank you for your consult. I will follow-up with patient. Please contact us if you have any additional questions.    Silverio Newell NP  Infectious Disease  [facility] - Trans[facility]    Subjective:     Principal Problem:<principal problem not specified>    HPI: Dahlia Spence is an 88-year-old female with past medical history of hypertension, hyperlipidemia, osteoarthritis, DM, PVD who presented to [facility] ED 6/21 for a diffuse skin rash.     Patient has a history of right TKA 4/22/2024. She was working with physical therapy postoperatively and sustained a distal femur periprosthetic fracture of the right knee secondary to a fall. Due to proximity with knee arthroplasty, she underwent a right knee revision of TKA with distal femoral replacement on 4/29/2024. Due to increased risk of post op infection, ID was consulted and recommended 10 days of empiric antibiotics. She was initially on Linezolid (d/c due to thrombocytopenia) and completed course with Daptomycin (EOC 5/11). Patient was discharged to a nursing facility though represented 05/15/2024 with wound dehiscence. On 5/16 she underwent surgical I&D followed by muscle flap and skin graft placement on 5/23. Cultures 5/16 grew pan susceptible Proteus and Pseudomonas. There were concerns for possible hardware involvement (Op note with dehiscence of inferior aspect of the capsular repair). ID recommended six  weeks of IV Zosyn from I&D (EOC 6/27).    Patient followed up with ID on 6/6. Her wound had some evidence of slough and necrosis and she underwent light debridement. Wound cultures  grew CONS and alpha hemalytic strep. Patient's daugher is at bedside and reports she was suppose to start a new oral antibiotic but doesn't believe the patient started in. Over the last week the patient has developed a full body rash and AMS. She was brought to the ED after complaining of tongue swelling.     In the ED: afebrile and VSS. WBC 20K, eosinophilia noted,K 3, Cr 5, , ESR 61. She was given IVF, Cefepime, antihistamine, steroids.  ID consulted for recommendations.  Interval History:     Afebrile. No white count. Rash improving. Remains disoriented.      Review of Systems   Unable to perform ROS: Other (disoriented)     Objective:     Vital Signs (Most Recent):  Temp: 98.2 °F (36.8 °C) (06/26/24 1144)  Pulse: 61 (06/26/24 1130)  Resp: 16 (06/26/24 1130)  BP: (!) 145/61 (06/26/24 1130)  SpO2: 96 % (06/26/24 1130) Vital Signs (24h Range):  Temp:  [97.6 °F (36.4 °C)-98.2 °F (36.8 °C)] 98.2 °F (36.8 °C)  Pulse:  [58-84] 61  Resp:  [12-16] 16  SpO2:  [94 %-97 %] 96 %  BP: (120-145)/(56-82) 145/61     Weight: 84.4 kg (186 lb)  Body mass index is 34.02 kg/m².    Estimated Creatinine Clearance: 12.5 mL/min (A) (based on SCr of 3.2 mg/dL (H)).     Physical Exam  Vitals and nursing note reviewed.   Constitutional:       General: She is not in acute distress.     Appearance: She is well-developed. She is obese. She is ill-appearing.   HENT:      Head: Normocephalic and atraumatic.      Right Ear: External ear normal.      Left Ear: External ear normal.      Nose: Nose normal.   Eyes:      General: No scleral icterus.        Right eye: No discharge.         Left eye: No discharge.      Extraocular Movements: Extraocular movements intact.      Conjunctiva/sclera: Conjunctivae normal.   Cardiovascular:      Rate and Rhythm: Normal rate  and regular rhythm.      Heart sounds: Normal heart sounds. No murmur heard.  Pulmonary:      Effort: Pulmonary effort is normal. No respiratory distress.      Breath sounds: Normal breath sounds. No stridor. No wheezing.   Abdominal:      General: Abdomen is flat. There is no distension.      Palpations: Abdomen is soft.      Tenderness: There is no abdominal tenderness.   Musculoskeletal:         General: Tenderness and signs of injury present. Normal range of motion.      Cervical back: Normal range of motion.   Skin:     General: Skin is warm and dry.      Findings: Erythema and rash present.      Comments: Diffuse erythematous rash with skin peeling/desquamation present. See photos below  Necrotic fibrinous knee graft   Neurological:      Mental Status: She is alert. She is disoriented.      Comments: Alert but disoriented.        6/24/24 updated photos:                                  Previous:                                                              Significant Labs: CBC:   Recent Labs   Lab 06/25/24  0422 06/26/24  0559   WBC 9.72 9.76   HGB 8.2* 7.6*   HCT 22.9* 21.3*    133*     CMP:   Recent Labs   Lab 06/25/24  0422 06/25/24  2111 06/26/24  0559    141 140   K 3.1* 3.2* 4.1    102 102   CO2 24 21* 23   * 139* 131*   BUN 81* 76* 78*   CREATININE 3.8* 3.2* 3.2*   CALCIUM 6.9* 7.6* 7.3*   PROT 4.7*  --   --    ALBUMIN 1.7* 1.7* 1.6*   BILITOT 0.2  --   --    ALKPHOS 76  --   --    AST 25  --   --    ALT 7*  --   --    ANIONGAP 15 18* 15     Microbiology Results (last 7 days)       Procedure Component Value Units Date/Time    Blood culture #1 **CANNOT BE ORDERED STAT** [[id number]] Collected: 06/21/24 1443    Order Status: Completed Specimen: Blood from Peripheral, Antecubital, Right Updated: 06/25/24 1812     Blood Culture, Routine No Growth to date      No Growth to date      No Growth to date      No Growth to date      No Growth to date    Blood culture #2 **CANNOT BE  ORDERED STAT** [[id number]] Collected: 06/21/24 1443    Order Status: Completed Specimen: Blood from Peripheral, Antecubital, Right Updated: 06/25/24 1812     Blood Culture, Routine No Growth to date      No Growth to date      No Growth to date      No Growth to date      No Growth to date          Recent Lab Results  (Last 5 results in the past 24 hours)        06/26/24  1214   06/26/24  0935   06/26/24  0559   06/25/24  2111   06/25/24  2021        Albumin     1.6   1.7         Anion Gap     15   18         Baso #     0.01           Basophil %     0.1           BUN     78   76         Calcium     7.3   7.6         Calcium Level Ionized   1.00             Chloride     102   102         CO2     23   21         Creatinine     3.2   3.2         Differential Method     Automated           eGFR     13.5   13.5         Eos #     0.0           Eos %     0.0           Glucose     131   139         Gran # (ANC)     7.6           Gran %     77.8           Hematocrit     21.3           Hemoglobin     7.6           Immature Grans (Abs)     0.42  Comment: Mild elevation in immature granulocytes is non specific and   can be seen in a variety of conditions including stress response,   acute inflammation, trauma and pregnancy. Correlation with other   laboratory and clinical findings is essential.             Immature Granulocytes     4.3           Lymph #     1.5           Lymph %     15.6           Magnesium      1.7           MCH     32.9           MCHC     35.7           MCV     92           Mono #     0.2           Mono %     2.2           MPV     11.0           nRBC     0           Phosphorus Level     6.0   6.0              6.0           Platelet Count     133           POCT Glucose 159         172       Potassium     4.1   3.2         RBC     2.31           RDW     21.8           Sodium     140   141         WBC     9.76                                  Significant Imaging:     Imaging Results    None

## 2024-06-26 NOTE — SUBJECTIVE & OBJECTIVE
Interval History: thick eye crustations, AMS still the same, no acute events overnight    Review of Systems  Objective:     Vital Signs (Most Recent):  Temp: 98.2 °F (36.8 °C) (06/26/24 1144)  Pulse: 61 (06/26/24 1130)  Resp: 16 (06/26/24 1130)  BP: (!) 145/61 (06/26/24 1130)  SpO2: 96 % (06/26/24 1130) Vital Signs (24h Range):  Temp:  [97.6 °F (36.4 °C)-98.2 °F (36.8 °C)] 98.2 °F (36.8 °C)  Pulse:  [58-84] 61  Resp:  [12-16] 16  SpO2:  [94 %-97 %] 96 %  BP: (120-145)/(56-82) 145/61     Weight: 84.4 kg (186 lb)  Body mass index is 34.02 kg/m².    Intake/Output Summary (Last 24 hours) at 6/26/2024 1159  Last data filed at 6/25/2024 1804  Gross per 24 hour   Intake 260 ml   Output 350 ml   Net -90 ml         Physical Exam    Constitutional:       General: She is not in acute distress.     Appearance: She is not ill-appearing, toxic-appearing or diaphoretic.   HENT:      Head: Normocephalic and atraumatic.   Cardiovascular:      Rate and Rhythm: Normal rate and regular rhythm.      Pulses: Normal pulses.      Heart sounds: Normal heart sounds.   Pulmonary:      Effort: Pulmonary effort is normal. No respiratory distress.      Breath sounds: No wheezing.   Abdominal:      General: Bowel sounds are normal.      Palpations: Abdomen is soft.   Musculoskeletal:         General: Swelling (right knee) and tenderness present.   Skin:     Capillary Refill: Capillary refill takes less than 2 seconds.      Findings: Erythema and rash present.   Neurological:      Mental Status: She is disoriented.     Significant Labs: All pertinent labs within the past 24 hours have been reviewed.    Significant Imaging: I have reviewed all pertinent imaging results/findings within the past 24 hours.

## 2024-06-26 NOTE — PLAN OF CARE
Problem: Physical Therapy  Goal: Physical Therapy Goal  Description: Goals to be met by:      Patient will increase functional independence with mobility by performin. Supine to sit with Maximum Assistance  2. Sit to supine with Maximum Assistance  3. Rolling to Left and Right with Maximum Assistance.  4. Bed to chair transfer with Maximum Assistance using LRAD  5. Lateral scooting in sitting with maximum assistance.   6. Sitting at edge of bed x8 minutes with Contact Guard Assistance  7. Sit to stand with maximum assistance x2 LRAD    Outcome: Progressing   Evaluation completed, initiated plan of care.   Merle Rock, PT  2024

## 2024-06-26 NOTE — PROGRESS NOTES
[facility] Medicine  Progress Note    Patient Name: Dahlia Spence  MRN: [id number]  Patient Class: IP- Inpatient   Admission Date: 6/21/2024  Length of Stay: 4 days  Attending Physician: Dwight Granados MD  Primary Care Provider: Frandy Roe MD        Subjective:     Principal Problem:<principal problem not specified>        HPI:  88 YOF w/CKD3, T2DM, COPD, PVD 2/2 to DM, HTN, HLD, LBP, OA who presented for a skin rash. The patient had a recent I&D (05/16/2024) for her R knee wound and started zosyn. Then 7 days ago she developed full body hives with rash and tongue swelling. Family reports AMS and is not at her baseline.     Recent history: right distal femur replacement on 04/29/2024, then I&D of RLE 5/16/24, then dehiscence of the right TKA and underwent a right gastroc flap, split thickness skin graft, and wound VAC on 05/23/2024. Then the patient had a post op infection and had a 10 day course of linezolid to daptomycin. Then she had a second post op infection treated with a 6 week course of zosyn on  05/30/2024 and was seen by ID.     In the ED: patient received fluid resuscitation and IV ceftriaxone. She exhibited signs of minimal skin desqaumation amidst normal vital signs.  WBC 20.56  HGB 12.6     K 3.0  CRT 5.0 (baseline 1.5)    Overview/Hospital Course:  88 YOF w/CKD3, T2DM, COPD, PVD 2/2 to DM, HTN, HLD, LBP, OA who presented for a skin rash.Pt had a recent TKA, fall after OR and femur fracture, requiring ORIF followed by wound infection and complex healing course. Bone cx alpha hemolytic strep. Required ABX therapy guided by ID c/o Linezolid followed by recent initiation of Zosyn therapy. Developed marked drug eruption over the course of the last week. Presents with severe total body eruption, AMS and profound JOSÉ. Wound care, ID, ortho, derm, and nephro following. Concern for DRESS clinically, started on IV and topical steroids. DRESS confirmed on  pathology. Per ortho the patient is too sick for acute interventions, continue wound care only. ID recommends to continue holding antibiotics in the setting of DRESS. Developed an JOSÉ this admission which is likely 2/2 to prerenal, started on HCO3 gtt and transitioned to continuous LR. Started nightly seroquel for AMS 2/2 possibly to delirium from acute illness (neg CTH). Electrolytes being repleted and PT/OT onboard for assessment and management.      Interval History: thick eye crustations, AMS still the same, no acute events overnight    Review of Systems  Objective:     Vital Signs (Most Recent):  Temp: 98.2 °F (36.8 °C) (06/26/24 1144)  Pulse: 61 (06/26/24 1130)  Resp: 16 (06/26/24 1130)  BP: (!) 145/61 (06/26/24 1130)  SpO2: 96 % (06/26/24 1130) Vital Signs (24h Range):  Temp:  [97.6 °F (36.4 °C)-98.2 °F (36.8 °C)] 98.2 °F (36.8 °C)  Pulse:  [58-84] 61  Resp:  [12-16] 16  SpO2:  [94 %-97 %] 96 %  BP: (120-145)/(56-82) 145/61     Weight: 84.4 kg (186 lb)  Body mass index is 34.02 kg/m².    Intake/Output Summary (Last 24 hours) at 6/26/2024 1159  Last data filed at 6/25/2024 1804  Gross per 24 hour   Intake 260 ml   Output 350 ml   Net -90 ml         Physical Exam    Constitutional:       General: She is not in acute distress.     Appearance: She is not ill-appearing, toxic-appearing or diaphoretic.   HENT:      Head: Normocephalic and atraumatic.   Cardiovascular:      Rate and Rhythm: Normal rate and regular rhythm.      Pulses: Normal pulses.      Heart sounds: Normal heart sounds.   Pulmonary:      Effort: Pulmonary effort is normal. No respiratory distress.      Breath sounds: No wheezing.   Abdominal:      General: Bowel sounds are normal.      Palpations: Abdomen is soft.   Musculoskeletal:         General: Swelling (right knee) and tenderness present.   Skin:     Capillary Refill: Capillary refill takes less than 2 seconds.      Findings: Erythema and rash present.   Neurological:      Mental Status: She  is disoriented.     Significant Labs: All pertinent labs within the past 24 hours have been reviewed.    Significant Imaging: I have reviewed all pertinent imaging results/findings within the past 24 hours.    Assessment/Plan:      Eye abnormalities  Ophthalmo consultation      Weakness  PT/OT consult sent for assessment and post-acute care       Acidosis  HAGMA 2/2 to pre-renal JOSÉ    - was on HCO3 gtt, transitioned to LR   - CMP daily      Hypokalemia    Lab Results   Component Value Date    K 4.1 06/26/2024   . Will continue potassium replacement per protocol and recheck repeat levels after replacement completed.     Metabolic acidosis, increased anion gap  See acidosis      JOSÉ (acute kidney injury)  Patient with acute kidney injury/acute renal failure likely due to pre-renal azotemia due to dehydration JOSÉ is currently improving. Baseline creatinine  1.5  - Labs reviewed- Renal function/electrolytes with Estimated Creatinine Clearance: 10.5 mL/min (A) (based on SCr of 3.8 mg/dL (H)). according to latest data. Monitor urine output and serial BMP and adjust therapy as needed. Avoid nephrotoxins and renally dose meds for GFR listed above.    Open wound of right knee s/p R distal femoral replacement, I&D, and gastrocnemius flap  .Pt had a recent TKA, fall after OR and femur fracture, requiring ORIF followed by wound infection and complex healing course. Bone cx alpha hemolytic strep. Required ABX therapy guided by ID c/o Linezolid followed by recent initiation of Zosyn therapy. No off zosyn.     Plan:  Ortho consultation; recs appreciated  ID consult, pending recs        DRESS syndrome  Concern for DRESS clinically, started on IV and topical steroids. DRESS confirmed on pathologyID recommends to continue holding antibiotics in the setting of DRESS.     - antihistamine, topical steroid  - IV methylpred, taper to be determined based off the following labs (each disease state affects treatement course) - TSH, HHV6,  CMV, EMV, and HHV7 unable to be ordered   - derm consulted for drug reaction and topical corticosteroid, appreciate recs  - triamcinolone daily         Type 2 diabetes mellitus with other circulatory complications  Low dose ssi as needed prn  HBA1C      Chronic obstructive pulmonary disease, unspecified  Supplemental oxygen  Duo-nebs  Target SPO2 >88%    Chronic kidney disease, stage III (moderate)  Patient's CRT today is 5.0 >>5.4  Baseline in the system is 1.5  Acidosis with ph 7.1  HCO3 is 9  Phos 9    Plan:  - Nephrology consult, now  - urine studies pending     Benign essential hypertension  Chronic, controlled. Latest blood pressure and vitals reviewed-     Temp:  [97 °F (36.1 °C)-97.5 °F (36.4 °C)]   Pulse:  [56-87]   Resp:  [16-19]   BP: (111-168)/(58-87)   SpO2:  [95 %-99 %] .   Home meds for hypertension were reviewed and noted below.   Hypertension Medications               lisinopriL-hydrochlorothiazide (PRINZIDE,ZESTORETIC) 20-12.5 mg per tablet TAKE 1 TABLET BY MOUTH EVERY DAY            While in the hospital, will manage blood pressure as follows; Adjust home antihypertensive regimen as follows- HOLD    Will utilize p.r.n. blood pressure medication only if patient's blood pressure greater than 180/110 and she develops symptoms such as worsening chest pain or shortness of breath.      VTE Risk Mitigation (From admission, onward)           Ordered     heparin (porcine) injection 5,000 Units  Every 8 hours         06/21/24 1803     IP VTE HIGH RISK PATIENT  Once         06/21/24 1803     Place sequential compression device  Until discontinued         06/21/24 1803                    Discharge Planning   ACE: 6/28/2024     Code Status: DNR   Is the patient medically ready for discharge?:     Reason for patient still in hospital (select all that apply): Treatment  Discharge Plan A: Skilled Nursing Facility                  Oliva Harp MD  Department of Hospital Medicine   [facility] - [facility]  Stepdown

## 2024-06-26 NOTE — ASSESSMENT & PLAN NOTE
88-year-old female with history of right TKA  c/b distal femur periprosthetic fracture secondary to a fall s/p right knee revision of TKA with distal femoral replacement on 4/29 (completed empiric Linezolid > Daptomycin x 10 days through 5/11). Course complicated by right knee wound dehiscence s/p surgical I&D 5/16 followed by skin graft placement on 5/23. Cultures 5/16 grew Proteus and Pseudomonas. There were concerns for possible hardware involvement (Op note with dehiscence of inferior aspect of the capsular repair). Patient was discharged on six weeks of IV Zosyn from I&D (EOC 6/27). She followed up with ID on 6/6 and knee graft noted to be necrotic with slough present.  Wound cultures  grew CONS and alpha hemalytic strep. It is unclear if patient was started on a new antibiotic since this time, awaiting records from case mgmt to clarify.     Patient presented with diffuse rash and tongue swelling. In the ED: afebrile and VSS. WBC 20K, eosinophilia noted,K 3, Cr 5, , ESR 61. She was given IVF, cefepime, antihistamine, steroids in ED.  ID consulted for recommendations.    Now, pt afebrile. HDS. Without leukocytosis. Remains anemic. Platelets 181. Sed rate 61. Dermatology following, recommended to stop unnecessary medications, including antibiotics. Pt was started on steroids. Punch biopsy obtained, pathology noting drug eruption with eosinophilia and systemic symptoms (DRESS), though negative for SJS. Recommended peripheral smear, PCR for HHV6, HHV7, EBV, CMV.     Ortho consulted. 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 Recommending follow up with ortho team at . Awaiting plans regarding transfer to [facility] vs outpatient follow up.       Recommendations  Okay to continue to hold antibiotics. Awaiting records from case mgmt to further assess if she has been on any new medications/antibiotics other than Zosyn  Pt with limited antibiotic options. unable to rule out chronic  PJI at this time. Will need further imaging. Primary team discussing with ortho surgery. Will follow for recs.   Continue wound care. Optimize nutrition.   Discussed plan with ID staff. ID will follow closely.

## 2024-06-26 NOTE — PLAN OF CARE
Pt. No oriented, VSS, spo2> 94% on room air.   Pt. Up to BSC with 1 person assist.   No complaints of pain/N/V this shift.   K+-3.2--replacement given  Accucheck AC/HS--no SSI administered   Yuen in place, 1 BM this shift  Bed in low locked position, call light within reach, pt instructed to call for assistance needed, pt verbalized understanding, remains free from falls this shift. WCTM.     Nurses Note -- 4 Eyes      6/26/2024   4:09 AM      Skin assessed during: Q Shift Change      [] No Altered Skin Integrity Present    []Prevention Measures Documented      [x] Yes- Altered Skin Integrity Present or Discovered   [] LDA Added if Not in Epic (Describe Wound)   [] New Altered Skin Integrity was Present on Admit and Documented in LDA   [] Wound Image Taken    Wound Care Consulted? Yes    Attending Nurse:  Irma Gomes RN/Staff Member:  Miguel Angel

## 2024-06-26 NOTE — PLAN OF CARE
The patient today feels her skin continues to improve significantly. Reports mild itching but improved. No pain. She notes having crusting around eyes today.  Her Cr (3.2 down from 5.0) and WBC (9.76 down from 20.56) continues to improve.  AST/ALT remain unremarkable.    Exam:   -improving erythematous plaques on her leg   -superficial exfoliation present on face, mild erythema and exfoliation present on arms/abdomen  -honey crusting erosions around mouth     Rash, favor drug eruption with concern for severe cutaneous adverse reaction, notably DRESS   The rash continues to improve. The erythematous plaques on legs have faded significantly. Her skin continues to exfoliate. She does have new honey crusting around mouth.  Will treat empirically to cover HSV in setting of high steroids.     Recommendations:  -Recommend IV Solumedrol 80mg daily   -Start Mupirocin BID to mouth  -Apply Aquaphor BID to mouth   -Start PO Valtrex 1g BID for 5 days for empiric HSV coverage  -Optho for recs regarding eyedrops   -Order cytomegalovirus antibody, IgG and cytomegalovirus antibody, IgM, Herpesvirus-6 Human IgG and IgM antibodies     -Continue Triamcinolone BID PRN to rash; consider wet wraps for torso, non-infected extremities   -continue supportive care       1.  Skin, right chest, punch biopsy:   - EPIDERMAL SPONGIOSIS WITH KERATINOCYTE NECROSIS AND SUPERFICIAL MIXED INFLAMMATORY INFILTRATE (see comment)    Comment:  These histologic findings are compatible with a drug eruption, and would be compatible with drug related eruption with eosinophilia and systemic symptoms (DRESS) in the appropriate clinical context.  Histologic features of Olmos-Nuno  syndrome are not present.

## 2024-06-26 NOTE — SUBJECTIVE & OBJECTIVE
Interval History: Renal functions improving, decent UOP, still disoriented and not following commands.    Review of patient's allergies indicates:   Allergen Reactions    Zosyn [piperacillin-tazobactam] Rash     DRESS syndrome    Gabapentin Hallucinations     Current Facility-Administered Medications   Medication Frequency    acetaminophen tablet 650 mg Q6H PRN    atorvastatin tablet 10 mg Daily    calcium-vitamin D3 500 mg-5 mcg (200 unit) per tablet 1 tablet Daily    cetirizine tablet 5 mg Daily    dextrose 10% bolus 125 mL 125 mL PRN    dextrose 10% bolus 250 mL 250 mL PRN    glucagon (human recombinant) injection 1 mg PRN    glucose chewable tablet 16 g PRN    glucose chewable tablet 24 g PRN    heparin (porcine) injection 5,000 Units Q8H    hydrOXYzine HCL tablet 25 mg TID PRN    insulin aspart U-100 pen 0-5 Units QID (AC + HS) PRN    lactated ringers infusion Continuous    methylPREDNISolone sodium succinate injection 80 mg Q8H    naloxone 0.4 mg/mL injection 0.02 mg PRN    QUEtiapine tablet 25 mg QHS    sevelamer carbonate tablet 800 mg TID WM    sodium chloride 0.9% flush 10 mL Q12H PRN    sodium chloride 0.9% flush 10 mL Q6H    And    sodium chloride 0.9% flush 10 mL PRN    triamcinolone acetonide 0.1% cream BID       Objective:     Vital Signs (Most Recent):  Temp: 98.2 °F (36.8 °C) (06/26/24 1144)  Pulse: 61 (06/26/24 1130)  Resp: 16 (06/26/24 1130)  BP: (!) 145/61 (06/26/24 1130)  SpO2: (!) 94 % (06/26/24 0833) Vital Signs (24h Range):  Temp:  [97.6 °F (36.4 °C)-98.2 °F (36.8 °C)] 98.2 °F (36.8 °C)  Pulse:  [58-84] 61  Resp:  [12-16] 16  SpO2:  [94 %-97 %] 94 %  BP: (120-145)/(56-82) 145/61     Weight: 84.4 kg (186 lb) (06/21/24 1950)  Body mass index is 34.02 kg/m².  Body surface area is 1.92 meters squared.    I/O last 3 completed shifts:  In: 1912.1 [P.O.:60; I.V.:1452.1; IV Piggyback:400]  Out: 750 [Urine:750]     Physical Exam  Constitutional:       Appearance: Normal appearance.   Pulmonary:       Effort: Pulmonary effort is normal. No respiratory distress.      Breath sounds: No wheezing, rhonchi or rales.   Musculoskeletal:      Right lower leg: No edema.      Left lower leg: No edema.   Neurological:      Mental Status: She is alert. She is disoriented.          Significant Labs:  BMP:   Recent Labs   Lab 06/26/24  0559   *      K 4.1      CO2 23   BUN 78*   CREATININE 3.2*   CALCIUM 7.3*   MG 1.7     CBC:   Recent Labs   Lab 06/26/24  0559   WBC 9.76   RBC 2.31*   HGB 7.6*   HCT 21.3*   *   MCV 92   MCH 32.9*   MCHC 35.7        Significant Imaging:  Labs: Reviewed  ECG: Reviewed  X-Ray: Reviewed  US: Reviewed

## 2024-06-26 NOTE — PROGRESS NOTES
Pharmacist Renal Dose Adjustment Note    Dahlia Spence is a 88 y.o. female being treated with the medication valacyclovir 1000 mg twice daily    Patient Data:    Vital Signs (Most Recent):  Temp: 98.2 °F (36.8 °C) (06/26/24 1144)  Pulse: 65 (06/26/24 1451)  Resp: 16 (06/26/24 1130)  BP: (!) 145/61 (06/26/24 1130)  SpO2: 96 % (06/26/24 1130) Vital Signs (72h Range):  Temp:  [97 °F (36.1 °C)-98.2 °F (36.8 °C)]   Pulse:  [56-88]   Resp:  [12-20]   BP: (107-188)/(46-87)   SpO2:  [77 %-100 %]      Recent Labs   Lab 06/25/24  0422 06/25/24  2111 06/26/24  0559   CREATININE 3.8* 3.2* 3.2*     Serum creatinine: 3.2 mg/dL (H) 06/26/24 0559  Estimated creatinine clearance: 12.5 mL/min (A)    Medication: valacyclovir 1000 mg twice daily will be changed to valacyclovir 1000 mg once daily.    Pharmacist's Name: Amber Pena  Pharmacist's Extension: [phone]

## 2024-06-26 NOTE — PROGRESS NOTES
[facility]  Nephrology  Progress Note    Patient Name: Dahlia Spence  MRN: [id number]  Admission Date: 6/21/2024  Hospital Length of Stay: 4 days  Attending Provider: Dwight Granados MD   Primary Care Physician: Frandy Roe MD  Principal Problem:<principal problem not specified>    Subjective:     HPI: 88 YOF with past medical history of CKD3 (baseline creatinine between 1.3-1.5), T2DM, COPD, PVD 2/2 to DM, HTN, HLD, LBP, OA who presented for a skin rash and altered mentation. The patient had a recent I&D (05/16/2024) for her R knee wound and started zosyn. Then 7 days ago she developed full body hives with rash and tongue swelling.       In the ED: patient received fluid resuscitation and IV ceftriaxone. She exhibited signs of minimal skin desqaumation amidst normal vital signs.  Nephrology has been consulted for JOSÉ on CKD. Upon arrival her creatinine on arrival was noted to be 5.0, which is increased from her baseline. Other labs showed severe HAGMA with respiratory acidosis. There does not appear to be a triple acid/base disorder when correcting for her hypoalbuminemia. She was given a bolus of LR upon arrival, UA was collected and showed 1+ ketones, 16 RBCs/ hpf, urine sodium of 19.     Interval History: Renal functions improving, decent UOP, still disoriented and not following commands.    Review of patient's allergies indicates:   Allergen Reactions    Zosyn [piperacillin-tazobactam] Rash     DRESS syndrome    Gabapentin Hallucinations     Current Facility-Administered Medications   Medication Frequency    acetaminophen tablet 650 mg Q6H PRN    atorvastatin tablet 10 mg Daily    calcium-vitamin D3 500 mg-5 mcg (200 unit) per tablet 1 tablet Daily    cetirizine tablet 5 mg Daily    dextrose 10% bolus 125 mL 125 mL PRN    dextrose 10% bolus 250 mL 250 mL PRN    glucagon (human recombinant) injection 1 mg PRN    glucose chewable tablet 16 g PRN    glucose chewable tablet 24 g PRN     heparin (porcine) injection 5,000 Units Q8H    hydrOXYzine HCL tablet 25 mg TID PRN    insulin aspart U-100 pen 0-5 Units QID (AC + HS) PRN    lactated ringers infusion Continuous    methylPREDNISolone sodium succinate injection 80 mg Q8H    naloxone 0.4 mg/mL injection 0.02 mg PRN    QUEtiapine tablet 25 mg QHS    sevelamer carbonate tablet 800 mg TID WM    sodium chloride 0.9% flush 10 mL Q12H PRN    sodium chloride 0.9% flush 10 mL Q6H    And    sodium chloride 0.9% flush 10 mL PRN    triamcinolone acetonide 0.1% cream BID       Objective:     Vital Signs (Most Recent):  Temp: 98.2 °F (36.8 °C) (06/26/24 1144)  Pulse: 61 (06/26/24 1130)  Resp: 16 (06/26/24 1130)  BP: (!) 145/61 (06/26/24 1130)  SpO2: (!) 94 % (06/26/24 0833) Vital Signs (24h Range):  Temp:  [97.6 °F (36.4 °C)-98.2 °F (36.8 °C)] 98.2 °F (36.8 °C)  Pulse:  [58-84] 61  Resp:  [12-16] 16  SpO2:  [94 %-97 %] 94 %  BP: (120-145)/(56-82) 145/61     Weight: 84.4 kg (186 lb) (06/21/24 1950)  Body mass index is 34.02 kg/m².  Body surface area is 1.92 meters squared.    I/O last 3 completed shifts:  In: 1912.1 [P.O.:60; I.V.:1452.1; IV Piggyback:400]  Out: 750 [Urine:750]     Physical Exam  Constitutional:       Appearance: Normal appearance.   Pulmonary:      Effort: Pulmonary effort is normal. No respiratory distress.      Breath sounds: No wheezing, rhonchi or rales.   Musculoskeletal:      Right lower leg: No edema.      Left lower leg: No edema.   Neurological:      Mental Status: She is alert. She is disoriented.          Significant Labs:  BMP:   Recent Labs   Lab 06/26/24  0559   *      K 4.1      CO2 23   BUN 78*   CREATININE 3.2*   CALCIUM 7.3*   MG 1.7     CBC:   Recent Labs   Lab 06/26/24  0559   WBC 9.76   RBC 2.31*   HGB 7.6*   HCT 21.3*   *   MCV 92   MCH 32.9*   MCHC 35.7        Significant Imaging:  Labs: Reviewed  ECG: Reviewed  X-Ray: Reviewed  US: Reviewed  Assessment/Plan:     Renal/  JOSÉ (acute kidney  injury)  Urine microscopy showed course granular cast consistent with ATN. The sample was heavily populated with squamous cells, making identification of WBCs or RBCs difficult however urine dipstick done in our office today did not reveal any WBCs or RBCs which contrast the urine dipstick preformed on arrival. Due to overpopulation of squamous cells in the urine sample, AIN cannot be fully excluded at this time.     Recommendations;  -Renal functions are improving, no need of RRT at this time.  -Ionized calcium  -Discontinue fluids if no indications.  -Avoid nephrotoxic agents (IV contrast, NSAID's, gadolinium contrast, PPI's) if able.   -Maintain MAP above 65 mmHg.   -Strict I's and O's  -Daily renal function panel  -Renally dose all medications        Thank you for your consult. I will follow-up with patient. Please contact us if you have any additional questions.    Alem Jones MD  Nephrology  [facility] - [facility] Stepdown

## 2024-06-26 NOTE — CONSULTS
Consultation Report  Ophthalmology Service    Date: 06/26/2024    Chief complaint/Reason for Consult: Crusting of eyelids     History of Present Illness: Dahlia Spence is a 88 y.o. female with no noted or reported POcularHx and PMHx of HTN, HLD, T2DM, recent TKA and fall requiring ORIF c/b wound infections and complex post-op course presenting with skin rash that began 7 days prior to admission with hives, tongue swelling. Recent I&D of right knee wound and was started on Zosyn. 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 Dermatology was consulted and biopsy taken of rash, confirming DRESS syndrome with pathology. Ophthalmology is being consulted for eyelid crusting. Patient's hospital course has also been complicated by altered mental status making it difficult to assess baseline eye symptoms. Patient states her eyes do not hurt. States vision feels like it has been slowly getting worse over a period of months/years. Denies any acute vision changes.    POcularHx: Unable to assess per patient's interview or from chart review though exam reveals bilateral CEIOL    Current eye gtts: None     Family Hx:  family history is not on file.     PMHx:  has a past medical history of Arthritis, Gout, High cholesterol, and Hypertension.     PSurgHx:  has a past surgical history that includes Cholecystectomy; Ankle surgery; and Hysterectomy.     Home Medications:   Prior to Admission medications    Medication Sig Start Date End Date Taking? Authorizing Provider   acetaminophen (TYLENOL) 325 MG tablet Take 650 mg by mouth every 6 (six) hours as needed for Pain.   Yes Provider, Historical   albuterol-ipratropium (DUO-NEB) 2.5 mg-0.5 mg/3 mL nebulizer solution Take 3 mLs by nebulization every 6 (six) hours as needed for Wheezing. Rescue   Yes Provider, Historical   allopurinoL (ZYLOPRIM) 100 MG tablet TAKE 2 TABLETS BY MOUTH EVERY DAY  Patient taking differently: Take 100  mg by mouth 2 (two) times daily. 11/16/23  Yes Frandy Roe MD   amLODIPine (NORVASC) 5 MG tablet Take 5 mg by mouth once daily.   Yes Provider, Historical   ascorbic acid, vitamin C, (VITAMIN C) 500 MG tablet Take 500 mg by mouth 2 (two) times daily.   Yes Provider, Historical   aspirin (ECOTRIN) 81 MG EC tablet Take 81 mg by mouth once daily.   Yes Provider, Historical   benzonatate (TESSALON) 200 MG capsule Take 200 mg by mouth 3 (three) times daily as needed for Cough.   Yes Provider, Historical   famotidine (PEPCID) 20 MG tablet Take 20 mg by mouth 2 (two) times daily as needed for Heartburn.   Yes Provider, Historical   ferrous sulfate (FEOSOL) Tab tablet Take 1 tablet by mouth once daily.   Yes Provider, Historical   lisinopriL-hydrochlorothiazide (PRINZIDE,ZESTORETIC) 20-12.5 mg per tablet TAKE 1 TABLET BY MOUTH EVERY DAY 10/6/23  Yes Frandy Roe MD   multivitamin with folic acid (HIGH POTENCY MULTIVITAMIN) 400 mcg Tab Take 1 tablet by mouth once daily.   Yes Provider, Historical   oxyCODONE (OXY-IR) 5 mg Cap Take 5 mg by mouth every 6 (six) hours as needed for Pain.   Yes Provider, Historical   risedronate (ACTONEL) 35 MG tablet TAKE 1 TABLET BY MOUTH ONCE A WEEK  Patient taking differently: Take 35 mg by mouth every 7 days. On Friday. 2/9/24  Yes Frandy Roe MD   Saccharomyces boulardii (FLORASTOR) 250 mg capsule Take 250 mg by mouth once daily.   Yes Provider, Historical   simvastatin (ZOCOR) 20 MG tablet TAKE 1 TABLET BY MOUTH EVERY EVENING 8/27/23  Yes Frandy Roe MD        Medications this encounter:    atorvastatin  10 mg Oral Daily    calcium carbonate  500 mg Oral Daily    cetirizine  5 mg Oral Daily    [START ON 6/27/2024] ergocalciferol  50,000 Units Oral Twice Weekly    heparin (porcine)  5,000 Units Subcutaneous Q8H    [START ON 6/27/2024] methylPREDNISolone sodium succinate  80 mg Intravenous Daily    mupirocin   Topical (Top) BID    QUEtiapine  25 mg Oral QHS     sevelamer carbonate  800 mg Oral TID WM    sodium chloride 0.9%  10 mL Intravenous Q6H    triamcinolone acetonide 0.1%   Topical (Top) BID    valACYclovir  1,000 mg Oral Daily       Allergies: is allergic to zosyn [piperacillin-tazobactam] and gabapentin.     Social:  reports that she has never smoked. She has never used smokeless tobacco. She reports that she does not drink alcohol and does not use drugs.     ROS: As per HPI    Ocular examination/Dilated fundus examination:  Base Eye Exam       Visual Acuity (Snellen - Linear)         Right Left    Dist sc 20/100 20/100   Difficult to assess 2/2 mental status             Tonometry (Tonopen, 8:15 PM)         Right Left    Pressure 15 19              Pupils         Dark Light Shape React APD    Right 4 2 Round Brisk None    Left 4 2 Round Brisk None              Extraocular Movement         Right Left     Full, Ortho Full, Ortho   Unable to follow commands, but EOM's observed in all fields of action             Neuro/Psych    Unable to state name, year, location             Dilation       Both eyes: 2.5% Phenylephrine, 1% Mydriacyl @ 8:16 PM                  Slit Lamp and Fundus Exam       External Exam         Right Left    External Exfoliated skin overlying face, neck, arms, and hands Exfoliated skin overlying face, neck, arms, and hands              Pen Light Exam         Right Left    Lids/Lashes No lesions No lesions    Conjunctiva/Sclera White and quiet White and quiet    Cornea Clear Clear    Anterior Chamber Deep and quiet Deep and quiet    Iris Round and reactive Round and reactive    Lens Clear Clear    Anterior Vitreous Clear Clear              Fundus Exam         Right Left    Disc Pink, sharp Pink, sharp    C/D Ratio 0.5 0.5    Macula Flat, no holes Flat, no holes    Vessels Normal caliber Normal caliber    Periphery No holes, tears, detachments No holes, tears, detachments                            Assessment/Plan:     1. DRESS syndrome  - No lesions  noted to the eyelids besides exfoliated skin over eyelids, congruous with remainder of skin lesions over face   - Currently being treated for DRESS (biopsy confirmed) by dermatology with IV steroids.  - Exam difficult secondary to patient's AMS - unable to follow commands and is oriented x0 during my exam. Unable to establish accurate visual acuity, peripheral fields  - PERRL w/o APD, motility intact, IOP normal  - Anterior examination normal - lid margins, conjunctiva, and cornea all appear healthy without any fluorescein uptake or lesions noted (pictures in media and above)  - DFE within normal limits  - No evidence of ocular involvement  - Can start lubricating ophthalmic ointment (lubrifresh) BID to both eyes  - Ophthalmology will sign off, please re-consult with any further changes to vision/concerns    2. Hypertensive retinopathy, OU  - BP/BG control per primary/PCP  - Yearly DFE  - Will schedule f/u with Optometry    Ricardo Daniel MD PGY-2  [employer] Ophthalmology Resident  06/26/2024  8:17 PM    I have reviewed the history and exam of the patient and agree with the resident's exam, assessment and plan.

## 2024-06-26 NOTE — PT/OT/SLP PROGRESS
Occupational Therapy   Co-Treatment with PT  Co-treatment performed due to patient's multiple deficits requiring two skilled therapists to appropriately and safely assess patient's strength and endurance while facilitating functional tasks in addition to accommodating for patient's activity tolerance.     Name: Dahlia Spence  MRN: [id number]  Admitting Diagnosis:  <principal problem not specified>       Recommendations:     Discharge Recommendations: Moderate Intensity Therapy  Discharge Equipment Recommendations:  hospital bed  Barriers to discharge:       Assessment:     Dahlia Spence is a 88 y.o. female with a medical diagnosis of <principal problem not specified>.  She presents with the following performance deficits affecting function: weakness, impaired endurance, impaired self care skills, impaired functional mobility, impaired cognition, impaired balance, gait instability, decreased upper extremity function, decreased lower extremity function, decreased safety awareness, impaired coordination, orthopedic precautions, impaired cardiopulmonary response to activity.     Rehab Prognosis:  Good; patient would benefit from acute skilled OT services to address these deficits and reach maximum level of function.       Plan:     Patient to be seen 4 x/week to address the above listed problems via self-care/home management, therapeutic activities, therapeutic exercises  Plan of Care Expires: 07/25/24  Plan of Care Reviewed with: patient, daughter    Subjective     Patient/Family Comments/goals: to improve function  Pain/Comfort:  Pain Rating 1: 0/10  Pain Rating Post-Intervention 1: 0/10    Objective:     Communicated with: RN prior to session.  Patient found HOB elevated with telemetry, restraints, peripheral IV, rueda catheter upon OT entry to room.  A client care conference was completed by the OTR and the COLLINS prior to treatment by the COLLINS to discuss the patient's POC and current status.    General Precautions:  Standard, fall    Orthopedic Precautions:RLE toe touch weight bearing (RLE TTWB via secure chat from MD)  Braces: Knee immobilizer  Respiratory Status: Room air     Occupational Performance:     Bed Mobility:    Patient completed Scooting/Bridging with total assistance  Patient completed Supine to Sit with total assistance and 2 persons  Patient completed Sit to Supine with total assistance and 2 persons     Functional Mobility/Transfers:  Not performed d/t weakness     Activities of Daily Living:  Lower Body Dressing: total assistance to don socks       AMPAC 6 Click ADL: 8    Treatment & Education:  Pt educated on OT POC and frequency during hospital stay.   Pt sat EOB with Min A-Mod A for ~8 minutes.  Seated EOB, pt completed BUE therex AAROM([id number] each)  - Straight Arm Raises  - Bicep Curls  Pt educated on importance of OOB activity to improve function and activity tolerance.  Addressed all patient questions/concerns within COLLINS scope of practice.     Patient left HOB elevated with all lines intact, call button in reach, restraints reapplied at end of session, and daughter present    GOALS:   Multidisciplinary Problems       Occupational Therapy Goals          Problem: Occupational Therapy    Goal Priority Disciplines Outcome Interventions   Occupational Therapy Goal     OT, PT/OT Progressing    Description: Goals to be met by: 7/2/2024     Patient will increase functional independence with ADLs by performing:    UE Dressing with Moderate Assistance.  LE Dressing with Maximum Assistance.  Grooming while EOB with Maximum Assistance.  Toileting from bedside commode with Maximum Assistance for hygiene and clothing management.   Toilet transfer to bedside commode with Maximum Assistance.    DME Justifications     Hospital Bed ·       Patient requires a hospital bed for positioning of the body in ways that are not feasible with an ordinary bed. The patient requires special positioning for pain relief, limited  mobility, and/or being unable to independently make changes in body position without the use of a hospital bed. Pillows and wedges will not be adequate for resolving these positional issues.                              Time Tracking:     OT Date of Treatment: 06/26/24  OT Start Time: 1040  OT Stop Time: 1106  OT Total Time (min): 26 min    Billable Minutes:Therapeutic Activity 16  Neuromuscular Re-education 10    OT/MANDI: MANDI     Number of MANDI visits since last OT visit: 1    6/26/2024

## 2024-06-27 LAB
ALBUMIN SERPL BCP-MCNC: 1.7 G/DL (ref 3.5–5.2)
ALBUMIN SERPL BCP-MCNC: 1.8 G/DL (ref 3.5–5.2)
ALBUMIN SERPL BCP-MCNC: 1.9 G/DL (ref 3.5–5.2)
ANION GAP SERPL CALC-SCNC: 12 MMOL/L (ref 8–16)
ANION GAP SERPL CALC-SCNC: 14 MMOL/L (ref 8–16)
ANION GAP SERPL CALC-SCNC: 14 MMOL/L (ref 8–16)
BASOPHILS # BLD AUTO: 0.02 K/UL (ref 0–0.2)
BASOPHILS NFR BLD: 0.2 % (ref 0–1.9)
BUN SERPL-MCNC: 65 MG/DL (ref 8–23)
BUN SERPL-MCNC: 70 MG/DL (ref 8–23)
BUN SERPL-MCNC: 74 MG/DL (ref 8–23)
CA-I BLDV-SCNC: 1.06 MMOL/L (ref 1.06–1.42)
CALCIUM SERPL-MCNC: 7.8 MG/DL (ref 8.7–10.5)
CALCIUM SERPL-MCNC: 7.8 MG/DL (ref 8.7–10.5)
CALCIUM SERPL-MCNC: 7.9 MG/DL (ref 8.7–10.5)
CHLORIDE SERPL-SCNC: 105 MMOL/L (ref 95–110)
CHLORIDE SERPL-SCNC: 105 MMOL/L (ref 95–110)
CHLORIDE SERPL-SCNC: 106 MMOL/L (ref 95–110)
CO2 SERPL-SCNC: 23 MMOL/L (ref 23–29)
CREAT SERPL-MCNC: 2.5 MG/DL (ref 0.5–1.4)
CREAT SERPL-MCNC: 2.8 MG/DL (ref 0.5–1.4)
CREAT SERPL-MCNC: 3 MG/DL (ref 0.5–1.4)
DIFFERENTIAL METHOD BLD: ABNORMAL
EOSINOPHIL # BLD AUTO: 0 K/UL (ref 0–0.5)
EOSINOPHIL NFR BLD: 0.1 % (ref 0–8)
ERYTHROCYTE [DISTWIDTH] IN BLOOD BY AUTOMATED COUNT: 21.2 % (ref 11.5–14.5)
EST. GFR  (NO RACE VARIABLE): 14.6 ML/MIN/1.73 M^2
EST. GFR  (NO RACE VARIABLE): 15.8 ML/MIN/1.73 M^2
EST. GFR  (NO RACE VARIABLE): 18.2 ML/MIN/1.73 M^2
GLUCOSE SERPL-MCNC: 121 MG/DL (ref 70–110)
GLUCOSE SERPL-MCNC: 125 MG/DL (ref 70–110)
GLUCOSE SERPL-MCNC: 135 MG/DL (ref 70–110)
HCT VFR BLD AUTO: 24.2 % (ref 37–48.5)
HGB BLD-MCNC: 7.9 G/DL (ref 12–16)
IMM GRANULOCYTES # BLD AUTO: 0.5 K/UL (ref 0–0.04)
IMM GRANULOCYTES NFR BLD AUTO: 3.9 % (ref 0–0.5)
LYMPHOCYTES # BLD AUTO: 1.5 K/UL (ref 1–4.8)
LYMPHOCYTES NFR BLD: 11.7 % (ref 18–48)
MAGNESIUM SERPL-MCNC: 1.6 MG/DL (ref 1.6–2.6)
MCH RBC QN AUTO: 31.7 PG (ref 27–31)
MCHC RBC AUTO-ENTMCNC: 32.6 G/DL (ref 32–36)
MCV RBC AUTO: 97 FL (ref 82–98)
MONOCYTES # BLD AUTO: 0.6 K/UL (ref 0.3–1)
MONOCYTES NFR BLD: 4.6 % (ref 4–15)
MYELOPEROXIDASE AB SER-ACNC: 0.2 U/ML
NEUTROPHILS # BLD AUTO: 10.1 K/UL (ref 1.8–7.7)
NEUTROPHILS NFR BLD: 79.5 % (ref 38–73)
NRBC BLD-RTO: 0 /100 WBC
PHOSPHATE SERPL-MCNC: 4.9 MG/DL (ref 2.7–4.5)
PHOSPHATE SERPL-MCNC: 5 MG/DL (ref 2.7–4.5)
PHOSPHATE SERPL-MCNC: 5.1 MG/DL (ref 2.7–4.5)
PHOSPHATE SERPL-MCNC: 5.2 MG/DL (ref 2.7–4.5)
PLATELET # BLD AUTO: 159 K/UL (ref 150–450)
PMV BLD AUTO: 11.9 FL (ref 9.2–12.9)
POCT GLUCOSE: 125 MG/DL (ref 70–110)
POCT GLUCOSE: 142 MG/DL (ref 70–110)
POTASSIUM SERPL-SCNC: 3.4 MMOL/L (ref 3.5–5.1)
POTASSIUM SERPL-SCNC: 3.5 MMOL/L (ref 3.5–5.1)
POTASSIUM SERPL-SCNC: 3.6 MMOL/L (ref 3.5–5.1)
RBC # BLD AUTO: 2.49 M/UL (ref 4–5.4)
SODIUM SERPL-SCNC: 141 MMOL/L (ref 136–145)
SODIUM SERPL-SCNC: 142 MMOL/L (ref 136–145)
SODIUM SERPL-SCNC: 142 MMOL/L (ref 136–145)
WBC # BLD AUTO: 12.72 K/UL (ref 3.9–12.7)

## 2024-06-27 PROCEDURE — 86790 VIRUS ANTIBODY NOS: CPT | Mod: 91

## 2024-06-27 PROCEDURE — 80069 RENAL FUNCTION PANEL: CPT

## 2024-06-27 PROCEDURE — 86644 CMV ANTIBODY: CPT

## 2024-06-27 PROCEDURE — 85025 COMPLETE CBC W/AUTO DIFF WBC: CPT

## 2024-06-27 PROCEDURE — 63600175 PHARM REV CODE 636 W HCPCS

## 2024-06-27 PROCEDURE — 84100 ASSAY OF PHOSPHORUS: CPT

## 2024-06-27 PROCEDURE — 20600001 HC STEP DOWN PRIVATE ROOM

## 2024-06-27 PROCEDURE — 99232 SBSQ HOSP IP/OBS MODERATE 35: CPT | Mod: ,,, | Performed by: INTERNAL MEDICINE

## 2024-06-27 PROCEDURE — 86645 CMV ANTIBODY IGM: CPT

## 2024-06-27 PROCEDURE — 25000003 PHARM REV CODE 250

## 2024-06-27 PROCEDURE — 80069 RENAL FUNCTION PANEL: CPT | Mod: 91

## 2024-06-27 PROCEDURE — 99233 SBSQ HOSP IP/OBS HIGH 50: CPT | Mod: ,,, | Performed by: INTERNAL MEDICINE

## 2024-06-27 PROCEDURE — 82330 ASSAY OF CALCIUM: CPT | Performed by: INTERNAL MEDICINE

## 2024-06-27 PROCEDURE — 83735 ASSAY OF MAGNESIUM: CPT

## 2024-06-27 RX ADMIN — HEPARIN SODIUM 5000 UNITS: 5000 INJECTION INTRAVENOUS; SUBCUTANEOUS at 03:06

## 2024-06-27 RX ADMIN — VALACYCLOVIR HYDROCHLORIDE 1000 MG: 500 TABLET, FILM COATED ORAL at 08:06

## 2024-06-27 RX ADMIN — MINERAL OIL AND WHITE PETROLATUM: 30; 940 OINTMENT OPHTHALMIC at 08:06

## 2024-06-27 RX ADMIN — MUPIROCIN: 20 OINTMENT TOPICAL at 08:06

## 2024-06-27 RX ADMIN — QUETIAPINE FUMARATE 25 MG: 25 TABLET ORAL at 08:06

## 2024-06-27 RX ADMIN — CETIRIZINE HYDROCHLORIDE 5 MG: 5 TABLET, FILM COATED ORAL at 08:06

## 2024-06-27 RX ADMIN — TRIAMCINOLONE ACETONIDE: 1 CREAM TOPICAL at 08:06

## 2024-06-27 RX ADMIN — ATORVASTATIN CALCIUM 10 MG: 10 TABLET, FILM COATED ORAL at 08:06

## 2024-06-27 RX ADMIN — CALCIUM CARBONATE (ANTACID) CHEW TAB 500 MG 500 MG: 500 CHEW TAB at 08:06

## 2024-06-27 RX ADMIN — SODIUM CHLORIDE, POTASSIUM CHLORIDE, SODIUM LACTATE AND CALCIUM CHLORIDE: 600; 310; 30; 20 INJECTION, SOLUTION INTRAVENOUS at 01:06

## 2024-06-27 RX ADMIN — HEPARIN SODIUM 5000 UNITS: 5000 INJECTION INTRAVENOUS; SUBCUTANEOUS at 05:06

## 2024-06-27 RX ADMIN — HEPARIN SODIUM 5000 UNITS: 5000 INJECTION INTRAVENOUS; SUBCUTANEOUS at 08:06

## 2024-06-27 RX ADMIN — METHYLPREDNISOLONE SODIUM SUCCINATE 80 MG: 125 INJECTION, POWDER, FOR SOLUTION INTRAMUSCULAR; INTRAVENOUS at 08:06

## 2024-06-27 NOTE — NURSING
BUE mitten restraints d/c. Hands edematous, radial pulse +2 to palpation, cap refill <2 seconds. Pt's daughter @ bedside, attentive to pt. Pt's daughter encouraged to remain at bedside to prevent pt from scratching skin/pulling at medical lines.

## 2024-06-27 NOTE — PLAN OF CARE
Pt oriented to self. VSS, afebrile, on room air. A-fib (rate controlled) on tele monitoring. Pt w/o complaints of pain. LR infusing @ 125 cc/hr. Pt w/ minimal appetite, eating <25% of meals. Yuen intact w/ 300 cc/shift clear yellow UOP. Wound care provided per orders. Pt turned Q2 hr w/ pillow support. Bed in lowest position, wheels locked, call light within pt reach.

## 2024-06-27 NOTE — ASSESSMENT & PLAN NOTE
Urine microscopy showed course granular cast consistent with ATN. The sample was heavily populated with squamous cells, making identification of WBCs or RBCs difficult however urine dipstick done in our office today did not reveal any WBCs or RBCs which contrast the urine dipstick preformed on arrival. Due to overpopulation of squamous cells in the urine sample, AIN cannot be fully excluded at this time.     Recommendations;  -Renal functions are improving, no need of RRT at this time.  -Needs nutritional build up as very poor P/O intake.  -Avoid nephrotoxic agents (IV contrast, NSAID's, gadolinium contrast, PPI's) if able.   -Maintain MAP above 65 mmHg.   -Strict I's and O's  -Daily renal function panel  -Renally dose all medications

## 2024-06-27 NOTE — PROGRESS NOTES
[facility]  Progress Note    Patient Name: Dahlia Spence  MRN: [id number]  Patient Class: IP- Inpatient   Admission Date: 6/21/2024  Length of Stay: 5 days  Attending Physician: Akil Gandhi III*  Primary Care Provider: Frandy Roe MD        Subjective:     Principal Problem:<principal problem not specified>        HPI:  88 YOF w/CKD3, T2DM, COPD, PVD 2/2 to DM, HTN, HLD, LBP, OA who presented for a skin rash. The patient had a recent I&D (05/16/2024) for her R knee wound and started zosyn. Then 7 days ago she developed full body hives with rash and tongue swelling. Family reports AMS and is not at her baseline.     Recent history: right distal femur replacement on 04/29/2024, then I&D of RLE 5/16/24, then dehiscence of the right TKA and underwent a right gastroc flap, split thickness skin graft, and wound VAC on 05/23/2024. Then the patient had a post op infection and had a 10 day course of linezolid to daptomycin. Then she had a second post op infection treated with a 6 week course of zosyn on  05/30/2024 and was seen by ID.     In the ED: patient received fluid resuscitation and IV ceftriaxone. She exhibited signs of minimal skin desqaumation amidst normal vital signs.  WBC 20.56  HGB 12.6     K 3.0  CRT 5.0 (baseline 1.5)    Overview/Hospital Course:  88 YOF w/CKD3, T2DM, COPD, PVD 2/2 to DM, HTN, HLD, LBP, OA who presented for a skin rash.Pt had a recent TKA, fall after OR and femur fracture, requiring ORIF followed by wound infection and complex healing course. Bone cx alpha hemolytic strep. Required ABX therapy guided by ID c/o Linezolid followed by recent initiation of Zosyn therapy. Developed marked drug eruption over the course of the last week. Presents with severe total body eruption, AMS and profound JOSÉ. Wound care, ID, ortho, derm, and nephro following. Concern for DRESS clinically, started on IV and topical steroids. DRESS confirmed on  pathology. Per ortho the patient is too sick for acute interventions, continue wound care only. ID recommends to continue holding antibiotics in the setting of DRESS. Developed an JOSÉ this admission which is likely 2/2 to prerenal, started on HCO3 gtt and transitioned to continuous LR. Started nightly seroquel for AMS 2/2 possibly to delirium from acute illness (neg CTH). Electrolytes being repleted and PT/OT onboard for assessment and management. Patient's AMS is still not at baseline. Skin rash is slowly improving on IV solemedrol, requires prolonged stay in order to improve rash.      Interval History: rash is slightly improving but still there and itchy. Eye crustation improving with topical applications by ophthalmo but requires continued administration. Mentation still altered; not at baseline. No acute events overnight.    Review of Systems  Objective:     Vital Signs (Most Recent):  Temp: 98.1 °F (36.7 °C) (06/27/24 1517)  Pulse: 70 (06/27/24 1517)  Resp: 14 (06/27/24 1517)  BP: (!) 153/73 (06/27/24 1517)  SpO2: 96 % (06/27/24 1517) Vital Signs (24h Range):  Temp:  [97.5 °F (36.4 °C)-98.3 °F (36.8 °C)] 98.1 °F (36.7 °C)  Pulse:  [56-85] 70  Resp:  [14-18] 14  SpO2:  [93 %-98 %] 96 %  BP: (132-156)/(64-93) 153/73     Weight: 84.4 kg (186 lb)  Body mass index is 34.02 kg/m².    Intake/Output Summary (Last 24 hours) at 6/27/2024 1615  Last data filed at 6/27/2024 1200  Gross per 24 hour   Intake 1935 ml   Output 800 ml   Net 1135 ml         Physical Exam  HENT:      Head: Normocephalic and atraumatic.   Cardiovascular:      Rate and Rhythm: Normal rate and regular rhythm.      Pulses: Normal pulses.      Heart sounds: Normal heart sounds.   Pulmonary:      Effort: Pulmonary effort is normal. No respiratory distress.      Breath sounds: Normal breath sounds. No wheezing or rales.   Abdominal:      General: Bowel sounds are normal.      Palpations: Abdomen is soft.   Skin:     Capillary Refill: Capillary refill  takes less than 2 seconds.      Findings: Bruising, erythema and rash present.   Neurological:      Mental Status: She is alert. She is disoriented.             Significant Labs: All pertinent labs within the past 24 hours have been reviewed.    Significant Imaging: I have reviewed all pertinent imaging results/findings within the past 24 hours.    Assessment/Plan:      Eye abnormalities  Ophthalmo consultation, follow recs which are comprised of petroleum topical application        Weakness  PT/OT consult sent for assessment and post-acute care       Acidosis  HAGMA 2/2 to pre-renal JOSÉ    - was on HCO3 gtt, transitioned to LR   - CMP daily      Hypokalemia    Lab Results   Component Value Date    K 4.1 06/26/2024   . Will continue potassium replacement per protocol and recheck repeat levels after replacement completed.     Metabolic acidosis, increased anion gap  See acidosis      JOSÉ (acute kidney injury)  Patient with acute kidney injury/acute renal failure likely due to pre-renal azotemia due to dehydration JOSÉ is currently improving. Baseline creatinine  1.5  - Labs reviewed- Renal function/electrolytes with Estimated Creatinine Clearance: 14.3 mL/min (A) (based on SCr of 2.8 mg/dL (H)). according to latest data. Monitor urine output and serial BMP and adjust therapy as needed. Avoid nephrotoxins and renally dose meds for GFR listed above.    Open wound of right knee s/p R distal femoral replacement, I&D, and gastrocnemius flap  .Pt had a recent TKA, fall after OR and femur fracture, requiring ORIF followed by wound infection and complex healing course. Bone cx alpha hemolytic strep. Required ABX therapy guided by ID c/o Linezolid followed by recent initiation of Zosyn therapy. No off zosyn.     Plan:  Ortho consultation; recs appreciated  ID consult, pending recs        DRESS syndrome  Concern for DRESS clinically, started on IV and topical steroids. DRESS confirmed on pathologyID recommends to continue  holding antibiotics in the setting of DRESS.     - antihistamine, topical steroid  - IV methylpred, taper to be determined based off the following labs (each disease state affects treatement course) - TSH, HHV6, CMV, EMV, and HHV7 unable to be ordered   - derm consulted, agree with DRESS, to continue IV solemedrol, follow their recs.          Type 2 diabetes mellitus with other circulatory complications  Low dose ssi as needed prn  HBA1C      Chronic obstructive pulmonary disease, unspecified  Supplemental oxygen  Duo-nebs  Target SPO2 >88%    Chronic kidney disease, stage III (moderate)  Patient's CRT today is 5.0 >>5.4  Baseline in the system is 1.5  Acidosis with ph 7.1  HCO3 is 9  Phos 9    Plan:  - Nephrology consult, now  - urine studies pending     Benign essential hypertension  Chronic, controlled. Latest blood pressure and vitals reviewed-     Temp:  [97.5 °F (36.4 °C)-98.3 °F (36.8 °C)]   Pulse:  [56-85]   Resp:  [14-18]   BP: (132-156)/(64-93)   SpO2:  [93 %-98 %] .   Home meds for hypertension were reviewed and noted below.   Hypertension Medications               lisinopriL-hydrochlorothiazide (PRINZIDE,ZESTORETIC) 20-12.5 mg per tablet TAKE 1 TABLET BY MOUTH EVERY DAY            While in the hospital, will manage blood pressure as follows; Adjust home antihypertensive regimen as follows- HOLD    Will utilize p.r.n. blood pressure medication only if patient's blood pressure greater than 180/110 and she develops symptoms such as worsening chest pain or shortness of breath.      VTE Risk Mitigation (From admission, onward)           Ordered     heparin (porcine) injection 5,000 Units  Every 8 hours         06/21/24 1803     IP VTE HIGH RISK PATIENT  Once         06/21/24 1803     Place sequential compression device  Until discontinued         06/21/24 1803                    Discharge Planning   ACE: 7/1/2024     Code Status: DNR   Is the patient medically ready for discharge?:     Reason for patient  still in hospital (select all that apply): Treatment  Discharge Plan A: Skilled Nursing Facility                  Oliva Harp MD  Department of Hospital Medicine   [facility] - [facility]

## 2024-06-27 NOTE — ASSESSMENT & PLAN NOTE
88-year-old female with history of right TKA  c/b distal femur periprosthetic fracture secondary to a fall s/p right knee revision of TKA with distal femoral replacement on 4/29 (completed empiric Linezolid > Daptomycin x 10 days through 5/11). Course complicated by right knee wound dehiscence s/p surgical I&D 5/16 followed by skin graft placement on 5/23. Cultures 5/16 grew Proteus and Pseudomonas. There were concerns for possible hardware involvement (Op note with dehiscence of inferior aspect of the capsular repair). Patient was discharged on six weeks of IV Zosyn from I&D (EOC 6/27). She followed up with ID on 6/6 and knee graft noted to be necrotic with slough present.  Wound cultures  grew CONS and alpha hemalytic strep. It is unclear if patient was started on a new antibiotic since this time, awaiting records from case mgmt to clarify.     Patient presented with diffuse erythematous rash and tongue/facial swelling concerning for drug eruption and DRESS, which was confirmed on punch biopsy. She is improving with systemic steroids. In regards to her right knee, orthopedic surgery has no plans for acute intervention. Awaiting plans regarding transfer to [facility] vs outpatient follow up.       Recommendations  Continue to hold antibiotics. Awaiting records from case mgmt to further assess if she has been on any new medications/antibiotics other than Zosyn  Awaiting orthopedic surgery recommendations regarding right knee and if patient will be transferred to established surgeon.  Continue wound care. Optimize nutrition.   Continue plan per Dermatology  Discussed plan with ID staff. ID will follow closely.

## 2024-06-27 NOTE — SUBJECTIVE & OBJECTIVE
Interval History:     Afebrile. Rash improving/exfoliating. New crusting over lips and started on empiric valtrex and topical mupirocin.  Remains disoriented.      Review of Systems   Unable to perform ROS: Other (disoriented)   Skin:  Positive for color change and rash.   Psychiatric/Behavioral:  Positive for decreased concentration.      Objective:     Vital Signs (Most Recent):  Temp: 97.5 °F (36.4 °C) (06/27/24 1231)  Pulse: 60 (06/27/24 1231)  Resp: 16 (06/27/24 1231)  BP: 134/79 (06/27/24 1231)  SpO2: 98 % (06/27/24 1231) Vital Signs (24h Range):  Temp:  [97.5 °F (36.4 °C)-98.3 °F (36.8 °C)] 97.5 °F (36.4 °C)  Pulse:  [56-84] 60  Resp:  [16-18] 16  SpO2:  [93 %-98 %] 98 %  BP: (132-156)/(64-93) 134/79     Weight: 84.4 kg (186 lb)  Body mass index is 34.02 kg/m².    Estimated Creatinine Clearance: 14.3 mL/min (A) (based on SCr of 2.8 mg/dL (H)).     Physical Exam  Vitals and nursing note reviewed.   Constitutional:       General: She is not in acute distress.     Appearance: She is well-developed. She is obese. She is ill-appearing.   HENT:      Head: Normocephalic and atraumatic.      Right Ear: External ear normal.      Left Ear: External ear normal.      Nose: Nose normal.   Eyes:      General: No scleral icterus.        Right eye: No discharge.         Left eye: No discharge.      Extraocular Movements: Extraocular movements intact.      Conjunctiva/sclera: Conjunctivae normal.   Cardiovascular:      Rate and Rhythm: Normal rate and regular rhythm.      Heart sounds: Normal heart sounds. No murmur heard.  Pulmonary:      Effort: Pulmonary effort is normal. No respiratory distress.      Breath sounds: Normal breath sounds. No stridor. No wheezing.   Abdominal:      General: Abdomen is flat. There is no distension.      Palpations: Abdomen is soft.      Tenderness: There is no abdominal tenderness.   Musculoskeletal:         General: Tenderness and signs of injury present. Normal range of motion.       Cervical back: Normal range of motion.   Skin:     General: Skin is warm and dry.      Findings: Erythema and rash present.      Comments: Diffuse erythematous rash with skin peeling/desquamation present. See photos below  Necrotic fibrinous knee graft   Neurological:      Mental Status: She is alert. She is disoriented.      Comments: Alert but disoriented.        6/24/24 updated photos:                                  Previous:                                                              Significant Labs: CBC:   Recent Labs   Lab 06/26/24  0559 06/27/24  0556   WBC 9.76 12.72*   HGB 7.6* 7.9*   HCT 21.3* 24.2*   * 159     CMP:   Recent Labs   Lab 06/26/24  0559 06/27/24  0022 06/27/24  0556    142 141   K 4.1 3.5 3.4*    105 106   CO2 23 23 23   * 135* 125*   BUN 78* 74* 70*   CREATININE 3.2* 3.0* 2.8*   CALCIUM 7.3* 7.8* 7.9*   ALBUMIN 1.6* 1.8* 1.7*   ANIONGAP 15 14 12     Microbiology Results (last 7 days)       Procedure Component Value Units Date/Time    Blood culture #1 **CANNOT BE ORDERED STAT** [[id number]] Collected: 06/21/24 1443    Order Status: Completed Specimen: Blood from Peripheral, Antecubital, Right Updated: 06/26/24 1812     Blood Culture, Routine No growth after 5 days.    Blood culture #2 **CANNOT BE ORDERED STAT** [[id number]] Collected: 06/21/24 1443    Order Status: Completed Specimen: Blood from Peripheral, Antecubital, Right Updated: 06/26/24 1812     Blood Culture, Routine No growth after 5 days.          Recent Lab Results  (Last 5 results in the past 24 hours)        06/27/24  1219   06/27/24  0851   06/27/24  0556   06/27/24  0022   06/26/24  2119        Albumin     1.7   1.8         Anion Gap     12   14         Baso #     0.02           Basophil %     0.2           BUN     70   74         Calcium     7.9   7.8         Calcium Level Ionized   1.06             Chloride     106   105         CO2     23   23         Creatinine     2.8   3.0          Differential Method     Automated           eGFR     15.8   14.6         Eos #     0.0           Eos %     0.1           Glucose     125   135         Gran # (ANC)     10.1           Gran %     79.5           Hematocrit     24.2           Hemoglobin     7.9           Immature Grans (Abs)     0.50  Comment: Mild elevation in immature granulocytes is non specific and   can be seen in a variety of conditions including stress response,   acute inflammation, trauma and pregnancy. Correlation with other   laboratory and clinical findings is essential.             Immature Granulocytes     3.9           Lymph #     1.5           Lymph %     11.7           Magnesium      1.6           MCH     31.7           MCHC     32.6           MCV     97           Mono #     0.6           Mono %     4.6           MPV     11.9           nRBC     0           Phosphorus Level     5.0   5.2              5.1           Platelet Count     159           POCT Glucose 125         150       Potassium     3.4   3.5         RBC     2.49           RDW     21.2           Sodium     141   142         WBC     12.72                                  Significant Imaging:     Imaging Results    None

## 2024-06-27 NOTE — PROGRESS NOTES
[facility] - Transplant Stepdown  Infectious Disease  Progress Note    Patient Name: Dahlia Spence  MRN: [id number]  Admission Date: 6/21/2024  Length of Stay: 5 days  Attending Physician: Akil Gandhi III*  Primary Care Provider: Frandy Roe MD    Isolation Status: No active isolations  Assessment/Plan:      Oncology  DRESS syndrome    88-year-old female with history of right TKA  c/b distal femur periprosthetic fracture secondary to a fall s/p right knee revision of TKA with distal femoral replacement on 4/29 (completed empiric Linezolid > Daptomycin x 10 days through 5/11). Course complicated by right knee wound dehiscence s/p surgical I&D 5/16 followed by skin graft placement on 5/23. Cultures 5/16 grew Proteus and Pseudomonas. There were concerns for possible hardware involvement (Op note with dehiscence of inferior aspect of the capsular repair). Patient was discharged on six weeks of IV Zosyn from I&D (EOC 6/27). She followed up with ID on 6/6 and knee graft noted to be necrotic with slough present.  Wound cultures  grew CONS and alpha hemalytic strep. It is unclear if patient was started on a new antibiotic since this time, awaiting records from case mgmt to clarify.     Patient presented with diffuse erythematous rash and tongue/facial swelling concerning for drug eruption and DRESS, which was confirmed on punch biopsy. She is improving with systemic steroids. In regards to her right knee, orthopedic surgery has no plans for acute intervention. Awaiting plans regarding transfer to [facility] vs outpatient follow up.       Recommendations  Continue to hold antibiotics. Awaiting records from case mgmt to further assess if she has been on any new medications/antibiotics other than Zosyn  Awaiting orthopedic surgery recommendations regarding right knee and if patient will be transferred to established surgeon for further treatment.   Continue wound care. Optimize nutrition.   Continue plan per  Dermatology  Discussed plan with ID staff. ID will follow closely.         Thank you for the consult. Please secure chat for any questions.  Vanessa Pickering PA-C      Subjective:     Principal Problem:<principal problem not specified>    HPI: Dahlia Spence is an 88-year-old female with past medical history of hypertension, hyperlipidemia, osteoarthritis, DM, PVD who presented to [facility] ED 6/21 for a diffuse skin rash.     Patient has a history of right TKA 4/22/2024. She was working with physical therapy postoperatively and sustained a distal femur periprosthetic fracture of the right knee secondary to a fall. Due to proximity with knee arthroplasty, she underwent a right knee revision of TKA with distal femoral replacement on 4/29/2024. Due to increased risk of post op infection, ID was consulted and recommended 10 days of empiric antibiotics. She was initially on Linezolid (d/c due to thrombocytopenia) and completed course with Daptomycin (EOC 5/11). Patient was discharged to a nursing facility though represented 05/15/2024 with wound dehiscence. On 5/16 she underwent surgical I&D followed by muscle flap and skin graft placement on 5/23. Cultures 5/16 grew pan susceptible Proteus and Pseudomonas. There were concerns for possible hardware involvement (Op note with dehiscence of inferior aspect of the capsular repair). ID recommended six weeks of IV Zosyn from I&D (EOC 6/27).    Patient followed up with ID on 6/6. Her wound had some evidence of slough and necrosis and she underwent light debridement. Wound cultures  grew CONS and alpha hemalytic strep. Patient's daugher is at bedside and reports she was suppose to start a new oral antibiotic but doesn't believe the patient started in. Over the last week the patient has developed a full body rash and AMS. She was brought to the ED after complaining of tongue swelling.     In the ED: afebrile and VSS. WBC 20K, eosinophilia noted,K 3, Cr 5, , ESR 61. She was  given IVF, Cefepime, antihistamine, steroids.  ID consulted for recommendations.  Interval History:     Afebrile. Rash improving/exfoliating. New crusting over lips and started on empiric valtrex and topical mupirocin.  Remains disoriented.      Review of Systems   Unable to perform ROS: Other (disoriented)   Skin:  Positive for color change and rash.   Psychiatric/Behavioral:  Positive for decreased concentration.      Objective:     Vital Signs (Most Recent):  Temp: 97.5 °F (36.4 °C) (06/27/24 1231)  Pulse: 60 (06/27/24 1231)  Resp: 16 (06/27/24 1231)  BP: 134/79 (06/27/24 1231)  SpO2: 98 % (06/27/24 1231) Vital Signs (24h Range):  Temp:  [97.5 °F (36.4 °C)-98.3 °F (36.8 °C)] 97.5 °F (36.4 °C)  Pulse:  [56-84] 60  Resp:  [16-18] 16  SpO2:  [93 %-98 %] 98 %  BP: (132-156)/(64-93) 134/79     Weight: 84.4 kg (186 lb)  Body mass index is 34.02 kg/m².    Estimated Creatinine Clearance: 14.3 mL/min (A) (based on SCr of 2.8 mg/dL (H)).     Physical Exam  Vitals and nursing note reviewed.   Constitutional:       General: She is not in acute distress.     Appearance: She is well-developed. She is obese. She is ill-appearing.   HENT:      Head: Normocephalic and atraumatic.      Right Ear: External ear normal.      Left Ear: External ear normal.      Nose: Nose normal.   Eyes:      General: No scleral icterus.        Right eye: No discharge.         Left eye: No discharge.      Extraocular Movements: Extraocular movements intact.      Conjunctiva/sclera: Conjunctivae normal.   Cardiovascular:      Rate and Rhythm: Normal rate and regular rhythm.      Heart sounds: Normal heart sounds. No murmur heard.  Pulmonary:      Effort: Pulmonary effort is normal. No respiratory distress.      Breath sounds: Normal breath sounds. No stridor. No wheezing.   Abdominal:      General: Abdomen is flat. There is no distension.      Palpations: Abdomen is soft.      Tenderness: There is no abdominal tenderness.   Musculoskeletal:          General: Tenderness and signs of injury present. Normal range of motion.      Cervical back: Normal range of motion.   Skin:     General: Skin is warm and dry.      Findings: Erythema and rash present.      Comments: Diffuse erythematous rash with skin peeling/desquamation present. See photos below  Necrotic fibrinous knee graft   Neurological:      Mental Status: She is alert.      Comments: Alert and answering questions appropriately today      6/24/24 updated photos:                                  Previous:                                                              Significant Labs: CBC:   Recent Labs   Lab 06/26/24  0559 06/27/24  0556   WBC 9.76 12.72*   HGB 7.6* 7.9*   HCT 21.3* 24.2*   * 159     CMP:   Recent Labs   Lab 06/26/24  0559 06/27/24  0022 06/27/24  0556    142 141   K 4.1 3.5 3.4*    105 106   CO2 23 23 23   * 135* 125*   BUN 78* 74* 70*   CREATININE 3.2* 3.0* 2.8*   CALCIUM 7.3* 7.8* 7.9*   ALBUMIN 1.6* 1.8* 1.7*   ANIONGAP 15 14 12     Microbiology Results (last 7 days)       Procedure Component Value Units Date/Time    Blood culture #1 **CANNOT BE ORDERED STAT** [[id number]] Collected: 06/21/24 1443    Order Status: Completed Specimen: Blood from Peripheral, Antecubital, Right Updated: 06/26/24 1812     Blood Culture, Routine No growth after 5 days.    Blood culture #2 **CANNOT BE ORDERED STAT** [[id number]] Collected: 06/21/24 1443    Order Status: Completed Specimen: Blood from Peripheral, Antecubital, Right Updated: 06/26/24 1812     Blood Culture, Routine No growth after 5 days.          Recent Lab Results  (Last 5 results in the past 24 hours)        06/27/24  1219   06/27/24  0851   06/27/24  0556   06/27/24  0022   06/26/24  2119        Albumin     1.7   1.8         Anion Gap     12   14         Baso #     0.02           Basophil %     0.2           BUN     70   74         Calcium     7.9   7.8         Calcium Level Ionized   1.06             Chloride      106   105         CO2     23   23         Creatinine     2.8   3.0         Differential Method     Automated           eGFR     15.8   14.6         Eos #     0.0           Eos %     0.1           Glucose     125   135         Gran # (ANC)     10.1           Gran %     79.5           Hematocrit     24.2           Hemoglobin     7.9           Immature Grans (Abs)     0.50  Comment: Mild elevation in immature granulocytes is non specific and   can be seen in a variety of conditions including stress response,   acute inflammation, trauma and pregnancy. Correlation with other   laboratory and clinical findings is essential.             Immature Granulocytes     3.9           Lymph #     1.5           Lymph %     11.7           Magnesium      1.6           MCH     31.7           MCHC     32.6           MCV     97           Mono #     0.6           Mono %     4.6           MPV     11.9           nRBC     0           Phosphorus Level     5.0   5.2              5.1           Platelet Count     159           POCT Glucose 125         150       Potassium     3.4   3.5         RBC     2.49           RDW     21.2           Sodium     141   142         WBC     12.72                                  Significant Imaging:     Imaging Results    None

## 2024-06-27 NOTE — ASSESSMENT & PLAN NOTE
Patient with acute kidney injury/acute renal failure likely due to pre-renal azotemia due to dehydration JOSÉ is currently improving. Baseline creatinine  1.5  - Labs reviewed- Renal function/electrolytes with Estimated Creatinine Clearance: 14.3 mL/min (A) (based on SCr of 2.8 mg/dL (H)). according to latest data. Monitor urine output and serial BMP and adjust therapy as needed. Avoid nephrotoxins and renally dose meds for GFR listed above.

## 2024-06-27 NOTE — PT/OT/SLP PROGRESS
Physical Therapy  Continue Current Plan of Care     Patient Name:  Dahlia Spence   MRN:  [id number]  Admitting Diagnosis:  <principal problem not specified>   Recent Surgery: * No surgery found *    Admit Date: 6/21/2024  Length of Stay: 5 days    Patient not seen on this date, however per chart review pt continues to benefit from acute PT services. PT to follow up as POC allows. Please continue progressive mobility as appropriate.    Will follow up: 6/28/24

## 2024-06-27 NOTE — SUBJECTIVE & OBJECTIVE
Interval History: rash is slightly improving but still there and itchy. Eye crustation improving with topical applications by ophthalmo but requires continued administration. Mentation still altered; not at baseline. No acute events overnight.    Review of Systems  Objective:     Vital Signs (Most Recent):  Temp: 98.1 °F (36.7 °C) (06/27/24 1517)  Pulse: 70 (06/27/24 1517)  Resp: 14 (06/27/24 1517)  BP: (!) 153/73 (06/27/24 1517)  SpO2: 96 % (06/27/24 1517) Vital Signs (24h Range):  Temp:  [97.5 °F (36.4 °C)-98.3 °F (36.8 °C)] 98.1 °F (36.7 °C)  Pulse:  [56-85] 70  Resp:  [14-18] 14  SpO2:  [93 %-98 %] 96 %  BP: (132-156)/(64-93) 153/73     Weight: 84.4 kg (186 lb)  Body mass index is 34.02 kg/m².    Intake/Output Summary (Last 24 hours) at 6/27/2024 1615  Last data filed at 6/27/2024 1200  Gross per 24 hour   Intake 1935 ml   Output 800 ml   Net 1135 ml         Physical Exam  HENT:      Head: Normocephalic and atraumatic.   Cardiovascular:      Rate and Rhythm: Normal rate and regular rhythm.      Pulses: Normal pulses.      Heart sounds: Normal heart sounds.   Pulmonary:      Effort: Pulmonary effort is normal. No respiratory distress.      Breath sounds: Normal breath sounds. No wheezing or rales.   Abdominal:      General: Bowel sounds are normal.      Palpations: Abdomen is soft.   Skin:     Capillary Refill: Capillary refill takes less than 2 seconds.      Findings: Bruising, erythema and rash present.   Neurological:      Mental Status: She is alert. She is disoriented.             Significant Labs: All pertinent labs within the past 24 hours have been reviewed.    Significant Imaging: I have reviewed all pertinent imaging results/findings within the past 24 hours.

## 2024-06-27 NOTE — ASSESSMENT & PLAN NOTE
Chronic, controlled. Latest blood pressure and vitals reviewed-     Temp:  [97.5 °F (36.4 °C)-98.3 °F (36.8 °C)]   Pulse:  [56-85]   Resp:  [14-18]   BP: (132-156)/(64-93)   SpO2:  [93 %-98 %] .   Home meds for hypertension were reviewed and noted below.   Hypertension Medications               lisinopriL-hydrochlorothiazide (PRINZIDE,ZESTORETIC) 20-12.5 mg per tablet TAKE 1 TABLET BY MOUTH EVERY DAY            While in the hospital, will manage blood pressure as follows; Adjust home antihypertensive regimen as follows- HOLD    Will utilize p.r.n. blood pressure medication only if patient's blood pressure greater than 180/110 and she develops symptoms such as worsening chest pain or shortness of breath.

## 2024-06-27 NOTE — PLAN OF CARE
Patient oriented to self only. VSS on RA. Afebrile. AFIB on Tele. Accucheck orders maintained, no coverage given o/n. Severe skin eruption noted over generalized body- cream applied. R knee inc- dsg CDI. Excoriation noted to perineum.  Yuen in place- CAUTI precautions maintained. Pt incontinent of bowel- see I&O for details. Bed bound- max assist. Bed locked and lowered, call bell in reach, green boots in place. Standard precautions maintained. Awaiting SNF placement.

## 2024-06-27 NOTE — SUBJECTIVE & OBJECTIVE
Interval History: NAEON, renal functions improving, still AMS.    Review of patient's allergies indicates:   Allergen Reactions    Zosyn [piperacillin-tazobactam] Rash     DRESS syndrome    Gabapentin Hallucinations     Current Facility-Administered Medications   Medication Frequency    acetaminophen tablet 650 mg Q6H PRN    atorvastatin tablet 10 mg Daily    calcium carbonate 200 mg calcium (500 mg) chewable tablet 500 mg Daily    cetirizine tablet 5 mg Daily    dextrose 10% bolus 125 mL 125 mL PRN    dextrose 10% bolus 250 mL 250 mL PRN    ergocalciferol capsule 50,000 Units Twice Weekly    glucagon (human recombinant) injection 1 mg PRN    glucose chewable tablet 16 g PRN    glucose chewable tablet 24 g PRN    heparin (porcine) injection 5,000 Units Q8H    hydrOXYzine HCL tablet 25 mg TID PRN    insulin aspart U-100 pen 0-5 Units QID (AC + HS) PRN    lactated ringers infusion Continuous    methylPREDNISolone sodium succinate injection 80 mg Daily    mupirocin 2 % ointment BID    naloxone 0.4 mg/mL injection 0.02 mg PRN    QUEtiapine tablet 25 mg QHS    sodium chloride 0.9% flush 10 mL Q12H PRN    sodium chloride 0.9% flush 10 mL Q6H    And    sodium chloride 0.9% flush 10 mL PRN    triamcinolone acetonide 0.1% cream BID    valACYclovir tablet 1,000 mg Daily    white petrolatum 41 % ointment PRN    white petrolatum-mineral oil (SYSTANE NIGHTTIME) ophthalmic ointment BID       Objective:     Vital Signs (Most Recent):  Temp: 98.1 °F (36.7 °C) (06/27/24 1517)  Pulse: 70 (06/27/24 1517)  Resp: 14 (06/27/24 1517)  BP: (!) 153/73 (06/27/24 1517)  SpO2: 96 % (06/27/24 1517) Vital Signs (24h Range):  Temp:  [97.5 °F (36.4 °C)-98.3 °F (36.8 °C)] 98.1 °F (36.7 °C)  Pulse:  [56-85] 70  Resp:  [14-18] 14  SpO2:  [93 %-98 %] 96 %  BP: (132-156)/(64-93) 153/73     Weight: 84.4 kg (186 lb) (06/21/24 1950)  Body mass index is 34.02 kg/m².  Body surface area is 1.92 meters squared.    I/O last 3 completed shifts:  In: 1470  [P.O.:120; I.V.:1250; IV Piggyback:100]  Out: 800 [Urine:800]     Physical Exam  Cardiovascular:      Rate and Rhythm: Normal rate.   Pulmonary:      Effort: Pulmonary effort is normal. No respiratory distress.      Breath sounds: No wheezing or rales.   Musculoskeletal:      Right lower leg: No edema.      Left lower leg: No edema.   Neurological:      Mental Status: She is disoriented.          Significant Labs:  BMP:   Recent Labs   Lab 06/27/24  0556   *      K 3.4*      CO2 23   BUN 70*   CREATININE 2.8*   CALCIUM 7.9*   MG 1.6     CBC:   Recent Labs   Lab 06/27/24  0556   WBC 12.72*   RBC 2.49*   HGB 7.9*   HCT 24.2*      MCV 97   MCH 31.7*   MCHC 32.6        Significant Imaging:  Labs: Reviewed  ECG: Reviewed  X-Ray: Reviewed  US: Reviewed

## 2024-06-27 NOTE — PROGRESS NOTES
[facility]  Nephrology  Progress Note    Patient Name: Dahlia Spence  MRN: [id number]  Admission Date: 6/21/2024  Hospital Length of Stay: 5 days  Attending Provider: Akil Gandhi III*   Primary Care Physician: Frandy Roe MD  Principal Problem:<principal problem not specified>    Subjective:     HPI: 88 YOF with past medical history of CKD3 (baseline creatinine between 1.3-1.5), T2DM, COPD, PVD 2/2 to DM, HTN, HLD, LBP, OA who presented for a skin rash and altered mentation. The patient had a recent I&D (05/16/2024) for her R knee wound and started zosyn. Then 7 days ago she developed full body hives with rash and tongue swelling.       In the ED: patient received fluid resuscitation and IV ceftriaxone. She exhibited signs of minimal skin desqaumation amidst normal vital signs.  Nephrology has been consulted for JOSÉ on CKD. Upon arrival her creatinine on arrival was noted to be 5.0, which is increased from her baseline. Other labs showed severe HAGMA with respiratory acidosis. There does not appear to be a triple acid/base disorder when correcting for her hypoalbuminemia. She was given a bolus of LR upon arrival, UA was collected and showed 1+ ketones, 16 RBCs/ hpf, urine sodium of 19.     Interval History: NAEON, renal functions improving, still AMS.    Review of patient's allergies indicates:   Allergen Reactions    Zosyn [piperacillin-tazobactam] Rash     DRESS syndrome    Gabapentin Hallucinations     Current Facility-Administered Medications   Medication Frequency    acetaminophen tablet 650 mg Q6H PRN    atorvastatin tablet 10 mg Daily    calcium carbonate 200 mg calcium (500 mg) chewable tablet 500 mg Daily    cetirizine tablet 5 mg Daily    dextrose 10% bolus 125 mL 125 mL PRN    dextrose 10% bolus 250 mL 250 mL PRN    ergocalciferol capsule 50,000 Units Twice Weekly    glucagon (human recombinant) injection 1 mg PRN    glucose chewable tablet 16 g PRN    glucose chewable  tablet 24 g PRN    heparin (porcine) injection 5,000 Units Q8H    hydrOXYzine HCL tablet 25 mg TID PRN    insulin aspart U-100 pen 0-5 Units QID (AC + HS) PRN    lactated ringers infusion Continuous    methylPREDNISolone sodium succinate injection 80 mg Daily    mupirocin 2 % ointment BID    naloxone 0.4 mg/mL injection 0.02 mg PRN    QUEtiapine tablet 25 mg QHS    sodium chloride 0.9% flush 10 mL Q12H PRN    sodium chloride 0.9% flush 10 mL Q6H    And    sodium chloride 0.9% flush 10 mL PRN    triamcinolone acetonide 0.1% cream BID    valACYclovir tablet 1,000 mg Daily    white petrolatum 41 % ointment PRN    white petrolatum-mineral oil (SYSTANE NIGHTTIME) ophthalmic ointment BID       Objective:     Vital Signs (Most Recent):  Temp: 98.1 °F (36.7 °C) (06/27/24 1517)  Pulse: 70 (06/27/24 1517)  Resp: 14 (06/27/24 1517)  BP: (!) 153/73 (06/27/24 1517)  SpO2: 96 % (06/27/24 1517) Vital Signs (24h Range):  Temp:  [97.5 °F (36.4 °C)-98.3 °F (36.8 °C)] 98.1 °F (36.7 °C)  Pulse:  [56-85] 70  Resp:  [14-18] 14  SpO2:  [93 %-98 %] 96 %  BP: (132-156)/(64-93) 153/73     Weight: 84.4 kg (186 lb) (06/21/24 1950)  Body mass index is 34.02 kg/m².  Body surface area is 1.92 meters squared.    I/O last 3 completed shifts:  In: 1470 [P.O.:120; I.V.:1250; IV Piggyback:100]  Out: 800 [Urine:800]     Physical Exam  Cardiovascular:      Rate and Rhythm: Normal rate.   Pulmonary:      Effort: Pulmonary effort is normal. No respiratory distress.      Breath sounds: No wheezing or rales.   Musculoskeletal:      Right lower leg: No edema.      Left lower leg: No edema.   Neurological:      Mental Status: She is disoriented.          Significant Labs:  BMP:   Recent Labs   Lab 06/27/24  0556   *      K 3.4*      CO2 23   BUN 70*   CREATININE 2.8*   CALCIUM 7.9*   MG 1.6     CBC:   Recent Labs   Lab 06/27/24  0556   WBC 12.72*   RBC 2.49*   HGB 7.9*   HCT 24.2*      MCV 97   MCH 31.7*   MCHC 32.6         Significant Imaging:  Labs: Reviewed  ECG: Reviewed  X-Ray: Reviewed  US: Reviewed  Assessment/Plan:     Renal/  JOSÉ (acute kidney injury)  Urine microscopy showed course granular cast consistent with ATN. The sample was heavily populated with squamous cells, making identification of WBCs or RBCs difficult however urine dipstick done in our office today did not reveal any WBCs or RBCs which contrast the urine dipstick preformed on arrival. Due to overpopulation of squamous cells in the urine sample, AIN cannot be fully excluded at this time.     Recommendations;  -Renal functions are improving, no need of RRT at this time.  -Needs nutritional build up as very poor P/O intake.  -Avoid nephrotoxic agents (IV contrast, NSAID's, gadolinium contrast, PPI's) if able.   -Maintain MAP above 65 mmHg.   -Strict I's and O's  -Daily renal function panel  -Renally dose all medications        Thank you for your consult. I will sign off. Please contact us if you have any additional questions.    Alem Jones MD  Nephrology  [facility]

## 2024-06-28 LAB
ALBUMIN SERPL BCP-MCNC: 1.8 G/DL (ref 3.5–5.2)
ALBUMIN SERPL BCP-MCNC: 2 G/DL (ref 3.5–5.2)
ANION GAP SERPL CALC-SCNC: 11 MMOL/L (ref 8–16)
ANION GAP SERPL CALC-SCNC: 13 MMOL/L (ref 8–16)
BASOPHILS # BLD AUTO: 0.02 K/UL (ref 0–0.2)
BASOPHILS NFR BLD: 0.1 % (ref 0–1.9)
BUN SERPL-MCNC: 62 MG/DL (ref 8–23)
BUN SERPL-MCNC: 65 MG/DL (ref 8–23)
CALCIUM SERPL-MCNC: 7.8 MG/DL (ref 8.7–10.5)
CALCIUM SERPL-MCNC: 7.9 MG/DL (ref 8.7–10.5)
CHLORIDE SERPL-SCNC: 104 MMOL/L (ref 95–110)
CHLORIDE SERPL-SCNC: 106 MMOL/L (ref 95–110)
CMV IGG SERPL QL IA: REACTIVE
CO2 SERPL-SCNC: 23 MMOL/L (ref 23–29)
CO2 SERPL-SCNC: 25 MMOL/L (ref 23–29)
CREAT SERPL-MCNC: 2.1 MG/DL (ref 0.5–1.4)
CREAT SERPL-MCNC: 2.3 MG/DL (ref 0.5–1.4)
DIFFERENTIAL METHOD BLD: ABNORMAL
EOSINOPHIL # BLD AUTO: 0 K/UL (ref 0–0.5)
EOSINOPHIL NFR BLD: 0.2 % (ref 0–8)
ERYTHROCYTE [DISTWIDTH] IN BLOOD BY AUTOMATED COUNT: 20.9 % (ref 11.5–14.5)
EST. GFR  (NO RACE VARIABLE): 20.1 ML/MIN/1.73 M^2
EST. GFR  (NO RACE VARIABLE): 22.4 ML/MIN/1.73 M^2
GLUCOSE SERPL-MCNC: 105 MG/DL (ref 70–110)
GLUCOSE SERPL-MCNC: 110 MG/DL (ref 70–110)
HCT VFR BLD AUTO: 24.3 % (ref 37–48.5)
HGB BLD-MCNC: 7.8 G/DL (ref 12–16)
IMM GRANULOCYTES # BLD AUTO: 0.44 K/UL (ref 0–0.04)
IMM GRANULOCYTES NFR BLD AUTO: 3 % (ref 0–0.5)
LYMPHOCYTES # BLD AUTO: 1.6 K/UL (ref 1–4.8)
LYMPHOCYTES NFR BLD: 10.9 % (ref 18–48)
MAGNESIUM SERPL-MCNC: 1.5 MG/DL (ref 1.6–2.6)
MCH RBC QN AUTO: 31.6 PG (ref 27–31)
MCHC RBC AUTO-ENTMCNC: 32.1 G/DL (ref 32–36)
MCV RBC AUTO: 98 FL (ref 82–98)
MONOCYTES # BLD AUTO: 0.6 K/UL (ref 0.3–1)
MONOCYTES NFR BLD: 4.3 % (ref 4–15)
NEUTROPHILS # BLD AUTO: 12 K/UL (ref 1.8–7.7)
NEUTROPHILS NFR BLD: 81.5 % (ref 38–73)
NRBC BLD-RTO: 0 /100 WBC
PHOSPHATE SERPL-MCNC: 4.6 MG/DL (ref 2.7–4.5)
PLATELET # BLD AUTO: 164 K/UL (ref 150–450)
PMV BLD AUTO: 11.4 FL (ref 9.2–12.9)
POCT GLUCOSE: 111 MG/DL (ref 70–110)
POCT GLUCOSE: 124 MG/DL (ref 70–110)
POTASSIUM SERPL-SCNC: 3.2 MMOL/L (ref 3.5–5.1)
POTASSIUM SERPL-SCNC: 4.2 MMOL/L (ref 3.5–5.1)
RBC # BLD AUTO: 2.47 M/UL (ref 4–5.4)
SODIUM SERPL-SCNC: 140 MMOL/L (ref 136–145)
SODIUM SERPL-SCNC: 142 MMOL/L (ref 136–145)
WBC # BLD AUTO: 14.68 K/UL (ref 3.9–12.7)

## 2024-06-28 PROCEDURE — 20600001 HC STEP DOWN PRIVATE ROOM

## 2024-06-28 PROCEDURE — 25000003 PHARM REV CODE 250: Performed by: INTERNAL MEDICINE

## 2024-06-28 PROCEDURE — 63600175 PHARM REV CODE 636 W HCPCS

## 2024-06-28 PROCEDURE — 25000003 PHARM REV CODE 250

## 2024-06-28 PROCEDURE — A4216 STERILE WATER/SALINE, 10 ML: HCPCS | Performed by: INTERNAL MEDICINE

## 2024-06-28 PROCEDURE — 97110 THERAPEUTIC EXERCISES: CPT | Mod: CQ

## 2024-06-28 PROCEDURE — 83735 ASSAY OF MAGNESIUM: CPT

## 2024-06-28 PROCEDURE — 84100 ASSAY OF PHOSPHORUS: CPT

## 2024-06-28 PROCEDURE — 97535 SELF CARE MNGMENT TRAINING: CPT | Mod: CO

## 2024-06-28 PROCEDURE — 85025 COMPLETE CBC W/AUTO DIFF WBC: CPT

## 2024-06-28 PROCEDURE — 97112 NEUROMUSCULAR REEDUCATION: CPT | Mod: CO

## 2024-06-28 PROCEDURE — 80069 RENAL FUNCTION PANEL: CPT

## 2024-06-28 PROCEDURE — 97530 THERAPEUTIC ACTIVITIES: CPT | Mod: CQ

## 2024-06-28 RX ORDER — AMLODIPINE BESYLATE 5 MG/1
5 TABLET ORAL DAILY
Status: DISCONTINUED | OUTPATIENT
Start: 2024-06-28 | End: 2024-07-01

## 2024-06-28 RX ORDER — POTASSIUM CHLORIDE 7.45 MG/ML
10 INJECTION INTRAVENOUS
Status: COMPLETED | OUTPATIENT
Start: 2024-06-28 | End: 2024-06-28

## 2024-06-28 RX ADMIN — VALACYCLOVIR HYDROCHLORIDE 1000 MG: 500 TABLET, FILM COATED ORAL at 08:06

## 2024-06-28 RX ADMIN — MINERAL OIL AND WHITE PETROLATUM: 30; 940 OINTMENT OPHTHALMIC at 09:06

## 2024-06-28 RX ADMIN — AMLODIPINE BESYLATE 5 MG: 5 TABLET ORAL at 12:06

## 2024-06-28 RX ADMIN — POTASSIUM CHLORIDE 10 MEQ: 7.46 INJECTION, SOLUTION INTRAVENOUS at 02:06

## 2024-06-28 RX ADMIN — CETIRIZINE HYDROCHLORIDE 5 MG: 5 TABLET, FILM COATED ORAL at 08:06

## 2024-06-28 RX ADMIN — ATORVASTATIN CALCIUM 10 MG: 10 TABLET, FILM COATED ORAL at 08:06

## 2024-06-28 RX ADMIN — TRIAMCINOLONE ACETONIDE: 1 CREAM TOPICAL at 08:06

## 2024-06-28 RX ADMIN — METHYLPREDNISOLONE SODIUM SUCCINATE 80 MG: 125 INJECTION, POWDER, FOR SOLUTION INTRAMUSCULAR; INTRAVENOUS at 08:06

## 2024-06-28 RX ADMIN — HEPARIN SODIUM 5000 UNITS: 5000 INJECTION INTRAVENOUS; SUBCUTANEOUS at 01:06

## 2024-06-28 RX ADMIN — Medication 10 ML: at 05:06

## 2024-06-28 RX ADMIN — MUPIROCIN: 20 OINTMENT TOPICAL at 08:06

## 2024-06-28 RX ADMIN — HEPARIN SODIUM 5000 UNITS: 5000 INJECTION INTRAVENOUS; SUBCUTANEOUS at 08:06

## 2024-06-28 RX ADMIN — POTASSIUM CHLORIDE 10 MEQ: 7.46 INJECTION, SOLUTION INTRAVENOUS at 03:06

## 2024-06-28 RX ADMIN — MINERAL OIL AND WHITE PETROLATUM: 30; 940 OINTMENT OPHTHALMIC at 08:06

## 2024-06-28 RX ADMIN — CALCIUM CARBONATE (ANTACID) CHEW TAB 500 MG 500 MG: 500 CHEW TAB at 08:06

## 2024-06-28 RX ADMIN — QUETIAPINE FUMARATE 25 MG: 25 TABLET ORAL at 08:06

## 2024-06-28 RX ADMIN — POTASSIUM CHLORIDE 10 MEQ: 7.46 INJECTION, SOLUTION INTRAVENOUS at 12:06

## 2024-06-28 RX ADMIN — HEPARIN SODIUM 5000 UNITS: 5000 INJECTION INTRAVENOUS; SUBCUTANEOUS at 05:06

## 2024-06-28 RX ADMIN — POTASSIUM CHLORIDE 10 MEQ: 7.46 INJECTION, SOLUTION INTRAVENOUS at 01:06

## 2024-06-28 RX ADMIN — WHITE PETROLATUM: 1.75 OINTMENT TOPICAL at 08:06

## 2024-06-28 RX ADMIN — WHITE PETROLATUM: 1.75 OINTMENT TOPICAL at 01:06

## 2024-06-28 NOTE — PROGRESS NOTES
[facility] Medicine  Progress Note    Patient Name: Dahlia Spence  MRN: [id number]  Patient Class: IP- Inpatient   Admission Date: 6/21/2024  Length of Stay: 6 days  Attending Physician: Akil Gandhi III*  Primary Care Provider: Frandy Roe MD        Subjective:     Principal Problem:<principal problem not specified>        HPI:  88 YOF w/CKD3, T2DM, COPD, PVD 2/2 to DM, HTN, HLD, LBP, OA who presented for a skin rash. The patient had a recent I&D (05/16/2024) for her R knee wound and started zosyn. Then 7 days ago she developed full body hives with rash and tongue swelling. Family reports AMS and is not at her baseline.     Recent history: right distal femur replacement on 04/29/2024, then I&D of RLE 5/16/24, then dehiscence of the right TKA and underwent a right gastroc flap, split thickness skin graft, and wound VAC on 05/23/2024. Then the patient had a post op infection and had a 10 day course of linezolid to daptomycin. Then she had a second post op infection treated with a 6 week course of zosyn on  05/30/2024 and was seen by ID.     In the ED: patient received fluid resuscitation and IV ceftriaxone. She exhibited signs of minimal skin desqaumation amidst normal vital signs.  WBC 20.56  HGB 12.6     K 3.0  CRT 5.0 (baseline 1.5)    Overview/Hospital Course:  88 YOF w/CKD3, T2DM, COPD, PVD 2/2 to DM, HTN, HLD, LBP, OA who presented for a skin rash.Pt had a recent TKA, fall after OR and femur fracture, requiring ORIF followed by wound infection and complex healing course. Bone cx alpha hemolytic strep. Required ABX therapy guided by ID c/o Linezolid followed by recent initiation of Zosyn therapy. Developed marked drug eruption over the course of the last week. Presents with severe total body eruption, AMS and profound JOSÉ. Wound care, ID, ortho, derm, and nephro following. Concern for DRESS clinically, started on IV and topical steroids. DRESS confirmed on  pathology. Per ortho the patient is too sick for acute interventions, continue wound care only. ID recommends to continue holding antibiotics in the setting of DRESS. Developed an JOSÉ this admission which is likely 2/2 to prerenal, started on HCO3 gtt and transitioned to continuous LR. Started nightly seroquel for AMS 2/2 possibly to delirium from acute illness (neg CTH). Electrolytes being repleted and PT/OT onboard for assessment and management. Patient's AMS is still not at baseline. Skin rash is slowly improving on IV solemedrol, requires prolonged stay in order to improve rash. Eye crustations improving with topical application.    Interval History: patient is more alert today and is able to keep eyes open. Less crustations over eyes. Mentation still altered    Review of Systems  Objective:     Vital Signs (Most Recent):  Temp: 97.7 °F (36.5 °C) (06/28/24 1143)  Pulse: 69 (06/28/24 1143)  Resp: 17 (06/28/24 1143)  BP: (!) 144/63 (06/28/24 1143)  SpO2: (!) 94 % (06/28/24 1143) Vital Signs (24h Range):  Temp:  [97.5 °F (36.4 °C)-97.8 °F (36.6 °C)] 97.7 °F (36.5 °C)  Pulse:  [62-78] 69  Resp:  [17-18] 17  SpO2:  [94 %-98 %] 94 %  BP: (142-183)/(63-82) 144/63     Weight: 84.4 kg (186 lb)  Body mass index is 34.02 kg/m².    Intake/Output Summary (Last 24 hours) at 6/28/2024 1523  Last data filed at 6/28/2024 1433  Gross per 24 hour   Intake 3701.76 ml   Output 1300 ml   Net 2401.76 ml         Physical Exam  Cardiovascular:      Rate and Rhythm: Normal rate and regular rhythm.      Pulses: Normal pulses.      Heart sounds: Normal heart sounds.   Pulmonary:      Effort: Pulmonary effort is normal.      Breath sounds: Normal breath sounds.   Abdominal:      General: Bowel sounds are normal.      Palpations: Abdomen is soft.   Musculoskeletal:      Right lower leg: No edema.      Left lower leg: No edema.   Skin:     Capillary Refill: Capillary refill takes less than 2 seconds.   Neurological:      Mental Status: She is  alert. Mental status is at baseline. She is disoriented.      Motor: No weakness.             Significant Labs: All pertinent labs within the past 24 hours have been reviewed.    Significant Imaging: I have reviewed all pertinent imaging results/findings within the past 24 hours.    Assessment/Plan:      Eye abnormalities  Ophthalmo consultation, follow recs which are comprised of petroleum topical application        Weakness  PT/OT following     Acidosis  HAGMA 2/2 to pre-renal JOSÉ    - was on HCO3 gtt, transitioned to LR   - CMP daily      Hypokalemia    Lab Results   Component Value Date    K 4.1 06/26/2024   . Will continue potassium replacement per protocol and recheck repeat levels after replacement completed.     Metabolic acidosis, increased anion gap  See acidosis      JOSÉ (acute kidney injury)  Patient with acute kidney injury/acute renal failure likely due to pre-renal azotemia due to dehydration JOSÉ is currently improving. Baseline creatinine  1.5  - Labs reviewed- Renal function/electrolytes with Estimated Creatinine Clearance: 14.3 mL/min (A) (based on SCr of 2.8 mg/dL (H)). according to latest data. Monitor urine output and serial BMP and adjust therapy as needed. Avoid nephrotoxins and renally dose meds for GFR listed above.    Open wound of right knee s/p R distal femoral replacement, I&D, and gastrocnemius flap  .Pt had a recent TKA, fall after OR and femur fracture, requiring ORIF followed by wound infection and complex healing course. Bone cx alpha hemolytic strep. Required ABX therapy guided by ID c/o Linezolid followed by recent initiation of Zosyn therapy. No off zosyn.     Plan:  Ortho consultation; recs appreciated  Attempts to transfer patient to [facility] at the same ortho and plastic surgery dept who operated on her in order to continue care for knee wound         DRESS syndrome  Concern for DRESS clinically, started on IV and topical steroids. DRESS confirmed on pathologyID recommends  to continue holding antibiotics in the setting of DRESS.     - antihistamine, topical steroid  - IV methylpred, taper to be determined based off the following labs (each disease state affects treatement course) - TSH, HHV6, CMV, EMV, and HHV7 unable to be ordered   - derm consulted, agree with DRESS, to continue IV solemedrol, follow their recs.          Type 2 diabetes mellitus with other circulatory complications  Low dose ssi as needed prn  HBA1C      Chronic obstructive pulmonary disease, unspecified  Supplemental oxygen  Duo-nebs  Target SPO2 >88%    Chronic kidney disease, stage III (moderate)  Patient's CRT today is 5.0 >>5.4  Baseline in the system is 1.5  Acidosis with ph 7.1  HCO3 is 9  Phos 9    Plan:  - Nephrology consult, now  - urine studies pending     Benign essential hypertension  Chronic, controlled. Latest blood pressure and vitals reviewed-     Temp:  [97.5 °F (36.4 °C)-98.3 °F (36.8 °C)]   Pulse:  [56-85]   Resp:  [14-18]   BP: (132-156)/(64-93)   SpO2:  [93 %-98 %] .   Home meds for hypertension were reviewed and noted below.   Hypertension Medications               lisinopriL-hydrochlorothiazide (PRINZIDE,ZESTORETIC) 20-12.5 mg per tablet TAKE 1 TABLET BY MOUTH EVERY DAY            While in the hospital, will manage blood pressure as follows; Adjust home antihypertensive regimen as follows- HOLD    Will utilize p.r.n. blood pressure medication only if patient's blood pressure greater than 180/110 and she develops symptoms such as worsening chest pain or shortness of breath.      VTE Risk Mitigation (From admission, onward)           Ordered     heparin (porcine) injection 5,000 Units  Every 8 hours         06/21/24 1803     IP VTE HIGH RISK PATIENT  Once         06/21/24 1803     Place sequential compression device  Until discontinued         06/21/24 1803                    Discharge Planning   ACE: 7/1/2024     Code Status: DNR   Is the patient medically ready for discharge?:     Reason for  patient still in hospital (select all that apply): Treatment  Discharge Plan A: Skilled Nursing Facility   Discharge Delays: None known at this time              Oliva Harp MD  Department of Hospital Medicine   [facility]

## 2024-06-28 NOTE — PLAN OF CARE
[facility]  Discharge Reassessment    Primary Care Provider: Frandy Roe MD    Expected Discharge Date: 7/1/2024    Reassessment (most recent)       Discharge Reassessment - 06/28/24 0936          Discharge Reassessment    Assessment Type Discharge Planning Brief Assessment     Did the patient's condition or plan change since previous assessment? No     Discharge Plan discussed with: Adult children     Communicated ACE with patient/caregiver Date not available/Unable to determine     Discharge Plan A Skilled Nursing Facility     Discharge Plan B Skilled Nursing Facility     DME Needed Upon Discharge  none     Why the patient remains in the hospital Requires continued medical care        Post-Acute Status    Discharge Delays None known at this time                   88 YOF w/CKD3, T2DM, COPD, PVD 2/2 to DM, HTN, HLD, LBP, OA who presented for a skin rash.Pt had a recent TKA, fall after OR and femur fracture, requiring ORIF followed by wound infection and complex healing course. Bone cx alpha hemolytic strep. Required ABX therapy guided by ID c/o Linezolid followed by recent initiation of Zosyn therapy. Developed marked drug eruption over the course of the last week. Presents with severe total body eruption, AMS and profound JOSÉ. Wound care, ID, ortho, derm, and nephro following. Concern for DRESS clinically, started on IV and topical steroids. DRESS confirmed on pathology. Per ortho the patient is too sick for acute interventions, continue wound care only. ID recommends to continue holding antibiotics in the setting of DRESS. Developed an JOSÉ this admission which is likely 2/2 to prerenal, started on HCO3 gtt and transitioned to continuous LR. Started nightly seroquel for AMS 2/2 possibly to delirium from acute illness (neg CTH). Electrolytes being repleted and PT/OT onboard for assessment and management. Patient's AMS is still not at baseline. Skin rash is slowly improving on IV solemedrol,  requires prolonged stay in order to improve rash.        Interval History: rash is slightly improving but still there and itchy. Eye crustation improving with topical applications by ophthalmo but requires continued administration. Mentation still altered; not at baseline. No acute events overnight.  Will continue to update plan as needed.  Juan Jean RN,BSN

## 2024-06-28 NOTE — PROGRESS NOTES
Nurses Note -- 4 Eyes      6/28/2024   12:59 PM      Skin assessed during: Daily Assessment      [] No Altered Skin Integrity Present    []Prevention Measures Documented      [x] Yes- Altered Skin Integrity Present or Discovered   [] LDA Added if Not in Epic (Describe Wound)   [] New Altered Skin Integrity was Present on Admit and Documented in LDA   [] Wound Image Taken    Wound Care Consulted? Yes    Attending Nurse:  Stormy Gomes RN/Staff Member:  [place]

## 2024-06-28 NOTE — PT/OT/SLP PROGRESS
Occupational Therapy   Co-Treatment with PT  Co-treatment performed due to patient's multiple deficits requiring two skilled therapists to appropriately and safely assess patient's strength and endurance while facilitating functional tasks in addition to accommodating for patient's activity tolerance.     Name: Dahlia Spence  MRN: [id number]  Admitting Diagnosis:  <principal problem not specified>       Recommendations:     Discharge Recommendations: Moderate Intensity Therapy  Discharge Equipment Recommendations:  hospital bed  Barriers to discharge:       Assessment:     Dahlia Spence is a 88 y.o. female with a medical diagnosis of <principal problem not specified>.  She presents with the following performance deficits affecting function: weakness, gait instability, impaired balance, impaired endurance, impaired cognition, impaired functional mobility, impaired self care skills, decreased coordination, impaired skin, orthopedic precautions, decreased lower extremity function, decreased upper extremity function.     Rehab Prognosis:  Good; patient would benefit from acute skilled OT services to address these deficits and reach maximum level of function.       Plan:     Patient to be seen 4 x/week to address the above listed problems via neuromuscular re-education, self-care/home management, therapeutic activities, therapeutic exercises  Plan of Care Expires: 07/25/24  Plan of Care Reviewed with: patient    Subjective     Chief Complaint: itchy skin  Patient/Family Comments/goals: to improve function  Pain/Comfort:  Pain Rating 1: 0/10  Pain Rating Post-Intervention 1: 0/10    Objective:     Communicated with: RN prior to session.  Patient found HOB elevated with telemetry, peripheral IV, restraints, rueda catheter upon OT entry to room.    General Precautions: Standard, fall    Orthopedic Precautions:RLE non weight bearing  Braces: Knee immobilizer  Respiratory Status: Room air     Occupational Performance:      Bed Mobility:    Patient completed Scooting/Bridging with total assistance  Patient completed Supine to Sit with maximal assistance and 2 persons  Patient completed Sit to Supine with maximal assistance and 2 persons     Functional Mobility/Transfers:  Not performed this date    Activities of Daily Living:  Grooming: minimum assistance the apply toothpaste and place face towel in hand      [facility] 6 Click ADL: 11    Treatment & Education:  Pt educated on OT POC and frequency during hospital stay.   Pt sat EOB with CGA to complete grooming and therex.   Bathing patients back with bath wipes to decrease itchiness  Addressed all patient questions/concerns within [facility] scope of practice.     Patient left HOB elevated with all lines intact, call button in reach, and RN notified    GOALS:   Multidisciplinary Problems       Occupational Therapy Goals          Problem: Occupational Therapy    Goal Priority Disciplines Outcome Interventions   Occupational Therapy Goal     OT, PT/OT Progressing    Description: Goals to be met by: 7/2/2024     Patient will increase functional independence with ADLs by performing:    UE Dressing with Moderate Assistance.  LE Dressing with Maximum Assistance.  Grooming while EOB with Maximum Assistance.  Toileting from bedside commode with Maximum Assistance for hygiene and clothing management.   Toilet transfer to bedside commode with Maximum Assistance.    DME Justifications     Hospital Bed ·       Patient requires a hospital bed for positioning of the body in ways that are not feasible with an ordinary bed. The patient requires special positioning for pain relief, limited mobility, and/or being unable to independently make changes in body position without the use of a hospital bed. Pillows and wedges will not be adequate for resolving these positional issues.                              Time Tracking:     OT Date of Treatment: 06/28/24  OT Start Time: 0937  OT Stop Time: 1006  OT Total  Time (min): 29 min    Billable Minutes:Self Care/Home Management 10  Neuromuscular Re-education 19    OT/MANDI: MANDI     Number of MANDI visits since last OT visit: 2    6/28/2024

## 2024-06-28 NOTE — ASSESSMENT & PLAN NOTE
.Pt had a recent TKA, fall after OR and femur fracture, requiring ORIF followed by wound infection and complex healing course. Bone cx alpha hemolytic strep. Required ABX therapy guided by ID c/o Linezolid followed by recent initiation of Zosyn therapy. No off zosyn.     Plan:  Ortho consultation; recs appreciated  Attempts to transfer patient to [facility] at the same ortho and plastic surgery dept who operated on her in order to continue care for knee wound

## 2024-06-28 NOTE — PLAN OF CARE
Patient only oriented to self today, pleasant and follows commands. Bed bound. Incontinent of stool, rueda intact draining clear yellow urine. Skin dry and peeling, creams applied per order, cream applied to lips. Vitals stable, K replaced. Daughter at bedside today. Appetite poor.

## 2024-06-28 NOTE — SUBJECTIVE & OBJECTIVE
Interval History: patient is more alert today and is able to keep eyes open. Less crustations over eyes. Mentation still altered    Review of Systems  Objective:     Vital Signs (Most Recent):  Temp: 97.7 °F (36.5 °C) (06/28/24 1143)  Pulse: 69 (06/28/24 1143)  Resp: 17 (06/28/24 1143)  BP: (!) 144/63 (06/28/24 1143)  SpO2: (!) 94 % (06/28/24 1143) Vital Signs (24h Range):  Temp:  [97.5 °F (36.4 °C)-97.8 °F (36.6 °C)] 97.7 °F (36.5 °C)  Pulse:  [62-78] 69  Resp:  [17-18] 17  SpO2:  [94 %-98 %] 94 %  BP: (142-183)/(63-82) 144/63     Weight: 84.4 kg (186 lb)  Body mass index is 34.02 kg/m².    Intake/Output Summary (Last 24 hours) at 6/28/2024 1523  Last data filed at 6/28/2024 1433  Gross per 24 hour   Intake 3701.76 ml   Output 1300 ml   Net 2401.76 ml         Physical Exam  Cardiovascular:      Rate and Rhythm: Normal rate and regular rhythm.      Pulses: Normal pulses.      Heart sounds: Normal heart sounds.   Pulmonary:      Effort: Pulmonary effort is normal.      Breath sounds: Normal breath sounds.   Abdominal:      General: Bowel sounds are normal.      Palpations: Abdomen is soft.   Musculoskeletal:      Right lower leg: No edema.      Left lower leg: No edema.   Skin:     Capillary Refill: Capillary refill takes less than 2 seconds.   Neurological:      Mental Status: She is alert. Mental status is at baseline. She is disoriented.      Motor: No weakness.             Significant Labs: All pertinent labs within the past 24 hours have been reviewed.    Significant Imaging: I have reviewed all pertinent imaging results/findings within the past 24 hours.

## 2024-06-28 NOTE — PROGRESS NOTES
Dermatology Consult Progress Note    Assessment:  Currently admitted for DRESS. Cutaneous as well as systemic findings overall improving at this time. 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     Recommendations:  Continue solumedrol 80 mg IV daily.   Continue valtrex 1 g BID to complete a 5 day course  Continue Aquaphor BID to the mouth, gentle moisturizers at the bed side for skin exfoliation, and lubricating ophthalmic ointment BID for eyes as recommended by ophthalmology  CMV PCR slightly elevated and IgG positive, IgM pending. If IgM returns and is also positive, would consider consulting ID regarding the relevance of this. However, given that the patient clinically is improving, there is low concern for CMV reactivation at this time.   Follow-up [facility] serology  Continue trending WBC given her increasing leukocytosis. This may be reactive from steroids, though the spike did seem to parallel when her steroid dose was decreased. Furthermore, she also has several risk factors for an infection.   Recommend continuing inpatient monitoring and treatment given the multiple risk factors that this patient has in addition to her social situation. If she does get transferred to a different facility, would strongly recommend one that has a dermatology service that can continue to follow the patient      Subjective:  Patient seen and examined this morning. History given with assistance from the patient's family member at the bedside. Overall is much improved and has not complained of itching in the last 24 hours. Rash and mucositis also improving. WBC mildly trending back up, though additional lab findings and vital signs remain stable, with ongoing improvement in renal function.     Exam:  Notable improvement compared to prior images. Skin findings at this time appear to be predominantly secondary exfoliation, as expected. Mucositis also appears to be subsiding.

## 2024-06-28 NOTE — NURSING
Nurses Note -- 4 Eyes      6/28/2024   6:39 AM      Skin assessed during: Daily Assessment      [] No Altered Skin Integrity Present    []Prevention Measures Documented      [x] Yes- Altered Skin Integrity Present or Discovered   [] LDA Added if Not in Epic (Describe Wound)   [] New Altered Skin Integrity was Present on Admit and Documented in LDA   [] Wound Image Taken    Wound Care Consulted? Yes    Attending Nurse:  Stormy Gomes RN/Staff Member:  LINDSEY Bradford

## 2024-06-28 NOTE — PT/OT/SLP PROGRESS
"Physical Therapy Co-Treatment    Patient Name:  Dahlia Spence   MRN:  [id number]    Recommendations:     Discharge Recommendations: Moderate Intensity Therapy  Discharge Equipment Recommendations: hospital bed, lift device, wheelchair  Barriers to discharge: Inaccessible home and Decreased caregiver support    Assessment:     Dahlia Spence is a 88 y.o. female admitted with a medical diagnosis of <principal problem not specified>.  She presents with the following impairments/functional limitations: weakness, impaired endurance, impaired self care skills, impaired functional mobility, gait instability, impaired balance, impaired cognition, decreased lower extremity function, decreased safety awareness, impaired cardiopulmonary response to activity, impaired skin Pt was agreeable and willing to participate in therapy services. Pt required high levels of assist for bed mobility but was able to sit EOB without much support to trunk. Pt was able to dual task in EOB sitting with LOB. Pt is improving in skilled PT despite decreased cognition and mild confusion.     Co-treatment performed due to patient's multiple deficits requiring two skilled therapists to appropriately and safely assess patient's strength and endurance while facilitating functional tasks in addition to accommodating for patient's activity tolerance.    Rehab Prognosis: Fair and Poor; patient would benefit from acute skilled PT services to address these deficits and reach maximum level of function.    Recent Surgery: * No surgery found *      Plan:     During this hospitalization, patient to be seen 3 x/week to address the identified rehab impairments via gait training, neuromuscular re-education, therapeutic exercises, therapeutic activities and progress toward the following goals:    Plan of Care Expires:  07/26/24    Subjective     Chief Complaint: "I'm not having any pain, I am itchy."  Patient/Family Comments/goals: None stated  Pain/Comfort:  Pain " Rating 1: 0/10      Objective:     Communicated with nursing (Stormy) prior to session.  Patient found supine with telemetry, rueda catheter, peripheral IV upon PT entry to room.     General Precautions: Standard, fall  Orthopedic Precautions: RUE non weight bearing  Braces: Knee immobilizer  Respiratory Status: Room air     Functional Mobility:  Bed Mobility:     Rolling Left:  total assistance  Rolling Right: maximal assistance x2  Scooting: total assistance and of 2 persons to HOB  Supine to Sit: maximal assistance and of 2 persons  Sit to Supine: maximal assistance and of 2 persons  Balance: Static & Dynamaic Seated EOB sitting ~20 minutes: CGA (Mild posterior lean when performing hip flexion in sitting but self support with RUE on bed rail)  LE mobility/exercises: Seated LAQ on LLE x10, Seated hip flexion x10 BLE's, Supine AP's x20 BLE's      AM-PAC 6 CLICK MOBILITY  Turning over in bed (including adjusting bedclothes, sheets and blankets)?: 2  Sitting down on and standing up from a chair with arms (e.g., wheelchair, bedside commode, etc.): 1  Moving from lying on back to sitting on the side of the bed?: 2  Moving to and from a bed to a chair (including a wheelchair)?: 1  Need to walk in hospital room?: 1  Climbing 3-5 steps with a railing?: 1  Basic Mobility Total Score: 8       Treatment & Education:  Pt was educated on the following:  -Purpose, Benefits, and Goals of today's session  -Scope of PTA and PT services   -Current assistance level needed in sitting  -Purpose of LE exercises to reduce DVT/PE risk  -Progress made from previous session  -Decreased skin integrity and reducing itching    Patient left HOB elevated with all lines intact, call button in reach, and nursing present..    GOALS:   Multidisciplinary Problems       Physical Therapy Goals          Problem: Physical Therapy    Goal Priority Disciplines Outcome Goal Variances Interventions   Physical Therapy Goal     PT, PT/OT Progressing      Description: Goals to be met by:      Patient will increase functional independence with mobility by performin. Supine to sit with Maximum Assistance  2. Sit to supine with Maximum Assistance  3. Rolling to Left and Right with Maximum Assistance.  4. Bed to chair transfer with Maximum Assistance using LRAD  5. Lateral scooting in sitting with maximum assistance.   6. Sitting at edge of bed x8 minutes with Contact Guard Assistance  7. Sit to stand with maximum assistance x2 LRAD                         Time Tracking:     PT Received On: 24  PT Start Time: 937     PT Stop Time: 1006  PT Total Time (min): 29 min     Billable Minutes: Therapeutic Activity 16 and Therapeutic Exercise 13    Treatment Type: Treatment  PT/PTA: PTA     Number of PTA visits since last PT visit: 2024

## 2024-06-28 NOTE — PLAN OF CARE
Patient awake and alert, oriented to self only. VSS on RA. Afebrile. AFIB on Tele (rate controlled). Accucheck orders maintained, no coverage given o/n. LR infusing @ 125cc/hr w/o issues to JYOTI midline this shift. Severe skin eruption noted over generalized body- cream applied. R knee inc- dsg CDI. Excoriation noted to perineum- improving, barrier cream applied.  Yuen in place- CAUTI precautions maintained. Pt incontinent of bowel- see I&O for details. Bed bound- max assist. Bed locked and lowered, call bell in reach, green boots in place. Standard precautions maintained. Awaiting possible SNF placement.

## 2024-06-29 LAB
ALBUMIN SERPL BCP-MCNC: 1.8 G/DL (ref 3.5–5.2)
ANION GAP SERPL CALC-SCNC: 14 MMOL/L (ref 8–16)
BASOPHILS # BLD AUTO: 0.02 K/UL (ref 0–0.2)
BASOPHILS NFR BLD: 0.1 % (ref 0–1.9)
BUN SERPL-MCNC: 57 MG/DL (ref 8–23)
CALCIUM SERPL-MCNC: 7.4 MG/DL (ref 8.7–10.5)
CHLORIDE SERPL-SCNC: 108 MMOL/L (ref 95–110)
CMV IGM SERPL IA-ACNC: <8 AU/ML
CO2 SERPL-SCNC: 21 MMOL/L (ref 23–29)
CREAT SERPL-MCNC: 2 MG/DL (ref 0.5–1.4)
DIFFERENTIAL METHOD BLD: ABNORMAL
EOSINOPHIL # BLD AUTO: 0.1 K/UL (ref 0–0.5)
EOSINOPHIL NFR BLD: 0.6 % (ref 0–8)
ERYTHROCYTE [DISTWIDTH] IN BLOOD BY AUTOMATED COUNT: 20.4 % (ref 11.5–14.5)
EST. GFR  (NO RACE VARIABLE): 23.7 ML/MIN/1.73 M^2
GLUCOSE SERPL-MCNC: 86 MG/DL (ref 70–110)
HCT VFR BLD AUTO: 23.2 % (ref 37–48.5)
HGB BLD-MCNC: 8 G/DL (ref 12–16)
HHV6 DNA # SPEC NAA+PROBE: <1000 CPY/ML
HHV6 DNA SPEC NAA+PROBE-LOG#: <3 LOG
HHV6 DNA SPEC NAA+PROBE: NORMAL
HHV6 IGG+IGM SER-IMP: NOT DETECTED
IMM GRANULOCYTES # BLD AUTO: 0.43 K/UL (ref 0–0.04)
IMM GRANULOCYTES NFR BLD AUTO: 2.7 % (ref 0–0.5)
LYMPHOCYTES # BLD AUTO: 2.3 K/UL (ref 1–4.8)
LYMPHOCYTES NFR BLD: 13.9 % (ref 18–48)
MAGNESIUM SERPL-MCNC: 1.4 MG/DL (ref 1.6–2.6)
MCH RBC QN AUTO: 32.5 PG (ref 27–31)
MCHC RBC AUTO-ENTMCNC: 34.5 G/DL (ref 32–36)
MCV RBC AUTO: 94 FL (ref 82–98)
MONOCYTES # BLD AUTO: 0.6 K/UL (ref 0.3–1)
MONOCYTES NFR BLD: 3.6 % (ref 4–15)
NEUTROPHILS # BLD AUTO: 12.8 K/UL (ref 1.8–7.7)
NEUTROPHILS NFR BLD: 79.1 % (ref 38–73)
NRBC BLD-RTO: 0 /100 WBC
PHOSPHATE SERPL-MCNC: 4.3 MG/DL (ref 2.7–4.5)
PHOSPHATE SERPL-MCNC: 4.3 MG/DL (ref 2.7–4.5)
PLATELET # BLD AUTO: 161 K/UL (ref 150–450)
PMV BLD AUTO: 12.5 FL (ref 9.2–12.9)
POCT GLUCOSE: 111 MG/DL (ref 70–110)
POTASSIUM SERPL-SCNC: 4.4 MMOL/L (ref 3.5–5.1)
RBC # BLD AUTO: 2.46 M/UL (ref 4–5.4)
SODIUM SERPL-SCNC: 143 MMOL/L (ref 136–145)
WBC # BLD AUTO: 16.15 K/UL (ref 3.9–12.7)

## 2024-06-29 PROCEDURE — 25000003 PHARM REV CODE 250

## 2024-06-29 PROCEDURE — 83735 ASSAY OF MAGNESIUM: CPT

## 2024-06-29 PROCEDURE — 63600175 PHARM REV CODE 636 W HCPCS

## 2024-06-29 PROCEDURE — 84100 ASSAY OF PHOSPHORUS: CPT

## 2024-06-29 PROCEDURE — 85025 COMPLETE CBC W/AUTO DIFF WBC: CPT

## 2024-06-29 PROCEDURE — 80069 RENAL FUNCTION PANEL: CPT

## 2024-06-29 PROCEDURE — 20600001 HC STEP DOWN PRIVATE ROOM

## 2024-06-29 RX ORDER — MAGNESIUM SULFATE HEPTAHYDRATE 40 MG/ML
2 INJECTION, SOLUTION INTRAVENOUS ONCE
Status: COMPLETED | OUTPATIENT
Start: 2024-06-29 | End: 2024-06-29

## 2024-06-29 RX ADMIN — METHYLPREDNISOLONE SODIUM SUCCINATE 80 MG: 125 INJECTION, POWDER, FOR SOLUTION INTRAMUSCULAR; INTRAVENOUS at 08:06

## 2024-06-29 RX ADMIN — ATORVASTATIN CALCIUM 10 MG: 10 TABLET, FILM COATED ORAL at 08:06

## 2024-06-29 RX ADMIN — AMLODIPINE BESYLATE 5 MG: 5 TABLET ORAL at 08:06

## 2024-06-29 RX ADMIN — SODIUM CHLORIDE, POTASSIUM CHLORIDE, SODIUM LACTATE AND CALCIUM CHLORIDE: 600; 310; 30; 20 INJECTION, SOLUTION INTRAVENOUS at 06:06

## 2024-06-29 RX ADMIN — MAGNESIUM SULFATE 2 G: 2 INJECTION INTRAVENOUS at 12:06

## 2024-06-29 RX ADMIN — VALACYCLOVIR HYDROCHLORIDE 1000 MG: 500 TABLET, FILM COATED ORAL at 08:06

## 2024-06-29 RX ADMIN — WHITE PETROLATUM: 1.75 OINTMENT TOPICAL at 08:06

## 2024-06-29 RX ADMIN — QUETIAPINE FUMARATE 25 MG: 25 TABLET ORAL at 08:06

## 2024-06-29 RX ADMIN — MINERAL OIL AND WHITE PETROLATUM: 30; 940 OINTMENT OPHTHALMIC at 08:06

## 2024-06-29 RX ADMIN — HEPARIN SODIUM 5000 UNITS: 5000 INJECTION INTRAVENOUS; SUBCUTANEOUS at 02:06

## 2024-06-29 RX ADMIN — MUPIROCIN: 20 OINTMENT TOPICAL at 08:06

## 2024-06-29 RX ADMIN — HEPARIN SODIUM 5000 UNITS: 5000 INJECTION INTRAVENOUS; SUBCUTANEOUS at 06:06

## 2024-06-29 RX ADMIN — TRIAMCINOLONE ACETONIDE: 1 CREAM TOPICAL at 08:06

## 2024-06-29 RX ADMIN — HYDROXYZINE HYDROCHLORIDE 25 MG: 25 TABLET ORAL at 09:06

## 2024-06-29 RX ADMIN — CETIRIZINE HYDROCHLORIDE 5 MG: 5 TABLET, FILM COATED ORAL at 08:06

## 2024-06-29 RX ADMIN — CALCIUM CARBONATE (ANTACID) CHEW TAB 500 MG 500 MG: 500 CHEW TAB at 08:06

## 2024-06-29 RX ADMIN — HEPARIN SODIUM 5000 UNITS: 5000 INJECTION INTRAVENOUS; SUBCUTANEOUS at 08:06

## 2024-06-29 NOTE — ASSESSMENT & PLAN NOTE
Patient with acute kidney injury/acute renal failure likely due to pre-renal azotemia due to dehydration JOSÉ is currently improving. Baseline creatinine  1.5  - Labs reviewed- Renal function/electrolytes with Estimated Creatinine Clearance: 20 mL/min (A) (based on SCr of 2 mg/dL (H)). according to latest data. Monitor urine output and serial BMP and adjust therapy as needed. Avoid nephrotoxins and renally dose meds for GFR listed above.

## 2024-06-29 NOTE — PROGRESS NOTES
Nurses Note -- 4 Eyes      6/29/2024   8:45 AM      Skin assessed during: Daily Assessment      [] No Altered Skin Integrity Present    []Prevention Measures Documented      [x] Yes- Altered Skin Integrity Present or Discovered   [] LDA Added if Not in Epic (Describe Wound)   [] New Altered Skin Integrity was Present on Admit and Documented in LDA   [] Wound Image Taken    Wound Care Consulted? Yes    Attending Nurse:  Stormy Gomes RN/Staff Member:  humberto

## 2024-06-29 NOTE — PLAN OF CARE
"Patient more oriented at times throughout the day, oriented to self and "in the hospital", still in the 1960s, but aware of the plan to transfer to another hospital because of her knee.  Patients daughter at the bedside this afternoon, VERY attentive to the patient. Patients appetite poor. Fluids dcd today. Yuen with clear yellow 350 UO today, no bm noted today. Patients skin continues to slough, dry but improved, creams applied. Ointemnat applied to eyes and lips. CT ordered of knee, awaiting for CT to call for patient. No complaints of pain. Excoriations noted to buttocks, barrier applied. Delirium precautions started, lights on, shades lifted.  "

## 2024-06-29 NOTE — PROGRESS NOTES
[facility] - Transplant [facility] Medicine  Progress Note    Patient Name: Dahlia Spence  MRN: [id number]  Patient Class: IP- Inpatient   Admission Date: 6/21/2024  Length of Stay: 7 days  Attending Physician: Akil Gandhi III*  Primary Care Provider: Frandy Roe MD        Subjective:     Principal Problem:DRESS syndrome        HPI:  88 YOF w/CKD3, T2DM, COPD, PVD 2/2 to DM, HTN, HLD, LBP, OA who presented for a skin rash. The patient had a recent I&D (05/16/2024) for her R knee wound and started zosyn. Then 7 days ago she developed full body hives with rash and tongue swelling. Family reports AMS and is not at her baseline.     Recent history: right distal femur replacement on 04/29/2024, then I&D of RLE 5/16/24, then dehiscence of the right TKA and underwent a right gastroc flap, split thickness skin graft, and wound VAC on 05/23/2024. Then the patient had a post op infection and had a 10 day course of linezolid to daptomycin. Then she had a second post op infection treated with a 6 week course of zosyn on  05/30/2024 and was seen by ID.     In the ED: patient received fluid resuscitation and IV ceftriaxone. She exhibited signs of minimal skin desqaumation amidst normal vital signs.  WBC 20.56  HGB 12.6     K 3.0  CRT 5.0 (baseline 1.5)    Overview/Hospital Course:  88 YOF w/CKD3, T2DM, COPD, PVD 2/2 to DM, HTN, HLD, LBP, OA who presented for a skin rash.Pt had a recent TKA, fall after OR and femur fracture, requiring ORIF followed by wound infection and complex healing course. Bone cx alpha hemolytic strep. Required ABX therapy guided by ID c/o Linezolid followed by recent initiation of Zosyn therapy. Developed marked drug eruption over the course of the last week. Presents with severe total body eruption, AMS and profound JOSÉ. Wound care, ID, ortho, derm, and nephro following. Concern for DRESS clinically, started on IV and topical steroids. DRESS confirmed on pathology. Per  ortho the patient is too sick for acute interventions, continue wound care only. ID recommends to continue holding antibiotics in the setting of DRESS. Developed an JOSÉ this admission which is likely 2/2 to prerenal, started on HCO3 gtt and transitioned to continuous LR. Started nightly seroquel for AMS 2/2 possibly to delirium from acute illness (neg CTH). Electrolytes being repleted and PT/OT onboard for assessment and management.  Eye crustations improving with topical application. Patient's AMS is still not at baseline. Skin rash is slowly improving on IV solemedrol, requires prolonged stay in order to improve rash and to address prosthetic joint infection. Worsening UE swelling bilaterally, stopped IVF for JOSÉ and obtained bilateral UE US. Also obtaining more knee imaging to better characterize prosthetic joint infection. Transfer to  pending.     Interval History: improving rash, worsening bilateral UE swelling. Imaging ordered.    Review of Systems  Objective:     Vital Signs (Most Recent):  Temp: 97.9 °F (36.6 °C) (06/29/24 1150)  Pulse: 67 (06/29/24 1459)  Resp: 17 (06/29/24 1150)  BP: (!) 159/69 (06/29/24 1150)  SpO2: 100 % (06/29/24 1150) Vital Signs (24h Range):  Temp:  [97.5 °F (36.4 °C)-98 °F (36.7 °C)] 97.9 °F (36.6 °C)  Pulse:  [47-78] 67  Resp:  [16-18] 17  SpO2:  [95 %-100 %] 100 %  BP: (110-176)/(69-94) 159/69     Weight: 84.4 kg (186 lb)  Body mass index is 34.02 kg/m².    Intake/Output Summary (Last 24 hours) at 6/29/2024 1536  Last data filed at 6/29/2024 1250  Gross per 24 hour   Intake 1304.37 ml   Output 825 ml   Net 479.37 ml         Physical Exam  Cardiovascular:      Rate and Rhythm: Normal rate and regular rhythm.      Pulses: Normal pulses.      Heart sounds: Normal heart sounds.   Pulmonary:      Effort: Pulmonary effort is normal.      Breath sounds: Normal breath sounds.   Abdominal:      General: Bowel sounds are normal.      Palpations: Abdomen is soft.   Musculoskeletal:       Right lower leg: No edema.      Left lower leg: No edema.      Comments: Bilateral UE swelling    Skin:     Capillary Refill: Capillary refill takes less than 2 seconds.   Neurological:      Mental Status: She is alert. Mental status is at baseline. She is disoriented.      Motor: No weakness.             Significant Labs: All pertinent labs within the past 24 hours have been reviewed.    Significant Imaging: I have reviewed all pertinent imaging results/findings within the past 24 hours.    Assessment/Plan:      * DRESS syndrome  Concern for DRESS clinically, started on IV and topical steroids. DRESS confirmed on pathologyID recommends to continue holding antibiotics in the setting of DRESS.     - antihistamine, topical steroid  - IV methylpred, will reach out to derm regarding taper length  - derm consulted, agree with DRESS, to continue IV solemedrol, follow their recs.          Eye abnormalities  Ophthal consulted, continue to  follow recs which are comprised of petroleum topical application        Weakness  PT/OT following     Acidosis  HAGMA 2/2 to pre-renal JOSÉ    - was on HCO3 gtt, transitioned to LR; holding IVF in the setting of bilateral UE swelling and Cr returning to BL   - [facility] daily      Hypokalemia    Lab Results   Component Value Date    K 4.4 06/29/2024   . Will continue potassium replacement per protocol and recheck repeat levels after replacement completed.     Metabolic acidosis, increased anion gap  See acidosis      JOSÉ (acute kidney injury)  Patient with acute kidney injury/acute renal failure likely due to pre-renal azotemia due to dehydration JOSÉ is currently improving. Baseline creatinine  1.5  - Labs reviewed- Renal function/electrolytes with Estimated Creatinine Clearance: 20 mL/min (A) (based on SCr of 2 mg/dL (H)). according to latest data. Monitor urine output and serial BMP and adjust therapy as needed. Avoid nephrotoxins and renally dose meds for GFR listed above.    Open wound of  right knee s/p R distal femoral replacement, I&D, and gastrocnemius flap  .Pt had a recent TKA, fall after OR and femur fracture, requiring ORIF followed by wound infection and complex healing course. Bone cx alpha hemolytic strep. Required ABX therapy guided by ID c/o Linezolid followed by recent initiation of Zosyn therapy. off zosyn.     Plan:  - Ortho consulted, [facility] ortho team would like patient transferred to  under primary surgeon. If pt is unable to be safely transferred then ortho at [facility] will address her knee  - continue to be off antibiotics per ID  - waiting for records from [facility]   - Attempts to transfer patient to [facility] at the same ortho and plastic surgery dept who operated on her in order to continue care for knee wound         Type 2 diabetes mellitus with other circulatory complications  Low dose ssi as needed prn  HBA1C      Chronic obstructive pulmonary disease, unspecified  Supplemental oxygen  Duo-nebs  Target SPO2 >88%    Chronic kidney disease, stage III (moderate)  JOSÉ on CKD 2/2 to pre-renal JOSÉ s/p HCO3 gtt and IVF    Plan:  - Nephrology consulted, Cr returning to      Benign essential hypertension  - hold home BP meds in setting of JOSÉ, restart when able       VTE Risk Mitigation (From admission, onward)           Ordered     heparin (porcine) injection 5,000 Units  Every 8 hours         06/21/24 1803     IP VTE HIGH RISK PATIENT  Once         06/21/24 1803     Place sequential compression device  Until discontinued         06/21/24 1803                    Discharge Planning   ACE: 7/2/2024     Code Status: DNR   Is the patient medically ready for discharge?:     Reason for patient still in hospital (select all that apply): Patient trending condition  Discharge Plan A: Hospital Transfer   Discharge Delays: None known at this time        Kimmie Hoyt DO  Department of Hospital Medicine   [facility] - Transplant Stepdown

## 2024-06-29 NOTE — ASSESSMENT & PLAN NOTE
.Pt had a recent TKA, fall after OR and femur fracture, requiring ORIF followed by wound infection and complex healing course. Bone cx alpha hemolytic strep. Required ABX therapy guided by ID c/o Linezolid followed by recent initiation of Zosyn therapy. off zosyn.     Plan:  - Ortho consulted, [facility] ortho team would like patient transferred to  under primary surgeon. If pt is unable to be safely transferred then ortho at [facility] will address her knee  - continue to be off antibiotics per ID  - waiting for records from [facility]   - Attempts to transfer patient to [facility] at the same ortho and plastic surgery dept who operated on her in order to continue care for knee wound

## 2024-06-29 NOTE — ASSESSMENT & PLAN NOTE
Ophthal consulted, continue to  follow recs which are comprised of petroleum topical application

## 2024-06-29 NOTE — SUBJECTIVE & OBJECTIVE
Interval History: improving rash, worsening bilateral UE swelling. Imaging ordered.    Review of Systems  Objective:     Vital Signs (Most Recent):  Temp: 97.9 °F (36.6 °C) (06/29/24 1150)  Pulse: 67 (06/29/24 1459)  Resp: 17 (06/29/24 1150)  BP: (!) 159/69 (06/29/24 1150)  SpO2: 100 % (06/29/24 1150) Vital Signs (24h Range):  Temp:  [97.5 °F (36.4 °C)-98 °F (36.7 °C)] 97.9 °F (36.6 °C)  Pulse:  [47-78] 67  Resp:  [16-18] 17  SpO2:  [95 %-100 %] 100 %  BP: (110-176)/(69-94) 159/69     Weight: 84.4 kg (186 lb)  Body mass index is 34.02 kg/m².    Intake/Output Summary (Last 24 hours) at 6/29/2024 1536  Last data filed at 6/29/2024 1250  Gross per 24 hour   Intake 1304.37 ml   Output 825 ml   Net 479.37 ml         Physical Exam  Cardiovascular:      Rate and Rhythm: Normal rate and regular rhythm.      Pulses: Normal pulses.      Heart sounds: Normal heart sounds.   Pulmonary:      Effort: Pulmonary effort is normal.      Breath sounds: Normal breath sounds.   Abdominal:      General: Bowel sounds are normal.      Palpations: Abdomen is soft.   Musculoskeletal:      Right lower leg: No edema.      Left lower leg: No edema.      Comments: Bilateral UE swelling    Skin:     Capillary Refill: Capillary refill takes less than 2 seconds.   Neurological:      Mental Status: She is alert. Mental status is at baseline. She is disoriented.      Motor: No weakness.             Significant Labs: All pertinent labs within the past 24 hours have been reviewed.    Significant Imaging: I have reviewed all pertinent imaging results/findings within the past 24 hours.

## 2024-06-29 NOTE — ASSESSMENT & PLAN NOTE
Concern for DRESS clinically, started on IV and topical steroids. DRESS confirmed on pathologyID recommends to continue holding antibiotics in the setting of DRESS.     - antihistamine, topical steroid  - IV methylpred, will reach out to derm regarding taper length  - derm consulted, agree with DRESS, to continue IV solemedrol, follow their recs.

## 2024-06-29 NOTE — ASSESSMENT & PLAN NOTE
HAGMA 2/2 to pre-renal JOSÉ    - was on HCO3 gtt, transitioned to LR; holding IVF in the setting of bilateral UE swelling and Cr returning to BL   - [facility] daily

## 2024-06-29 NOTE — ASSESSMENT & PLAN NOTE
Lab Results   Component Value Date    K 4.4 06/29/2024   . Will continue potassium replacement per protocol and recheck repeat levels after replacement completed.

## 2024-06-29 NOTE — PLAN OF CARE
CM noted open request thru the Transfer Center - attempting to transfer pt to [facility] to [facility] w/ primary surgeon, Dr. Hernandez. Transfer Center is awaiting decision from CNO w/ [facility].    ANGELES HernandezN, RN, [facility]  Transitional Care Manager  [phone]  [email or web address]    [facility] - Transplant Stepdown  Discharge Reassessment    Primary Care Provider: Frandy Roe MD    Expected Discharge Date: 7/1/2024    Reassessment (most recent)       Discharge Reassessment - 06/29/24 1403          Discharge Reassessment    Assessment Type Discharge Planning Reassessment     Discharge Plan A Hospital Transfer     Discharge Plan B Skilled Nursing Facility     DME Needed Upon Discharge  none     Transition of Care Barriers None     Why the patient remains in the hospital Requires continued medical care        Post-Acute Status    Post-Acute Authorization Placement     Post-Acute Placement Status Pending medical clearance/testing     Discharge Delays None known at this time

## 2024-06-29 NOTE — ASSESSMENT & PLAN NOTE
JOSÉ on CKD 2/2 to pre-renal JOSÉ s/p HCO3 gtt and IVF    Plan:  - Nephrology consulted, Cr returning to BL

## 2024-06-30 LAB
ALBUMIN SERPL BCP-MCNC: 1.9 G/DL (ref 3.5–5.2)
ANION GAP SERPL CALC-SCNC: 16 MMOL/L (ref 8–16)
ASCENDING AORTA: 3.27 CM
AV INDEX (PROSTH): 1.16
AV MEAN GRADIENT: 5 MMHG
AV PEAK GRADIENT: 8 MMHG
AV VALVE AREA BY VELOCITY RATIO: 2.67 CM²
AV VALVE AREA: 3.76 CM²
AV VELOCITY RATIO: 0.83
BASOPHILS # BLD AUTO: 0.02 K/UL (ref 0–0.2)
BASOPHILS NFR BLD: 0.1 % (ref 0–1.9)
BSA FOR ECHO PROCEDURE: 1.92 M2
BUN SERPL-MCNC: 58 MG/DL (ref 8–23)
CALCIUM SERPL-MCNC: 7.4 MG/DL (ref 8.7–10.5)
CHLORIDE SERPL-SCNC: 107 MMOL/L (ref 95–110)
CO2 SERPL-SCNC: 20 MMOL/L (ref 23–29)
CREAT SERPL-MCNC: 1.7 MG/DL (ref 0.5–1.4)
CV ECHO LV RWT: 0.46 CM
DIFFERENTIAL METHOD BLD: ABNORMAL
DOP CALC AO PEAK VEL: 1.43 M/S
DOP CALC AO VTI: 32.05 CM
DOP CALC LVOT AREA: 3.2 CM2
DOP CALC LVOT DIAMETER: 2.03 CM
DOP CALC LVOT PEAK VEL: 1.18 M/S
DOP CALC LVOT STROKE VOLUME: 120.53 CM3
DOP CALCLVOT PEAK VEL VTI: 37.26 CM
E WAVE DECELERATION TIME: 285.47 MSEC
E/A RATIO: 0.73
E/E' RATIO: 13.75 M/S
ECHO LV POSTERIOR WALL: 1.14 CM (ref 0.6–1.1)
EOSINOPHIL # BLD AUTO: 0 K/UL (ref 0–0.5)
EOSINOPHIL NFR BLD: 0 % (ref 0–8)
ERYTHROCYTE [DISTWIDTH] IN BLOOD BY AUTOMATED COUNT: 20.5 % (ref 11.5–14.5)
EST. GFR  (NO RACE VARIABLE): 28.8 ML/MIN/1.73 M^2
FRACTIONAL SHORTENING: 37 % (ref 28–44)
GLUCOSE SERPL-MCNC: 88 MG/DL (ref 70–110)
HCT VFR BLD AUTO: 22.9 % (ref 37–48.5)
HGB BLD-MCNC: 7.9 G/DL (ref 12–16)
IMM GRANULOCYTES # BLD AUTO: 0.33 K/UL (ref 0–0.04)
IMM GRANULOCYTES NFR BLD AUTO: 2.2 % (ref 0–0.5)
INTERVENTRICULAR SEPTUM: 1.13 CM (ref 0.6–1.1)
IVRT: 91.34 MSEC
LA MAJOR: 5.3 CM
LA MINOR: 5.84 CM
LA WIDTH: 3.45 CM
LEFT ATRIUM SIZE: 4.58 CM
LEFT ATRIUM VOLUME INDEX MOD: 21.6 ML/M2
LEFT ATRIUM VOLUME INDEX: 40.3 ML/M2
LEFT ATRIUM VOLUME MOD: 40 CM3
LEFT ATRIUM VOLUME: 74.63 CM3
LEFT INTERNAL DIMENSION IN SYSTOLE: 3.09 CM (ref 2.1–4)
LEFT VENTRICLE DIASTOLIC VOLUME INDEX: 61.25 ML/M2
LEFT VENTRICLE DIASTOLIC VOLUME: 113.32 ML
LEFT VENTRICLE MASS INDEX: 114 G/M2
LEFT VENTRICLE SYSTOLIC VOLUME INDEX: 20.3 ML/M2
LEFT VENTRICLE SYSTOLIC VOLUME: 37.59 ML
LEFT VENTRICULAR INTERNAL DIMENSION IN DIASTOLE: 4.91 CM (ref 3.5–6)
LEFT VENTRICULAR MASS: 210.08 G
LV LATERAL E/E' RATIO: 11 M/S
LV SEPTAL E/E' RATIO: 18.33 M/S
LYMPHOCYTES # BLD AUTO: 2.4 K/UL (ref 1–4.8)
LYMPHOCYTES NFR BLD: 16.3 % (ref 18–48)
MAGNESIUM SERPL-MCNC: 1.5 MG/DL (ref 1.6–2.6)
MCH RBC QN AUTO: 32.5 PG (ref 27–31)
MCHC RBC AUTO-ENTMCNC: 34.5 G/DL (ref 32–36)
MCV RBC AUTO: 94 FL (ref 82–98)
MONOCYTES # BLD AUTO: 0.4 K/UL (ref 0.3–1)
MONOCYTES NFR BLD: 2.5 % (ref 4–15)
MV PEAK A VEL: 1.51 M/S
MV PEAK E VEL: 1.1 M/S
MV STENOSIS PRESSURE HALF TIME: 82.79 MS
MV VALVE AREA P 1/2 METHOD: 2.66 CM2
NEUTROPHILS # BLD AUTO: 11.7 K/UL (ref 1.8–7.7)
NEUTROPHILS NFR BLD: 78.9 % (ref 38–73)
NRBC BLD-RTO: 0 /100 WBC
PHOSPHATE SERPL-MCNC: 4.3 MG/DL (ref 2.7–4.5)
PHOSPHATE SERPL-MCNC: 4.3 MG/DL (ref 2.7–4.5)
PISA TR MAX VEL: 2.4 M/S
PLATELET # BLD AUTO: 157 K/UL (ref 150–450)
PMV BLD AUTO: 12.4 FL (ref 9.2–12.9)
POCT GLUCOSE: 84 MG/DL (ref 70–110)
POCT GLUCOSE: 95 MG/DL (ref 70–110)
POCT GLUCOSE: 95 MG/DL (ref 70–110)
POTASSIUM SERPL-SCNC: 4.4 MMOL/L (ref 3.5–5.1)
RA MAJOR: 3.95 CM
RA PRESSURE ESTIMATED: 3 MMHG
RA WIDTH: 2.87 CM
RBC # BLD AUTO: 2.43 M/UL (ref 4–5.4)
RIGHT VENTRICLE DIASTOLIC BASEL DIMENSION: 3.3 CM
RV TB RVSP: 5 MMHG
SINUS: 2.97 CM
SODIUM SERPL-SCNC: 143 MMOL/L (ref 136–145)
STJ: 2.51 CM
TDI LATERAL: 0.1 M/S
TDI SEPTAL: 0.06 M/S
TDI: 0.08 M/S
TR MAX PG: 23 MMHG
TRICUSPID ANNULAR PLANE SYSTOLIC EXCURSION: 2.09 CM
TV REST PULMONARY ARTERY PRESSURE: 26 MMHG
WBC # BLD AUTO: 14.83 K/UL (ref 3.9–12.7)
Z-SCORE OF LEFT VENTRICULAR DIMENSION IN END DIASTOLE: -0.46
Z-SCORE OF LEFT VENTRICULAR DIMENSION IN END SYSTOLE: -0.21

## 2024-06-30 PROCEDURE — 63600175 PHARM REV CODE 636 W HCPCS

## 2024-06-30 PROCEDURE — 83735 ASSAY OF MAGNESIUM: CPT

## 2024-06-30 PROCEDURE — 80069 RENAL FUNCTION PANEL: CPT

## 2024-06-30 PROCEDURE — 20600001 HC STEP DOWN PRIVATE ROOM

## 2024-06-30 PROCEDURE — 25000003 PHARM REV CODE 250

## 2024-06-30 PROCEDURE — 36415 COLL VENOUS BLD VENIPUNCTURE: CPT

## 2024-06-30 PROCEDURE — A4216 STERILE WATER/SALINE, 10 ML: HCPCS | Performed by: INTERNAL MEDICINE

## 2024-06-30 PROCEDURE — 85025 COMPLETE CBC W/AUTO DIFF WBC: CPT

## 2024-06-30 PROCEDURE — 25000003 PHARM REV CODE 250: Performed by: INTERNAL MEDICINE

## 2024-06-30 RX ORDER — MAGNESIUM SULFATE HEPTAHYDRATE 40 MG/ML
2 INJECTION, SOLUTION INTRAVENOUS ONCE
Status: COMPLETED | OUTPATIENT
Start: 2024-06-30 | End: 2024-06-30

## 2024-06-30 RX ADMIN — Medication 10 ML: at 05:06

## 2024-06-30 RX ADMIN — VALACYCLOVIR HYDROCHLORIDE 1000 MG: 500 TABLET, FILM COATED ORAL at 11:06

## 2024-06-30 RX ADMIN — HEPARIN SODIUM 5000 UNITS: 5000 INJECTION INTRAVENOUS; SUBCUTANEOUS at 08:06

## 2024-06-30 RX ADMIN — CALCIUM CARBONATE (ANTACID) CHEW TAB 500 MG 500 MG: 500 CHEW TAB at 11:06

## 2024-06-30 RX ADMIN — MAGNESIUM SULFATE HEPTAHYDRATE 2 G: 40 INJECTION, SOLUTION INTRAVENOUS at 11:06

## 2024-06-30 RX ADMIN — WHITE PETROLATUM: 1.75 OINTMENT TOPICAL at 08:06

## 2024-06-30 RX ADMIN — TRIAMCINOLONE ACETONIDE: 1 CREAM TOPICAL at 08:06

## 2024-06-30 RX ADMIN — HEPARIN SODIUM 5000 UNITS: 5000 INJECTION INTRAVENOUS; SUBCUTANEOUS at 05:06

## 2024-06-30 RX ADMIN — MUPIROCIN: 20 OINTMENT TOPICAL at 11:06

## 2024-06-30 RX ADMIN — TRIAMCINOLONE ACETONIDE: 1 CREAM TOPICAL at 11:06

## 2024-06-30 RX ADMIN — HYDROXYZINE HYDROCHLORIDE 25 MG: 25 TABLET ORAL at 08:06

## 2024-06-30 RX ADMIN — QUETIAPINE FUMARATE 25 MG: 25 TABLET ORAL at 08:06

## 2024-06-30 RX ADMIN — HEPARIN SODIUM 5000 UNITS: 5000 INJECTION INTRAVENOUS; SUBCUTANEOUS at 03:06

## 2024-06-30 RX ADMIN — CETIRIZINE HYDROCHLORIDE 5 MG: 5 TABLET, FILM COATED ORAL at 11:06

## 2024-06-30 RX ADMIN — WHITE PETROLATUM: 1.75 OINTMENT TOPICAL at 11:06

## 2024-06-30 RX ADMIN — MINERAL OIL AND WHITE PETROLATUM: 30; 940 OINTMENT OPHTHALMIC at 08:06

## 2024-06-30 RX ADMIN — MINERAL OIL AND WHITE PETROLATUM: 30; 940 OINTMENT OPHTHALMIC at 11:06

## 2024-06-30 RX ADMIN — MUPIROCIN: 20 OINTMENT TOPICAL at 08:06

## 2024-06-30 RX ADMIN — METHYLPREDNISOLONE SODIUM SUCCINATE 80 MG: 125 INJECTION, POWDER, FOR SOLUTION INTRAMUSCULAR; INTRAVENOUS at 11:06

## 2024-06-30 RX ADMIN — ATORVASTATIN CALCIUM 10 MG: 10 TABLET, FILM COATED ORAL at 11:06

## 2024-06-30 NOTE — ASSESSMENT & PLAN NOTE
.Pt had a recent TKA, fall after OR and femur fracture, requiring ORIF followed by wound infection and complex healing course. Bone cx alpha hemolytic strep. Required ABX therapy guided by ID c/o Linezolid followed by recent initiation of Zosyn therapy. off zosyn.   CT lower limb: infections vs inflammatory process in the absence of evidence of osteomyelitis    Plan:  - Ortho consulted, [facility] ortho team would like patient transferred to  under primary surgeon. If pt is unable to be safely transferred then ortho at [facility] will address her knee  - rediscuss with ID treatment options  - waiting for records from [facility]   - Attempts to transfer patient to [facility] at the same ortho and plastic surgery dept who operated on her in order to continue care for knee wound

## 2024-06-30 NOTE — PLAN OF CARE
Pt oriented to self & place, disoriented to time & situation. A-fib/bibi on tele monitoring. Pt w/o complaints of pain. Pt on diabetic/pureed diet, 0 BM/shift. Pt w/ poor appetite, Boost supplements encouraged. Blood glucose monitoring ACHS. Yuen intact w/ 400 cc/shift clear yellow UOP. Wound care provided per nursing orders. Mg on AM labs 1.5, IV replacements administered. CT R knee, BUE US, & echo completed. Weight shifting provided Q2 hr w/ pillow support. Bed in lowest position, wheels locked, call light within pt reach. Bed alarm set. Pt's family @ bedside, attentive to pt & participating in care.

## 2024-06-30 NOTE — ASSESSMENT & PLAN NOTE
HAGMA 2/2 to pre-renal JOSÉ    - was on HCO3 gtt, transitioned to LR; holding IVF in the setting of bilateral UE swelling and Cr returning to BL   - [facility] daily

## 2024-06-30 NOTE — PROGRESS NOTES
[facility] - Transplant [facility] Medicine  Progress Note    Patient Name: Dahlia Spence  MRN: [id number]  Patient Class: IP- Inpatient   Admission Date: 6/21/2024  Length of Stay: 8 days  Attending Physician: Akil Gandhi III*  Primary Care Provider: Frandy Roe MD        Subjective:     Principal Problem:DRESS syndrome        HPI:  88 YOF w/CKD3, T2DM, COPD, PVD 2/2 to DM, HTN, HLD, LBP, OA who presented for a skin rash. The patient had a recent I&D (05/16/2024) for her R knee wound and started zosyn. Then 7 days ago she developed full body hives with rash and tongue swelling. Family reports AMS and is not at her baseline.     Recent history: right distal femur replacement on 04/29/2024, then I&D of RLE 5/16/24, then dehiscence of the right TKA and underwent a right gastroc flap, split thickness skin graft, and wound VAC on 05/23/2024. Then the patient had a post op infection and had a 10 day course of linezolid to daptomycin. Then she had a second post op infection treated with a 6 week course of zosyn on  05/30/2024 and was seen by ID.     In the ED: patient received fluid resuscitation and IV ceftriaxone. She exhibited signs of minimal skin desqaumation amidst normal vital signs.  WBC 20.56  HGB 12.6     K 3.0  CRT 5.0 (baseline 1.5)    Overview/Hospital Course:  88 YOF w/CKD3, T2DM, COPD, PVD 2/2 to DM, HTN, HLD, LBP, OA who presented for a severe total body rash, AMS, and profound JOSÉ and is admitted for DRESS syndrome 2/2 to zosyn. The pt had a recent TKA, fall after OR and femur fracture, requiring ORIF followed by wound infection and complex healing course at  where she required ABX therapy guided by ID and started zosyn. Wound care, ID, ortho, derm, and nephro were consulted. For DRESS (confirmed on path) she is on IV and topical steroids, and it's improving. Per ortho the patient needs to follow up with his orthopedic surgeon at  and will only intervene if the patient  is unstable. ID recommends continuing to hold antibiotics in the setting of DRESS. Developed an JOSÉ this admission, which is likely 2/2 to prerenal, started on HCO3 gtt and transitioned to continuous LR, and now off IVF given improvement in JOSÉ and new upper extremity swelling. Started nightly seroquel for AMS 2/2 possibly to delirium from acute illness (neg CTH) which has been unchanged throughout admission. We obtained more knee imaging to better characterize prosthetic joint infection while EJ transfer situation is pending.  Worsening UE swelling bilaterally, obtained bilateral upper extremity US. CT lower limb, inflammatory/infectious process in the absence of osteomyelitis. Discussing with ID treatment options.    Interval History: patient more alert and more comfortable.     Review of Systems  Objective:     Vital Signs (Most Recent):  Temp: 97.6 °F (36.4 °C) (06/30/24 1117)  Pulse: 61 (06/30/24 1121)  Resp: 14 (06/30/24 1117)  BP: 135/84 (06/30/24 1117)  SpO2: 97 % (06/30/24 1117) Vital Signs (24h Range):  Temp:  [97.5 °F (36.4 °C)-99.1 °F (37.3 °C)] 97.6 °F (36.4 °C)  Pulse:  [60-72] 61  Resp:  [14-20] 14  SpO2:  [96 %-99 %] 97 %  BP: (134-164)/(63-84) 135/84     Weight: 84.4 kg (186 lb)  Body mass index is 34.02 kg/m².    Intake/Output Summary (Last 24 hours) at 6/30/2024 1404  Last data filed at 6/30/2024 1300  Gross per 24 hour   Intake 780 ml   Output 625 ml   Net 155 ml         Physical Exam  HENT:      Head: Normocephalic.   Eyes:      Pupils: Pupils are equal, round, and reactive to light.   Cardiovascular:      Rate and Rhythm: Normal rate and regular rhythm.      Pulses: Normal pulses.      Heart sounds: Normal heart sounds.   Pulmonary:      Effort: Pulmonary effort is normal. No respiratory distress.      Breath sounds: Normal breath sounds.   Abdominal:      General: Bowel sounds are normal.      Palpations: Abdomen is soft.   Musculoskeletal:      Right lower leg: No edema.      Left lower leg:  No edema.   Skin:     Capillary Refill: Capillary refill takes less than 2 seconds.      Findings: Erythema and rash present.      Comments: Rash improving from admission baseline   Neurological:      Mental Status: She is alert. She is disoriented.      Comments: Orientation improving from admission baseline             Significant Labs: All pertinent labs within the past 24 hours have been reviewed.    Significant Imaging: I have reviewed all pertinent imaging results/findings within the past 24 hours.    Assessment/Plan:      * DRESS syndrome  Concern for DRESS clinically, started on IV and topical steroids. DRESS confirmed on pathologyID recommends to continue holding antibiotics in the setting of DRESS.     - Antihistamine, topical steroid  - IV methylpred, will reach out to derm regarding taper length  - Derm consulted, agree with DRESS, to continue IV solemedrol, follow their recs.      Eye abnormalities  Ophthal consulted, continue to  follow recs which are comprised of petroleum topical application      Weakness  PT/OT following     Acidosis  HAGMA 2/2 to pre-renal JOSÉ    - was on HCO3 gtt, transitioned to LR; holding IVF in the setting of bilateral UE swelling and Cr returning to BL   - [facility] daily      Hypokalemia    Lab Results   Component Value Date    K 4.4 06/29/2024   . Will continue potassium replacement per protocol and recheck repeat levels after replacement completed.     Metabolic acidosis, increased anion gap  See acidosis      JOSÉ (acute kidney injury)  Patient with acute kidney injury/acute renal failure likely due to pre-renal azotemia due to dehydration JOSÉ is currently improving. Baseline creatinine  1.5  - Labs reviewed- Renal function/electrolytes with Estimated Creatinine Clearance: 23.5 mL/min (A) (based on SCr of 1.7 mg/dL (H)). according to latest data. Monitor urine output and serial BMP and adjust therapy as needed. Avoid nephrotoxins and renally dose meds for GFR listed  above.    Open wound of right knee s/p R distal femoral replacement, I&D, and gastrocnemius flap  .Pt had a recent TKA, fall after OR and femur fracture, requiring ORIF followed by wound infection and complex healing course. Bone cx alpha hemolytic strep. Required ABX therapy guided by ID c/o Linezolid followed by recent initiation of Zosyn therapy. off zosyn.   CT lower limb: infections vs inflammatory process in the absence of evidence of osteomyelitis    Plan:  - Ortho consulted, [facility] ortho team would like patient transferred to  under primary surgeon. If pt is unable to be safely transferred then ortho at [facility] will address her knee  - rediscuss with ID treatment options  - waiting for records from ltac   - Attempts to transfer patient to [facility] at the same ortho and plastic surgery dept who operated on her in order to continue care for knee wound       Type 2 diabetes mellitus with other circulatory complications  Low dose ssi as needed prn  HBA1C      Chronic obstructive pulmonary disease, unspecified  Supplemental oxygen  Duo-nebs  Target SPO2 >88%    Chronic kidney disease, stage III (moderate)  JOSÉ on CKD 2/2 to pre-renal JOSÉ s/p HCO3 gtt and IVF    Plan:  - Nephrology consulted, Cr returning to      Benign essential hypertension  - hold home BP meds in setting of JOSÉ, restart when able       VTE Risk Mitigation (From admission, onward)           Ordered     heparin (porcine) injection 5,000 Units  Every 8 hours         06/21/24 1803     IP VTE HIGH RISK PATIENT  Once         06/21/24 1803     Place sequential compression device  Until discontinued         06/21/24 1803                    Discharge Planning   ACE: 7/2/2024     Code Status: DNR   Is the patient medically ready for discharge?:     Reason for patient still in hospital (select all that apply): Treatment  Discharge Plan A: Hospital Transfer   Discharge Delays: None known at this time              Oliva Harp MD  Department of Hospital  Medicine   [facility] - Transplant Stepdown

## 2024-06-30 NOTE — PLAN OF CARE
Subjective:  Patient seen and examined this afternoon. Family not at bedside. Overall, pt is much more interactive and able to open eyes.  Rash and mucositis also continues to improving. WBC continues to mildly trending back up. Cr continues to recover      Exam:  Notable improvement. Mucositis continues to be improving. Exfoliation still present as expected. Underlying erythema much improved.           Assessment:  Currently admitted for DRESS. Cutaneous as well as systemic findings overall improving at this time. 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      Recommendations:  Continue solumedrol 80 mg IV daily.   Continue valtrex 1 g BID until completed a 5 day course  Continue Aquaphor BID to the mouth, gentle moisturizers at the bed side for skin exfoliation, and lubricating ophthalmic ointment BID for eyes as recommended by ophthalmology  CMV PCR slightly elevated and IgG positive, but IgM negative. CMV reactivation this time is unlikely   Follow-up [facility] serology  Continue trending WBC given her increasing leukocytosis, despite no increase in steroids. May be secondary to infection. ID currently holding antibiotics. Will defer workup and management to ID

## 2024-06-30 NOTE — SUBJECTIVE & OBJECTIVE
Interval History: patient more alert and more comfortable.     Review of Systems  Objective:     Vital Signs (Most Recent):  Temp: 97.6 °F (36.4 °C) (06/30/24 1117)  Pulse: 61 (06/30/24 1121)  Resp: 14 (06/30/24 1117)  BP: 135/84 (06/30/24 1117)  SpO2: 97 % (06/30/24 1117) Vital Signs (24h Range):  Temp:  [97.5 °F (36.4 °C)-99.1 °F (37.3 °C)] 97.6 °F (36.4 °C)  Pulse:  [60-72] 61  Resp:  [14-20] 14  SpO2:  [96 %-99 %] 97 %  BP: (134-164)/(63-84) 135/84     Weight: 84.4 kg (186 lb)  Body mass index is 34.02 kg/m².    Intake/Output Summary (Last 24 hours) at 6/30/2024 1404  Last data filed at 6/30/2024 1300  Gross per 24 hour   Intake 780 ml   Output 625 ml   Net 155 ml         Physical Exam  HENT:      Head: Normocephalic.   Eyes:      Pupils: Pupils are equal, round, and reactive to light.   Cardiovascular:      Rate and Rhythm: Normal rate and regular rhythm.      Pulses: Normal pulses.      Heart sounds: Normal heart sounds.   Pulmonary:      Effort: Pulmonary effort is normal. No respiratory distress.      Breath sounds: Normal breath sounds.   Abdominal:      General: Bowel sounds are normal.      Palpations: Abdomen is soft.   Musculoskeletal:      Right lower leg: No edema.      Left lower leg: No edema.   Skin:     Capillary Refill: Capillary refill takes less than 2 seconds.      Findings: Erythema and rash present.      Comments: Rash improving from admission baseline   Neurological:      Mental Status: She is alert. She is disoriented.      Comments: Orientation improving from admission baseline             Significant Labs: All pertinent labs within the past 24 hours have been reviewed.    Significant Imaging: I have reviewed all pertinent imaging results/findings within the past 24 hours.

## 2024-07-01 LAB
ANION GAP SERPL CALC-SCNC: 14 MMOL/L (ref 8–16)
BASOPHILS # BLD AUTO: 0.03 K/UL (ref 0–0.2)
BASOPHILS NFR BLD: 0.2 % (ref 0–1.9)
BUN SERPL-MCNC: 53 MG/DL (ref 8–23)
CALCIUM SERPL-MCNC: 7.2 MG/DL (ref 8.7–10.5)
CHLORIDE SERPL-SCNC: 108 MMOL/L (ref 95–110)
CO2 SERPL-SCNC: 20 MMOL/L (ref 23–29)
CREAT SERPL-MCNC: 1.5 MG/DL (ref 0.5–1.4)
DIFFERENTIAL METHOD BLD: ABNORMAL
EOSINOPHIL # BLD AUTO: 0.2 K/UL (ref 0–0.5)
EOSINOPHIL NFR BLD: 1.1 % (ref 0–8)
ERYTHROCYTE [DISTWIDTH] IN BLOOD BY AUTOMATED COUNT: 21.3 % (ref 11.5–14.5)
EST. GFR  (NO RACE VARIABLE): 33.5 ML/MIN/1.73 M^2
GLUCOSE SERPL-MCNC: 66 MG/DL (ref 70–110)
HCT VFR BLD AUTO: 23.5 % (ref 37–48.5)
HGB BLD-MCNC: 7.8 G/DL (ref 12–16)
IMM GRANULOCYTES # BLD AUTO: 0.28 K/UL (ref 0–0.04)
IMM GRANULOCYTES NFR BLD AUTO: 1.5 % (ref 0–0.5)
LYMPHOCYTES # BLD AUTO: 2 K/UL (ref 1–4.8)
LYMPHOCYTES NFR BLD: 11 % (ref 18–48)
MAGNESIUM SERPL-MCNC: 1.7 MG/DL (ref 1.6–2.6)
MCH RBC QN AUTO: 32.2 PG (ref 27–31)
MCHC RBC AUTO-ENTMCNC: 33.2 G/DL (ref 32–36)
MCV RBC AUTO: 97 FL (ref 82–98)
MONOCYTES # BLD AUTO: 0.5 K/UL (ref 0.3–1)
MONOCYTES NFR BLD: 3 % (ref 4–15)
NEUTROPHILS # BLD AUTO: 15.1 K/UL (ref 1.8–7.7)
NEUTROPHILS NFR BLD: 83.2 % (ref 38–73)
NRBC BLD-RTO: 0 /100 WBC
PHOSPHATE SERPL-MCNC: 3.8 MG/DL (ref 2.7–4.5)
PLATELET # BLD AUTO: 161 K/UL (ref 150–450)
PMV BLD AUTO: 12.2 FL (ref 9.2–12.9)
POCT GLUCOSE: 159 MG/DL (ref 70–110)
POCT GLUCOSE: 80 MG/DL (ref 70–110)
POCT GLUCOSE: 93 MG/DL (ref 70–110)
POTASSIUM SERPL-SCNC: 4.2 MMOL/L (ref 3.5–5.1)
RBC # BLD AUTO: 2.42 M/UL (ref 4–5.4)
SODIUM SERPL-SCNC: 142 MMOL/L (ref 136–145)
WBC # BLD AUTO: 18.2 K/UL (ref 3.9–12.7)

## 2024-07-01 PROCEDURE — 83735 ASSAY OF MAGNESIUM: CPT

## 2024-07-01 PROCEDURE — 25000003 PHARM REV CODE 250

## 2024-07-01 PROCEDURE — 63600175 PHARM REV CODE 636 W HCPCS

## 2024-07-01 PROCEDURE — 97530 THERAPEUTIC ACTIVITIES: CPT | Mod: CQ

## 2024-07-01 PROCEDURE — 36415 COLL VENOUS BLD VENIPUNCTURE: CPT | Performed by: FAMILY MEDICINE

## 2024-07-01 PROCEDURE — 25000003 PHARM REV CODE 250: Performed by: INTERNAL MEDICINE

## 2024-07-01 PROCEDURE — 85025 COMPLETE CBC W/AUTO DIFF WBC: CPT

## 2024-07-01 PROCEDURE — 97112 NEUROMUSCULAR REEDUCATION: CPT | Mod: CQ

## 2024-07-01 PROCEDURE — 25000003 PHARM REV CODE 250: Performed by: PHYSICIAN ASSISTANT

## 2024-07-01 PROCEDURE — 97110 THERAPEUTIC EXERCISES: CPT | Mod: CO

## 2024-07-01 PROCEDURE — 97530 THERAPEUTIC ACTIVITIES: CPT | Mod: CO

## 2024-07-01 PROCEDURE — A4216 STERILE WATER/SALINE, 10 ML: HCPCS | Performed by: INTERNAL MEDICINE

## 2024-07-01 PROCEDURE — 20600001 HC STEP DOWN PRIVATE ROOM

## 2024-07-01 PROCEDURE — 63600175 PHARM REV CODE 636 W HCPCS: Performed by: PHYSICIAN ASSISTANT

## 2024-07-01 PROCEDURE — 92610 EVALUATE SWALLOWING FUNCTION: CPT

## 2024-07-01 PROCEDURE — 99233 SBSQ HOSP IP/OBS HIGH 50: CPT | Mod: ,,, | Performed by: PHYSICIAN ASSISTANT

## 2024-07-01 PROCEDURE — 84100 ASSAY OF PHOSPHORUS: CPT

## 2024-07-01 PROCEDURE — 27000207 HC ISOLATION

## 2024-07-01 PROCEDURE — 36415 COLL VENOUS BLD VENIPUNCTURE: CPT

## 2024-07-01 PROCEDURE — 80048 BASIC METABOLIC PNL TOTAL CA: CPT | Performed by: FAMILY MEDICINE

## 2024-07-01 RX ORDER — ERGOCALCIFEROL 1.25 MG/1
50000 CAPSULE ORAL
Status: DISCONTINUED | OUTPATIENT
Start: 2024-07-03 | End: 2024-07-04 | Stop reason: HOSPADM

## 2024-07-01 RX ORDER — PREDNISONE 20 MG/1
80 TABLET ORAL DAILY
Status: DISCONTINUED | OUTPATIENT
Start: 2024-07-02 | End: 2024-07-02

## 2024-07-01 RX ORDER — NIFEDIPINE 30 MG/1
30 TABLET, EXTENDED RELEASE ORAL DAILY
Status: DISCONTINUED | OUTPATIENT
Start: 2024-07-01 | End: 2024-07-01

## 2024-07-01 RX ORDER — NIFEDIPINE 30 MG/1
30 TABLET, EXTENDED RELEASE ORAL DAILY
Status: DISCONTINUED | OUTPATIENT
Start: 2024-07-02 | End: 2024-07-04 | Stop reason: HOSPADM

## 2024-07-01 RX ADMIN — CALCIUM CARBONATE (ANTACID) CHEW TAB 500 MG 500 MG: 500 CHEW TAB at 11:07

## 2024-07-01 RX ADMIN — WHITE PETROLATUM: 1.75 OINTMENT TOPICAL at 12:07

## 2024-07-01 RX ADMIN — Medication 10 ML: at 05:07

## 2024-07-01 RX ADMIN — MINERAL OIL AND WHITE PETROLATUM: 30; 940 OINTMENT OPHTHALMIC at 12:07

## 2024-07-01 RX ADMIN — HYDROXYZINE HYDROCHLORIDE 25 MG: 25 TABLET ORAL at 09:07

## 2024-07-01 RX ADMIN — HEPARIN SODIUM 5000 UNITS: 5000 INJECTION INTRAVENOUS; SUBCUTANEOUS at 03:07

## 2024-07-01 RX ADMIN — HEPARIN SODIUM 5000 UNITS: 5000 INJECTION INTRAVENOUS; SUBCUTANEOUS at 09:07

## 2024-07-01 RX ADMIN — AMLODIPINE BESYLATE 5 MG: 5 TABLET ORAL at 11:07

## 2024-07-01 RX ADMIN — MUPIROCIN: 20 OINTMENT TOPICAL at 11:07

## 2024-07-01 RX ADMIN — CETIRIZINE HYDROCHLORIDE 5 MG: 5 TABLET, FILM COATED ORAL at 11:07

## 2024-07-01 RX ADMIN — METHYLPREDNISOLONE SODIUM SUCCINATE 80 MG: 125 INJECTION, POWDER, FOR SOLUTION INTRAMUSCULAR; INTRAVENOUS at 11:07

## 2024-07-01 RX ADMIN — DAPTOMYCIN 670 MG: 500 INJECTION, POWDER, LYOPHILIZED, FOR SOLUTION INTRAVENOUS at 05:07

## 2024-07-01 RX ADMIN — HEPARIN SODIUM 5000 UNITS: 5000 INJECTION INTRAVENOUS; SUBCUTANEOUS at 05:07

## 2024-07-01 RX ADMIN — QUETIAPINE FUMARATE 25 MG: 25 TABLET ORAL at 09:07

## 2024-07-01 RX ADMIN — Medication 10 ML: at 12:07

## 2024-07-01 RX ADMIN — ATORVASTATIN CALCIUM 10 MG: 10 TABLET, FILM COATED ORAL at 11:07

## 2024-07-01 RX ADMIN — TRIAMCINOLONE ACETONIDE: 1 CREAM TOPICAL at 12:07

## 2024-07-01 RX ADMIN — TRIAMCINOLONE ACETONIDE: 1 CREAM TOPICAL at 09:07

## 2024-07-01 RX ADMIN — MUPIROCIN: 20 OINTMENT TOPICAL at 09:07

## 2024-07-01 RX ADMIN — WHITE PETROLATUM: 1.75 OINTMENT TOPICAL at 09:07

## 2024-07-01 NOTE — PLAN OF CARE
[facility] - Transplant Stepdown  Discharge Reassessment    Primary Care Provider: Frandy Roe MD    Expected Discharge Date: 7/2/2024    Reassessment (most recent)       Discharge Reassessment - 07/01/24 1145          Discharge Reassessment    Assessment Type Discharge Planning Reassessment     Communicated ACE with patient/caregiver Date not available/Unable to determine     Discharge Plan A Long-term acute care facility (LTAC)     Discharge Plan B Home;Home with family;Home Health     DME Needed Upon Discharge  none     Transition of Care Barriers Underinsured     Why the patient remains in the hospital Requires continued medical care                     Per MD's Note,  Interval History: patient more alert and more comfortable.     Discharge Plan A and Plan B have been determined by review of patient's clinical status, future medical and therapeutic needs, and coverage/benefits for post-acute care in coordination with multidisciplinary team members.        Tati Braden LCSW  Case Management [facility]'

## 2024-07-01 NOTE — ASSESSMENT & PLAN NOTE
Patient with acute kidney injury/acute renal failure likely due to pre-renal azotemia due to dehydration JOSÉ is currently improving. Baseline creatinine  1.5  - Labs reviewed- Renal function/electrolytes with Estimated Creatinine Clearance: 26.5 mL/min (A) (based on SCr of 1.5 mg/dL (H)). according to latest data. Monitor urine output and serial BMP and adjust therapy as needed. Avoid nephrotoxins and renally dose meds for GFR listed above.

## 2024-07-01 NOTE — ASSESSMENT & PLAN NOTE
Low dose ssi as needed prn  HBA1C     Additional Notes: Deep shave (sent for pathology) and then ED&C x 3 done on lesion today. Pt advised to notify office if any issues with wound healing. We will schedule recheck of site and skin check in 3 months Render Risk Assessment In Note?: no Detail Level: Simple

## 2024-07-01 NOTE — ASSESSMENT & PLAN NOTE
Lab Results   Component Value Date    K 4.2 07/01/2024   . Will continue potassium replacement per protocol and recheck repeat levels after replacement completed.

## 2024-07-01 NOTE — PT/OT/SLP PROGRESS
Physical Therapy Co-Treatment  Co-treatment performed due to patient's multiple deficits requiring two skilled therapists to appropriately and safely assess patient's strength and endurance while facilitating functional tasks in addition to accommodating for patient's activity tolerance.      Patient Name:  Dahlia Spence   MRN:  [id number]    Recommendations:     Discharge Recommendations: Moderate Intensity Therapy  Discharge Equipment Recommendations: hospital bed, lift device, wheelchair  Barriers to discharge: Inaccessible home, Decreased caregiver support, and current level of assistance required    Assessment:     Dahlia Spence is a 88 y.o. female admitted with a medical diagnosis of DRESS syndrome.  She presents with the following impairments/functional limitations: weakness, impaired endurance, impaired self care skills, impaired functional mobility, gait instability, impaired balance, impaired cognition, decreased lower extremity function, decreased safety awareness, impaired cardiopulmonary response to activity, impaired skin.    Pt tolerated treatment fairly well. Vitals assessed prior to mobilization for safety. Pt continues to require Max A x 2 for most interventions at this time. Attempts x 2 made to stand pt with COLLINS present. Pt unable to adhere to current WB precautions at this time with both TTWB and NWB assessed per tactile cueing by PTA. Pt responded well to EOB static sitting and requested to be kept upright for prolonged time period to improve overall tolerance (beyond time spent in room for treatment). Pt will continue to benefit from skilled PT services in order to further promote functional mobility, endurance, and BLE strengthening. Pt remains appropriate for PT treatment at this time.    Rehab Prognosis: Fair; patient would benefit from acute skilled PT services to address these deficits and reach maximum level of function.    Recent Surgery: * No surgery found *      Plan:     During  this hospitalization, patient to be seen 3 x/week to address the identified rehab impairments via gait training, neuromuscular re-education, therapeutic exercises, therapeutic activities and progress toward the following goals:    Plan of Care Expires:  07/26/24    Subjective     Chief Complaint: none stated  Patient/Family Comments/goals: to get stronger  Pain/Comfort:  Pain Rating 1: 0/10  Pain Rating Post-Intervention 1: 0/10      Objective:     Communicated with nursing prior to session.  Patient found HOB elevated with telemetry, knee immobilizer, blood pressure cuff, rueda catheter, peripheral IV upon PT entry to room.     General Precautions: Standard, fall  Orthopedic Precautions: RLE non weight bearing  Braces: Knee immobilizer (RLE)  Respiratory Status: Room air     Functional Mobility:  Bed Mobility:     Rolling Right: moderate assistance (bed elevated, pt using bed rail)  Scooting: maximal assistance and of 2 persons  Supine to Sit: moderate assistance and of 2 persons  Transfers:     Sit to Stand:  maximal assistance and of 2 persons with no AD; total assistance and of 2 persons with no AD. Pt with increased vc/tc for hand placement, pushing thru BUE and LLE, general participation. Pt unable to complete at this time.  Balance: EOB static sitting with CGA/SBA ~12 minutes for seated therex, trunk mobility (flexion)    Vitals assessed with HOB elevated, pt at rest: /84,  mmHg, SpO2 96%  Pt cleared for mobilization per nursing.    AM-PAC 6 CLICK MOBILITY  Turning over in bed (including adjusting bedclothes, sheets and blankets)?: 2  Sitting down on and standing up from a chair with arms (e.g., wheelchair, bedside commode, etc.): 1  Moving from lying on back to sitting on the side of the bed?: 2  Moving to and from a bed to a chair (including a wheelchair)?: 1  Need to walk in hospital room?: 1  Climbing 3-5 steps with a railing?: 1  Basic Mobility Total Score: 8   2    Treatment &  Education:  Pt able to perform seated exercises consisting of ankle DF/PF,  left knee flexion/extension, B hip flexion, and B hip abd/adduction. (RLE AAROM required for hip flexion, hip abd/adduction). Trunk stabilized to prevent posterior lean with CGA. All exercises incorporated to promote BLE strengthening, tissue extensibility, endurance and functional mobility.    Multidirectional BUE AAROM performed with COLLINS at EOB.    Patient educated on role of therapy, goals of session, and benefits of out of bed mobility.  Patient/daughter educated on adjusting knee immobilizer while pt in bed (long leg sitting).  Instructed on use of call button and importance of calling nursing staff for assistance with all functional mobility.  Questions/concerns addressed within PTA scope of practice  Pt verbalized understanding.    Patient left sitting edge of bed with all lines intact, call button in reach, nursing notified, and daughter present..    GOALS:   Multidisciplinary Problems       Physical Therapy Goals          Problem: Physical Therapy    Goal Priority Disciplines Outcome Goal Variances Interventions   Physical Therapy Goal     PT, PT/OT Progressing     Description: Goals to be met by:      Patient will increase functional independence with mobility by performin. Supine to sit with Maximum Assistance  2. Sit to supine with Maximum Assistance  3. Rolling to Left and Right with Maximum Assistance.  4. Bed to chair transfer with Maximum Assistance using LRAD  5. Lateral scooting in sitting with maximum assistance.   6. Sitting at edge of bed x8 minutes with Contact Guard Assistance  7. Sit to stand with maximum assistance x2 LRAD                         Time Tracking:     PT Received On: 24  PT Start Time: 1125     PT Stop Time: 1158  PT Total Time (min): 33 min     Billable Minutes: Therapeutic Activity 21 and Neuromuscular Re-education 12    Treatment Type: Treatment  PT/PTA: PTA     Number of PTA visits  since last PT visit: 2 07/01/2024

## 2024-07-01 NOTE — SUBJECTIVE & OBJECTIVE
Interval History: pt more alert and more comfortable     Review of Systems  Objective:     Vital Signs (Most Recent):  Temp: 98.3 °F (36.8 °C) (07/01/24 1511)  Pulse: 67 (07/01/24 1519)  Resp: 18 (07/01/24 1511)  BP: (!) 168/81 (07/01/24 1511)  SpO2: (!) 94 % (07/01/24 1511) Vital Signs (24h Range):  Temp:  [97.4 °F (36.3 °C)-98.3 °F (36.8 °C)] 98.3 °F (36.8 °C)  Pulse:  [62-93] 67  Resp:  [16-18] 18  SpO2:  [94 %-99 %] 94 %  BP: (129-189)/(60-84) 168/81     Weight: 83.9 kg (185 lb)  Body mass index is 33.84 kg/m².    Intake/Output Summary (Last 24 hours) at 7/1/2024 1834  Last data filed at 7/1/2024 1821  Gross per 24 hour   Intake 654.17 ml   Output 700 ml   Net -45.83 ml         Physical Exam  HENT:      Head: Normocephalic.   Eyes:      Pupils: Pupils are equal, round, and reactive to light.   Cardiovascular:      Rate and Rhythm: Normal rate and regular rhythm.      Pulses: Normal pulses.      Heart sounds: Normal heart sounds.   Pulmonary:      Effort: Pulmonary effort is normal. No respiratory distress.      Breath sounds: Normal breath sounds.   Abdominal:      General: Bowel sounds are normal.      Palpations: Abdomen is soft.   Musculoskeletal:      Right lower leg: No edema.      Left lower leg: No edema.   Skin:     Capillary Refill: Capillary refill takes less than 2 seconds.      Findings: Erythema and rash present.      Comments: Rash improving from admission baseline   Neurological:      Mental Status: She is alert. She is disoriented.      Comments: Orientation improving from admission baseline             Significant Labs: All pertinent labs within the past 24 hours have been reviewed.    Significant Imaging: I have reviewed all pertinent imaging results/findings within the past 24 hours.

## 2024-07-01 NOTE — PT/OT/SLP EVAL
Speech Language Pathology Evaluation  Bedside Swallow    Patient Name:  Dahlia Spence   MRN:  [id number]  Admitting Diagnosis: DRESS syndrome    Recommendations:                 General Recommendations:   Follow up x1 to ensure diet tolerance  Diet recommendations:  Regular Diet - IDDSI Level 7, Thin liquids - IDDSI Level 0   Aspiration Precautions: 1 bite/sip at a time, Alternating bites/sips, Assistance with meals, HOB to 90 degrees, and Small bites/sips   General Precautions: Standard, fall  Communication strategies:  none    Assessment:     Dahlia Spence is a 88 y.o. female who presents with a functional swallow for PO intake. Pt is at low risk for development of aspiration-related complications given immunocompromise, adequate oral health status, and decreased functional mobility SLP to follow up x1 to ensure diet tolerance vs need for soft solids given scattered dentition.     History:     Past Medical History:   Diagnosis Date    Arthritis     Gout     High cholesterol     Hypertension        Past Surgical History:   Procedure Laterality Date    ANKLE SURGERY      CHOLECYSTECTOMY      HYSTERECTOMY         Prior Intubation HX:  none this admit    Modified Barium Swallow: none on file    Chest X-Rays: none this admit    Prior diet: Regular/thin per pt report    Occupation/hobbies/homemaking: none stated    Subjective     Spoke with nursing prior to session. Pt found resting in bed upon SLP entry into room. Pt agreeable to participate in all aspects of session.      Patient goals: none stated     Pain/Comfort:  Pain Rating 1: 0/10    Respiratory Status: Room air    Objective:     Oral Musculature Evaluation  Oral Musculature: WFL  Dentition: scattered dentition  Secretion Management: adequate  Mucosal Quality: adequate  Mandibular Strength and Mobility: WFL  Oral Labial Strength and Mobility: functional seal  Lingual Strength and Mobility: functional protrusion, functional anterior elevation, functional lateral  movement  Voice Prior to PO Intake: clear, strong    Bedside Swallow Eval:   Consistencies Assessed:  Thin liquids: self-fed by the single and consecutive straw sips  Puree: self-fed by the tsp of pudding  Solid: self-fed nahum cracker      Oral Phase:   Prolonged mastication with solids though adequate oral clearance achieved independently and/or with cued liquid wash    Pharyngeal Phase:   no overt clinical signs/symptoms of aspiration  no overt clinical signs/symptoms of pharyngeal dysphagia    Compensatory Strategies  None    Treatment: SLP provided education regarding overall impressions, recommendations for regular diet with thin liquids, safe swallow strategies, and ongoing SLP POC. Pt verbalized understanding but would continue to benefit from ongoing education. Whiteboard updated.      Goals:   Multidisciplinary Problems       SLP Goals          Problem: SLP    Goal Priority Disciplines Outcome   SLP Goal     SLP    Description: Speech Pathology Goals  To be met by 7/15/24    1. Pt will exhibit a functional swallow for tolerance of a regular diet with thin liquids in order to maintain adequate hydration and nutrition                               Plan:     Plan of Care reviewed with:  patient   SLP Follow-Up:  No       Discharge recommendations:  No Therapy Indicated (likely no needs)   Barriers to Discharge:  None    Time Tracking:     SLP Treatment Date:   07/01/24  Speech Start Time:  1521  Speech Stop Time:  1531     Speech Total Time (min):  10 min    Billable Minutes: Eval Swallow and Oral Function 10      07/01/2024

## 2024-07-01 NOTE — ASSESSMENT & PLAN NOTE
Concern for DRESS clinically, started on IV and topical steroids. DRESS confirmed on pathologyID recommends to continue holding antibiotics in the setting of DRESS.     - Antihistamine, topical steroid  - IV methylpred, will reach out to derm regarding taper length  - Derm consulted, agree with DRESS, to continue IV solemedrol, follow their recs.  - Daptomycin started

## 2024-07-01 NOTE — ASSESSMENT & PLAN NOTE
HAGMA 2/2 to pre-renal JOSÉ    - was on HCO3 gtt, transitioned to LR; holding IVF in the setting of bilateral UE swelling and Cr returning to BL   - [facility] daily

## 2024-07-01 NOTE — SUBJECTIVE & OBJECTIVE
Interval History:   Afebrile. Rash improving/exfoliating.   Cx from knee obtained 6/19 (tissue debridement) reviewed and are positive for VRE and micrococcus.    Review of Systems   Constitutional:  Negative for diaphoresis and fever.   Gastrointestinal:  Negative for vomiting.   Musculoskeletal:  Positive for arthralgias.   Skin:  Positive for color change, rash and wound.   Psychiatric/Behavioral:  Positive for decreased concentration. Negative for agitation. The patient is not nervous/anxious.      Objective:     Vital Signs (Most Recent):  Temp: 98.3 °F (36.8 °C) (07/01/24 1511)  Pulse: 67 (07/01/24 1519)  Resp: 18 (07/01/24 1511)  BP: (!) 168/81 (07/01/24 1511)  SpO2: (!) 94 % (07/01/24 1511) Vital Signs (24h Range):  Temp:  [97.4 °F (36.3 °C)-98.3 °F (36.8 °C)] 98.3 °F (36.8 °C)  Pulse:  [62-93] 67  Resp:  [16-18] 18  SpO2:  [94 %-99 %] 94 %  BP: (129-189)/(60-91) 168/81     Weight: 83.9 kg (185 lb)  Body mass index is 33.84 kg/m².    Estimated Creatinine Clearance: 26.5 mL/min (A) (based on SCr of 1.5 mg/dL (H)).     Physical Exam  Vitals and nursing note reviewed.   Constitutional:       General: She is not in acute distress.     Appearance: She is well-developed. She is obese. She is ill-appearing.   HENT:      Head: Normocephalic and atraumatic.      Right Ear: External ear normal.      Left Ear: External ear normal.      Nose: Nose normal.   Eyes:      General: No scleral icterus.        Right eye: No discharge.         Left eye: No discharge.      Extraocular Movements: Extraocular movements intact.      Conjunctiva/sclera: Conjunctivae normal.   Cardiovascular:      Rate and Rhythm: Normal rate and regular rhythm.      Heart sounds: Normal heart sounds. No murmur heard.  Pulmonary:      Effort: Pulmonary effort is normal. No respiratory distress.      Breath sounds: Normal breath sounds. No stridor. No wheezing.   Abdominal:      General: Abdomen is flat. There is no distension.      Palpations: Abdomen  is soft.      Tenderness: There is no abdominal tenderness.   Musculoskeletal:         General: Tenderness and signs of injury present.      Cervical back: Normal range of motion.   Skin:     General: Skin is warm and dry.      Findings: Erythema and rash present.      Comments: Diffuse erythematous rash with skin peeling/desquamation present and improving  Large right knee skin defect with necrotic fibrinous tissue   Neurological:      Mental Status: She is alert. She is disoriented.      Comments: Alert but disoriented.    Psychiatric:         Mood and Affect: Mood normal.       6/24/24 updated photos:                                  Previous:                                                              Significant Labs: CBC:   Recent Labs   Lab 06/30/24  0531 07/01/24  0754   WBC 14.83* 18.20*   HGB 7.9* 7.8*   HCT 22.9* 23.5*    161     CMP:   Recent Labs   Lab 06/30/24  0531 07/01/24  0655    142   K 4.4 4.2    108   CO2 20* 20*   GLU 88 66*   BUN 58* 53*   CREATININE 1.7* 1.5*   CALCIUM 7.4* 7.2*   ALBUMIN 1.9*  --    ANIONGAP 16 14     Microbiology Results (last 7 days)       Procedure Component Value Units Date/Time    Blood culture #1 **CANNOT BE ORDERED STAT** [[id number]] Collected: 06/21/24 1443    Order Status: Completed Specimen: Blood from Peripheral, Antecubital, Right Updated: 06/26/24 1812     Blood Culture, Routine No growth after 5 days.    Blood culture #2 **CANNOT BE ORDERED STAT** [[id number]] Collected: 06/21/24 1443    Order Status: Completed Specimen: Blood from Peripheral, Antecubital, Right Updated: 06/26/24 1812     Blood Culture, Routine No growth after 5 days.          Recent Lab Results  (Last 5 results in the past 24 hours)        07/01/24  1236   07/01/24  0950   07/01/24  0754   07/01/24  0655   06/30/24 2027        Anion Gap       14         Baso #     0.03           Basophil %     0.2           BUN       53         Calcium       7.2         Chloride        108         CO2       20         Creatinine       1.5         Differential Method     Automated           eGFR       33.5         Eos #     0.2           Eos %     1.1           Glucose       66         Gran # (ANC)     15.1           Gran %     83.2           Hematocrit     23.5           Hemoglobin     7.8           Immature Grans (Abs)     0.28  Comment: Mild elevation in immature granulocytes is non specific and   can be seen in a variety of conditions including stress response,   acute inflammation, trauma and pregnancy. Correlation with other   laboratory and clinical findings is essential.             Immature Granulocytes     1.5           Lymph #     2.0           Lymph %     11.0           Magnesium      1.7           MCH     32.2           MCHC     33.2           MCV     97           Mono #     0.5           Mono %     3.0           MPV     12.2           nRBC     0           Phosphorus Level     3.8           Platelet Count     161           POCT Glucose 93   80       95       Potassium       4.2         RBC     2.42           RDW     21.3           Sodium       142         WBC     18.20                                  Significant Imaging:     Imaging Results    None

## 2024-07-01 NOTE — PROGRESS NOTES
[facility] - Transplant Stepdown  Infectious Disease  Progress Note    Patient Name: Dahlia Spence  MRN: [id number]  Admission Date: 6/21/2024  Length of Stay: 9 days  Attending Physician: Akil Gandhi III*  Primary Care Provider: Frandy Roe MD    Isolation Status: Contact  Assessment/Plan:      Oncology  * DRESS syndrome    88-year-old female with history of right TKA c/b distal femur periprosthetic fracture secondary to a fall s/p right knee revision of TKA with distal femoral replacement on 4/29 (completed empiric Linezolid > Daptomycin x 10 days through 5/11). Course complicated by right knee wound dehiscence s/p surgical I&D 5/16 followed by muscle flap/skin graft placement on 5/23. Cultures 5/16 grew Proteus and Pseudomonas. There were concerns for possible hardware involvement (Op note with dehiscence of inferior aspect of the capsular repair). Patient was discharged on six weeks of IV Zosyn from I&D (EOC 6/27). She followed up with ID on 6/6 and 6/19 and knee graft noted to be necrotic with slough present. Wound cultures 6/6 grew CONS and alpha hemolytic strep and tissue cultures (from debridement) 6/19 grew VRE and micrococcus. It is unclear if patient was started on a new antibiotic since this time. Awaiting records from case mgmt to clarify.     Patient presented with diffuse erythematous rash and tongue/facial swelling concerning for drug eruption and DRESS, which was confirmed on punch biopsy. She is improving with systemic steroids. In regards to her right knee, orthopedic surgery has no plans for acute intervention. Unclear if patient is being transferred to follow up with her established surgeon at  (Dr. Hernandez) or will see as an outpatient. She is currently afebrile. WBC 18K.     Recommendations  Start IV Daptomycin to cover VRE, micrococcus from tissue cultures obtained 6/19 during tissue debridement. CPK ordered  Patient completed appropriate course of antibiotics for acute PJI with  pseudomonas and proteus. Given retained hardware, ideally patient would transition to oral antibiotic suppression at this time. However, she is not a flouroquinolone candidate (QTC >500). The only options are hardware removal/exchange or AKA.   Continue systemic steroids per Dermatology  Once stable for discharge, please ensure patient has follow up scheduled with her established ID and orthopedic providers at .  Discussed plan with ID staff. ID will follow closely.         Thank you for the consult. Please secure chat for any questions.  Vanessa Pickering PA-C      Subjective:     Principal Problem:DRESS syndrome    HPI: Dahlia Spence is an 88-year-old female with past medical history of hypertension, hyperlipidemia, osteoarthritis, DM, PVD who presented to [facility] ED 6/21 for a diffuse skin rash.     Patient has a history of right TKA 4/22/2024. She was working with physical therapy postoperatively and sustained a distal femur periprosthetic fracture of the right knee secondary to a fall. Due to proximity with knee arthroplasty, she underwent a right knee revision of TKA with distal femoral replacement on 4/29/2024. Due to increased risk of post op infection, ID was consulted and recommended 10 days of empiric antibiotics. She was initially on Linezolid (d/c due to thrombocytopenia) and completed course with Daptomycin (EOC 5/11). Patient was discharged to a nursing facility though represented 05/15/2024 with wound dehiscence. On 5/16 she underwent surgical I&D followed by muscle flap and skin graft placement on 5/23. Cultures 5/16 grew pan susceptible Proteus and Pseudomonas. There were concerns for possible hardware involvement (Op note with dehiscence of inferior aspect of the capsular repair). ID recommended six weeks of IV Zosyn from I&D (EOC 6/27).    Patient followed up with ID on 6/6. Her wound had some evidence of slough and necrosis and she underwent light debridement. Wound cultures  grew CONS and alpha  hemalytic strep. Patient's daugher is at bedside and reports she was suppose to start a new oral antibiotic but doesn't believe the patient started in. Over the last week the patient has developed a full body rash and AMS. She was brought to the ED after complaining of tongue swelling.     In the ED: afebrile and VSS. WBC 20K, eosinophilia noted,K 3, Cr 5, , ESR 61. She was given IVF, Cefepime, antihistamine, steroids.  ID consulted for recommendations.  Interval History:   Afebrile. Rash improving/exfoliating.   Cx from knee obtained 6/19 (tissue debridement) reviewed and are positive for VRE and micrococcus.    Review of Systems   Constitutional:  Negative for diaphoresis and fever.   Gastrointestinal:  Negative for vomiting.   Musculoskeletal:  Positive for arthralgias.   Skin:  Positive for color change, rash and wound.   Psychiatric/Behavioral:  Positive for decreased concentration. Negative for agitation. The patient is not nervous/anxious.      Objective:     Vital Signs (Most Recent):  Temp: 98.3 °F (36.8 °C) (07/01/24 1511)  Pulse: 67 (07/01/24 1519)  Resp: 18 (07/01/24 1511)  BP: (!) 168/81 (07/01/24 1511)  SpO2: (!) 94 % (07/01/24 1511) Vital Signs (24h Range):  Temp:  [97.4 °F (36.3 °C)-98.3 °F (36.8 °C)] 98.3 °F (36.8 °C)  Pulse:  [62-93] 67  Resp:  [16-18] 18  SpO2:  [94 %-99 %] 94 %  BP: (129-189)/(60-91) 168/81     Weight: 83.9 kg (185 lb)  Body mass index is 33.84 kg/m².    Estimated Creatinine Clearance: 26.5 mL/min (A) (based on SCr of 1.5 mg/dL (H)).     Physical Exam  Vitals and nursing note reviewed.   Constitutional:       General: She is not in acute distress.     Appearance: She is well-developed. She is obese. She is ill-appearing.   HENT:      Head: Normocephalic and atraumatic.      Right Ear: External ear normal.      Left Ear: External ear normal.      Nose: Nose normal.   Eyes:      General: No scleral icterus.        Right eye: No discharge.         Left eye: No discharge.       Extraocular Movements: Extraocular movements intact.      Conjunctiva/sclera: Conjunctivae normal.   Cardiovascular:      Rate and Rhythm: Normal rate and regular rhythm.      Heart sounds: Normal heart sounds. No murmur heard.  Pulmonary:      Effort: Pulmonary effort is normal. No respiratory distress.      Breath sounds: Normal breath sounds. No stridor. No wheezing.   Abdominal:      General: Abdomen is flat. There is no distension.      Palpations: Abdomen is soft.      Tenderness: There is no abdominal tenderness.   Musculoskeletal:         General: Tenderness and signs of injury present.      Cervical back: Normal range of motion.   Skin:     General: Skin is warm and dry.      Findings: Erythema and rash present.      Comments: Diffuse erythematous rash with skin peeling/desquamation present and improving  Large right knee skin defect with necrotic fibrinous tissue   Neurological:      Mental Status: She is alert. She is disoriented.      Comments: Alert but disoriented.    Psychiatric:         Mood and Affect: Mood normal.       6/24/24 updated photos:                                  Previous:                                                              Significant Labs: CBC:   Recent Labs   Lab 06/30/24  0531 07/01/24  0754   WBC 14.83* 18.20*   HGB 7.9* 7.8*   HCT 22.9* 23.5*    161     CMP:   Recent Labs   Lab 06/30/24  0531 07/01/24  0655    142   K 4.4 4.2    108   CO2 20* 20*   GLU 88 66*   BUN 58* 53*   CREATININE 1.7* 1.5*   CALCIUM 7.4* 7.2*   ALBUMIN 1.9*  --    ANIONGAP 16 14     Microbiology Results (last 7 days)       Procedure Component Value Units Date/Time    Blood culture #1 **CANNOT BE ORDERED STAT** [[id number]] Collected: 06/21/24 1443    Order Status: Completed Specimen: Blood from Peripheral, Antecubital, Right Updated: 06/26/24 1812     Blood Culture, Routine No growth after 5 days.    Blood culture #2 **CANNOT BE ORDERED STAT** [[id number]] Collected: 06/21/24  1443    Order Status: Completed Specimen: Blood from Peripheral, Antecubital, Right Updated: 06/26/24 1812     Blood Culture, Routine No growth after 5 days.          Recent Lab Results  (Last 5 results in the past 24 hours)        07/01/24  1236   07/01/24  0950   07/01/24  0754   07/01/24  0655   06/30/24 2027        Anion Gap       14         Baso #     0.03           Basophil %     0.2           BUN       53         Calcium       7.2         Chloride       108         CO2       20         Creatinine       1.5         Differential Method     Automated           eGFR       33.5         Eos #     0.2           Eos %     1.1           Glucose       66         Gran # (ANC)     15.1           Gran %     83.2           Hematocrit     23.5           Hemoglobin     7.8           Immature Grans (Abs)     0.28  Comment: Mild elevation in immature granulocytes is non specific and   can be seen in a variety of conditions including stress response,   acute inflammation, trauma and pregnancy. Correlation with other   laboratory and clinical findings is essential.             Immature Granulocytes     1.5           Lymph #     2.0           Lymph %     11.0           Magnesium      1.7           MCH     32.2           MCHC     33.2           MCV     97           Mono #     0.5           Mono %     3.0           MPV     12.2           nRBC     0           Phosphorus Level     3.8           Platelet Count     161           POCT Glucose 93   80       95       Potassium       4.2         RBC     2.42           RDW     21.3           Sodium       142         WBC     18.20                                  Significant Imaging:     Imaging Results    None

## 2024-07-01 NOTE — PLAN OF CARE
Speech Language Pathology  Plan of Care      Speech Pathology Goals  To be met by 7/15/24    1. Pt will exhibit a functional swallow for tolerance of a regular diet with thin liquids in order to maintain adequate hydration and nutrition        Clinical swallow evaluation completed. Recommend a regular diet (IDDSI 7) with thin liquids (IDDSI 0) and meds whole 1 at a time. SLP to follow up x1 to ensure diet tolerance.

## 2024-07-01 NOTE — ASSESSMENT & PLAN NOTE
88-year-old female with history of right TKA c/b distal femur periprosthetic fracture secondary to a fall s/p right knee revision of TKA with distal femoral replacement on 4/29 (completed empiric Linezolid > Daptomycin x 10 days through 5/11). Course complicated by right knee wound dehiscence s/p surgical I&D 5/16 followed by muscle flap/skin graft placement on 5/23. Cultures 5/16 grew Proteus and Pseudomonas. There were concerns for possible hardware involvement (Op note with dehiscence of inferior aspect of the capsular repair). Patient was discharged on six weeks of IV Zosyn from I&D (EOC 6/27). She followed up with ID on 6/6 and 6/19 and knee graft noted to be necrotic with slough present. Wound cultures 6/6 grew CONS and alpha hemolytic strep and tissue cultures (from debridement) 6/19 grew VRE and micrococcus. It is unclear if patient was started on a new antibiotic since this time. Awaiting records from case mgmt to clarify.     Patient presented with diffuse erythematous rash and tongue/facial swelling concerning for drug eruption and DRESS, which was confirmed on punch biopsy. She is improving with systemic steroids. In regards to her right knee, orthopedic surgery has no plans for acute intervention. Unclear if patient is being transferred to follow up with her established surgeon at  (Dr. Hernandez) or will see as an outpatient. She is currently afebrile. WBC 18K.     Recommendations  Start IV Daptomycin to cover VRE, micrococcus from tissue cultures obtained 6/19 during tissue debridement. CPK ordered  Patient completed appropriate course of antibiotics for acute PJI with pseudomonas and proteus. Given retained hardware, ideally patient would transition to oral antibiotic suppression at this time. However, she is not a flouroquinolone candidate (QTC >500). The only options are hardware removal/exchange or AKA.   Continue systemic steroids per Dermatology  Once stable for discharge, please ensure patient  has follow up scheduled with her established ID and orthopedic providers at .  Discussed plan with ID staff. ID will follow closely.

## 2024-07-01 NOTE — ASSESSMENT & PLAN NOTE
.Pt had a recent TKA, fall after OR and femur fracture, requiring ORIF followed by wound infection and complex healing course. Bone cx alpha hemolytic strep. Required ABX therapy guided by ID c/o Linezolid followed by recent initiation of Zosyn therapy. off zosyn.   CT lower limb: infections vs inflammatory process in the absence of evidence of osteomyelitis    Plan:  - Ortho consulted, [facility] ortho team would like patient transferred to  under primary surgeon. If pt is unable to be safely transferred then ortho at [facility] will address her knee  - rediscuss with ID treatment options  - waiting for records from [facility]   - Attempts to transfer patient to [facility] at the same ortho and plastic surgery dept who operated on her in order to continue care for knee wound

## 2024-07-01 NOTE — NURSING
Patient transferred to the unit in bed. Aao x's 3 very pleasant. Hypertensive md notified no new orders given. Call light in reach bed in lowest position.

## 2024-07-01 NOTE — PLAN OF CARE
Problem: Adult Inpatient Plan of Care  Goal: Plan of Care Review  Outcome: Progressing  Goal: Patient-Specific Goal (Individualized)  Outcome: Progressing  Goal: Absence of Hospital-Acquired Illness or Injury  Outcome: Progressing  Goal: Optimal Comfort and Wellbeing  Outcome: Progressing  Goal: Readiness for Transition of Care  Outcome: Progressing     Problem: Diabetes Comorbidity  Goal: Blood Glucose Level Within Targeted Range  Outcome: Progressing     Problem: Wound  Goal: Optimal Coping  Outcome: Progressing  Goal: Optimal Functional Ability  Outcome: Progressing  Goal: Absence of Infection Signs and Symptoms  Outcome: Progressing  Goal: Improved Oral Intake  Outcome: Progressing  Goal: Optimal Pain Control and Function  Outcome: Progressing  Goal: Skin Health and Integrity  Outcome: Progressing  Goal: Optimal Wound Healing  Outcome: Progressing     Problem: Skin Injury Risk Increased  Goal: Skin Health and Integrity  Outcome: Progressing     Problem: Fall Injury Risk  Goal: Absence of Fall and Fall-Related Injury  Outcome: Progressing     Problem: Acute Kidney Injury/Impairment  Goal: Fluid and Electrolyte Balance  Outcome: Progressing  Goal: Improved Oral Intake  Outcome: Progressing  Goal: Effective Renal Function  Outcome: Progressing     Problem: Infection  Goal: Absence of Infection Signs and Symptoms  Outcome: Progressing     Problem: Mobility Impairment  Goal: Optimal Mobility  Outcome: Progressing     Problem: Restraint, Nonviolent  Goal: Absence of Harm or Injury  Outcome: Met

## 2024-07-01 NOTE — PLAN OF CARE
Evaluated patient at bedside today and staffed with attending physician. States she feels better and not as itchy. Clinically continues to have superficial exfoliation which is to be expected as dermatitis resolves. Overall clinically appears improved with resolution of facial swelling. Increase in WBC count to 18.20 today and restarted daptomycin per primary team/ID.   -Given improvement in clinical appearance and improvement in kidney function could consider transition to PO prednisone 80 mg daily, will need a prolonged taper  -Daptomycin less likely culprit but will continue to monitor for recurrence   -Continue topicals for symptomatic relief  -Continue following CBC w/ diff and BMP daily  -Dermatology will continue to follow

## 2024-07-01 NOTE — PROGRESS NOTES
[facility] Medicine  Progress Note    Patient Name: Dahlia Spence  MRN: [id number]  Patient Class: IP- Inpatient   Admission Date: 6/21/2024  Length of Stay: 9 days  Attending Physician: Akil Gandhi III*  Primary Care Provider: Frandy Roe MD        Subjective:     Principal Problem:DRESS syndrome        HPI:  88 YOF w/CKD3, T2DM, COPD, PVD 2/2 to DM, HTN, HLD, LBP, OA who presented for a skin rash. The patient had a recent I&D (05/16/2024) for her R knee wound and started zosyn. Then 7 days ago she developed full body hives with rash and tongue swelling. Family reports AMS and is not at her baseline.     Recent history: right distal femur replacement on 04/29/2024, then I&D of RLE 5/16/24, then dehiscence of the right TKA and underwent a right gastroc flap, split thickness skin graft, and wound VAC on 05/23/2024. Then the patient had a post op infection and had a 10 day course of linezolid to daptomycin. Then she had a second post op infection treated with a 6 week course of zosyn on  05/30/2024 and was seen by ID.     In the ED: patient received fluid resuscitation and IV ceftriaxone. She exhibited signs of minimal skin desqaumation amidst normal vital signs.  WBC 20.56  HGB 12.6     K 3.0  CRT 5.0 (baseline 1.5)    Overview/Hospital Course:  88 YOF w/CKD3, T2DM, COPD, PVD 2/2 to DM, HTN, HLD, LBP, OA who presented for a severe total body rash, AMS, and profound JOSÉ and is admitted for DRESS syndrome 2/2 to zosyn. The pt had a recent TKA, fall after OR and femur fracture, requiring ORIF followed by wound infection and complex healing course at  where she required ABX therapy guided by ID and started zosyn. Wound care, ID, ortho, derm, and nephro were consulted. For DRESS (confirmed on path) she is on IV and topical steroids, and it's improving. Per ortho the patient needs to follow up with his orthopedic surgeon at  and will only intervene if the patient  is unstable. ID recommends continuing to hold antibiotics in the setting of DRESS. Developed an JOSÉ this admission, which is likely 2/2 to prerenal, started on HCO3 gtt and transitioned to continuous LR, and now off IVF given improvement in JOSÉ and new upper extremity swelling. Started nightly seroquel for AMS 2/2 possibly to delirium from acute illness (neg CTH) which has been unchanged throughout admission. We obtained more knee imaging to better characterize prosthetic joint infection while EJ transfer situation is pending.  Worsening UE swelling bilaterally, obtained bilateral upper extremity US. CT lower limb, inflammatory/infectious process in the absence of osteomyelitis. Discussing with ID treatment options. UE US showed a superficial vein thrombus.     Interval History: pt more alert and more comfortable     Review of Systems  Objective:     Vital Signs (Most Recent):  Temp: 98.3 °F (36.8 °C) (07/01/24 1511)  Pulse: 67 (07/01/24 1519)  Resp: 18 (07/01/24 1511)  BP: (!) 168/81 (07/01/24 1511)  SpO2: (!) 94 % (07/01/24 1511) Vital Signs (24h Range):  Temp:  [97.4 °F (36.3 °C)-98.3 °F (36.8 °C)] 98.3 °F (36.8 °C)  Pulse:  [62-93] 67  Resp:  [16-18] 18  SpO2:  [94 %-99 %] 94 %  BP: (129-189)/(60-84) 168/81     Weight: 83.9 kg (185 lb)  Body mass index is 33.84 kg/m².    Intake/Output Summary (Last 24 hours) at 7/1/2024 1834  Last data filed at 7/1/2024 1821  Gross per 24 hour   Intake 654.17 ml   Output 700 ml   Net -45.83 ml         Physical Exam  HENT:      Head: Normocephalic.   Eyes:      Pupils: Pupils are equal, round, and reactive to light.   Cardiovascular:      Rate and Rhythm: Normal rate and regular rhythm.      Pulses: Normal pulses.      Heart sounds: Normal heart sounds.   Pulmonary:      Effort: Pulmonary effort is normal. No respiratory distress.      Breath sounds: Normal breath sounds.   Abdominal:      General: Bowel sounds are normal.      Palpations: Abdomen is soft.   Musculoskeletal:       Right lower leg: No edema.      Left lower leg: No edema.   Skin:     Capillary Refill: Capillary refill takes less than 2 seconds.      Findings: Erythema and rash present.      Comments: Rash improving from admission baseline   Neurological:      Mental Status: She is alert. She is disoriented.      Comments: Orientation improving from admission baseline             Significant Labs: All pertinent labs within the past 24 hours have been reviewed.    Significant Imaging: I have reviewed all pertinent imaging results/findings within the past 24 hours.    Assessment/Plan:      * DRESS syndrome  Concern for DRESS clinically, started on IV and topical steroids. DRESS confirmed on pathologyID recommends to continue holding antibiotics in the setting of DRESS.     - Antihistamine, topical steroid  - IV methylpred, will reach out to derm regarding taper length  - Derm consulted, agree with DRESS, to continue IV solemedrol, follow their recs.  - Daptomycin started        Eye abnormalities  Ophthal consulted, continue to  follow recs which are comprised of petroleum topical application        Weakness  PT/OT following     Acidosis  HAGMA 2/2 to pre-renal JOSÉ    - was on HCO3 gtt, transitioned to LR; holding IVF in the setting of bilateral UE swelling and Cr returning to BL   - [facility] daily      Hypokalemia    Lab Results   Component Value Date    K 4.2 07/01/2024   . Will continue potassium replacement per protocol and recheck repeat levels after replacement completed.     Metabolic acidosis, increased anion gap  See acidosis      JOSÉ (acute kidney injury)  Patient with acute kidney injury/acute renal failure likely due to pre-renal azotemia due to dehydration JOSÉ is currently improving. Baseline creatinine  1.5  - Labs reviewed- Renal function/electrolytes with Estimated Creatinine Clearance: 26.5 mL/min (A) (based on SCr of 1.5 mg/dL (H)). according to latest data. Monitor urine output and serial BMP and adjust therapy as  needed. Avoid nephrotoxins and renally dose meds for GFR listed above.    Open wound of right knee s/p R distal femoral replacement, I&D, and gastrocnemius flap  .Pt had a recent TKA, fall after OR and femur fracture, requiring ORIF followed by wound infection and complex healing course. Bone cx alpha hemolytic strep. Required ABX therapy guided by ID c/o Linezolid followed by recent initiation of Zosyn therapy. off zosyn.   CT lower limb: infections vs inflammatory process in the absence of evidence of osteomyelitis    Plan:  - Ortho consulted, [facility] ortho team would like patient transferred to  under primary surgeon. If pt is unable to be safely transferred then ortho at [facility] will address her knee  - rediscuss with ID treatment options  - [facility] records [facility]   - Attempts to transfer patient to [facility] at the same ortho and plastic surgery dept who operated on her in order to continue care for knee wound         Type 2 diabetes mellitus with other circulatory complications  Low dose ssi as needed prn  HBA1C      Chronic obstructive pulmonary disease, unspecified  Supplemental oxygen as needed   Duo-nebs  Target SPO2 >88%    Chronic kidney disease, stage III (moderate)  JOSÉ on CKD 2/2 to pre-renal JOSÉ s/p HCO3 gtt and IVF    Plan:  - Nephrology consulted, Cr returning to      Benign essential hypertension  - Nifedipine started for BP control       VTE Risk Mitigation (From admission, onward)           Ordered     heparin (porcine) injection 5,000 Units  Every 8 hours         06/21/24 1803     IP VTE HIGH RISK PATIENT  Once         06/21/24 1803     Place sequential compression device  Until discontinued         06/21/24 1803                    Discharge Planning   ACE: 7/4/2024     Code Status: DNR   Is the patient medically ready for discharge?:     Reason for patient still in hospital (select all that apply): Patient trending condition  Discharge Plan A: Long-term acute care facility (LTAC)    Discharge Delays: None known at this time              Cat Dooley MD  Department of Hospital Medicine   [facility] - Transplant Stepdown

## 2024-07-01 NOTE — PT/OT/SLP PROGRESS
Occupational Therapy   Co-Treatment with PT  Co-treatment performed due to patient's multiple deficits requiring two skilled therapists to appropriately and safely assess patient's strength and endurance while facilitating functional tasks in addition to accommodating for patient's activity tolerance.     Name: Dahlia Spence  MRN: [id number]  Admitting Diagnosis:  DRESS syndrome       Recommendations:     Discharge Recommendations: Moderate Intensity Therapy  Discharge Equipment Recommendations:  hospital bed  Barriers to discharge:       Assessment:     Dahlia Spence is a 88 y.o. female with a medical diagnosis of DRESS syndrome.  She presents with the following performance deficits affecting function: weakness, impaired endurance, impaired self care skills, impaired functional mobility, gait instability, decreased safety awareness, decreased lower extremity function, decreased upper extremity function, decreased coordination, impaired cognition, impaired cardiopulmonary response to activity, orthopedic precautions.     Rehab Prognosis:  Good; patient would benefit from acute skilled OT services to address these deficits and reach maximum level of function.       Plan:     Patient to be seen 4 x/week to address the above listed problems via self-care/home management, therapeutic activities, therapeutic exercises, neuromuscular re-education, cognitive retraining  Plan of Care Expires: 07/25/24  Plan of Care Reviewed with: patient, daughter    Subjective     Patient/Family Comments/goals: to improve function  Pain/Comfort:  Pain Rating 1: 0/10  Pain Rating Post-Intervention 1: 0/10    Objective:     Communicated with: RN prior to session.  Patient found HOB elevated with telemetry, knee immobilizer, blood pressure cuff, rueda catheter, peripheral IV upon OT entry to room.    General Precautions: Standard, fall    Orthopedic Precautions:RLE non weight bearing  Braces: Knee immobilizer  Respiratory Status: Room air      Occupational Performance:     Bed Mobility:    Patient completed Scooting/Bridging with maximal assistance and 2 persons  Patient completed Supine to Sit with moderate assistance and 2 persons    Functional Mobility/Transfers:  Patient completed Sit <> Stand Transfer with total assistance and of 2 persons  with  no assistive device but unsuccessful d/t not following precautions and weakness    Activities of Daily Living:  Grooming: stand by assistance for oral hygiene seeated EOB      [facility] 6 Click ADL: 13    Treatment & Education:  Pt educated on OT POC and frequency during hospital stay.   Pt educated on proper hand placement and techniques for RW mgmt to improve safety awareness.   Pt educated on importance of OOB activity to improve function and activity tolerance.  Pt educated on RLE WB precautions.   Addressed all patient questions/concerns within [facility] scope of practice.     Patient left sitting edge of bed with all lines intact, call button in reach, RN notified, and daughter present    GOALS:   Multidisciplinary Problems       Occupational Therapy Goals          Problem: Occupational Therapy    Goal Priority Disciplines Outcome Interventions   Occupational Therapy Goal     OT, PT/OT Progressing    Description: Goals to be met by: 7/2/2024     Patient will increase functional independence with ADLs by performing:    UE Dressing with Moderate Assistance.  LE Dressing with Maximum Assistance.  Grooming while EOB with Maximum Assistance.  Toileting from bedside commode with Maximum Assistance for hygiene and clothing management.   Toilet transfer to bedside commode with Maximum Assistance.    DME Justifications     Hospital Bed ·       Patient requires a hospital bed for positioning of the body in ways that are not feasible with an ordinary bed. The patient requires special positioning for pain relief, limited mobility, and/or being unable to independently make changes in body position without the use of a  hospital bed. Pillows and wedges will not be adequate for resolving these positional issues.                              Time Tracking:     OT Date of Treatment: 07/01/24  OT Start Time: 1125  OT Stop Time: 1158  OT Total Time (min): 33 min    Billable Minutes:Therapeutic Activity 22  Therapeutic Exercise 11    OT/MANDI: MANDI     Number of MANDI visits since last OT visit: 3    7/1/2024

## 2024-07-01 NOTE — NURSING
Patient transferred to [facility] via bed, accompanied by RN & CNA.  Telemetry monitoring during transport & bedside RN to apply new box.  All medications, wound care supplies and personal belongings sent with patient.

## 2024-07-02 PROBLEM — D69.6 THROMBOCYTOPENIA: Status: ACTIVE | Noted: 2024-07-02

## 2024-07-02 PROBLEM — I50.32 CHRONIC DIASTOLIC HEART FAILURE: Status: ACTIVE | Noted: 2024-07-02

## 2024-07-02 PROBLEM — D62 ACUTE BLOOD LOSS ANEMIA: Status: ACTIVE | Noted: 2024-07-02

## 2024-07-02 LAB
ANION GAP SERPL CALC-SCNC: 16 MMOL/L (ref 8–16)
BASOPHILS # BLD AUTO: 0.02 K/UL (ref 0–0.2)
BASOPHILS NFR BLD: 0.1 % (ref 0–1.9)
BUN SERPL-MCNC: 50 MG/DL (ref 8–23)
CALCIUM SERPL-MCNC: 7.3 MG/DL (ref 8.7–10.5)
CHLORIDE SERPL-SCNC: 107 MMOL/L (ref 95–110)
CK SERPL-CCNC: 26 U/L (ref 20–180)
CO2 SERPL-SCNC: 21 MMOL/L (ref 23–29)
CREAT SERPL-MCNC: 1.3 MG/DL (ref 0.5–1.4)
DIFFERENTIAL METHOD BLD: ABNORMAL
EOSINOPHIL # BLD AUTO: 0 K/UL (ref 0–0.5)
EOSINOPHIL NFR BLD: 0 % (ref 0–8)
ERYTHROCYTE [DISTWIDTH] IN BLOOD BY AUTOMATED COUNT: 20.9 % (ref 11.5–14.5)
EST. GFR  (NO RACE VARIABLE): 39.8 ML/MIN/1.73 M^2
GLUCOSE SERPL-MCNC: 136 MG/DL (ref 70–110)
HCT VFR BLD AUTO: 22.3 % (ref 37–48.5)
HGB BLD-MCNC: 7.5 G/DL (ref 12–16)
IMM GRANULOCYTES # BLD AUTO: 0.19 K/UL (ref 0–0.04)
IMM GRANULOCYTES NFR BLD AUTO: 1.4 % (ref 0–0.5)
LYMPHOCYTES # BLD AUTO: 1.6 K/UL (ref 1–4.8)
LYMPHOCYTES NFR BLD: 11.9 % (ref 18–48)
MAGNESIUM SERPL-MCNC: 1.8 MG/DL (ref 1.6–2.6)
MCH RBC QN AUTO: 32.8 PG (ref 27–31)
MCHC RBC AUTO-ENTMCNC: 33.6 G/DL (ref 32–36)
MCV RBC AUTO: 97 FL (ref 82–98)
MONOCYTES # BLD AUTO: 0.3 K/UL (ref 0.3–1)
MONOCYTES NFR BLD: 2.2 % (ref 4–15)
NEUTROPHILS # BLD AUTO: 11.3 K/UL (ref 1.8–7.7)
NEUTROPHILS NFR BLD: 84.4 % (ref 38–73)
NRBC BLD-RTO: 0 /100 WBC
PHOSPHATE SERPL-MCNC: 3.7 MG/DL (ref 2.7–4.5)
PLATELET # BLD AUTO: 147 K/UL (ref 150–450)
PMV BLD AUTO: 13.2 FL (ref 9.2–12.9)
POCT GLUCOSE: 159 MG/DL (ref 70–110)
POCT GLUCOSE: 203 MG/DL (ref 70–110)
POCT GLUCOSE: 212 MG/DL (ref 70–110)
POCT GLUCOSE: 93 MG/DL (ref 70–110)
POTASSIUM SERPL-SCNC: 4.1 MMOL/L (ref 3.5–5.1)
RBC # BLD AUTO: 2.29 M/UL (ref 4–5.4)
SODIUM SERPL-SCNC: 144 MMOL/L (ref 136–145)
WBC # BLD AUTO: 13.39 K/UL (ref 3.9–12.7)

## 2024-07-02 PROCEDURE — 84100 ASSAY OF PHOSPHORUS: CPT

## 2024-07-02 PROCEDURE — A4216 STERILE WATER/SALINE, 10 ML: HCPCS | Performed by: INTERNAL MEDICINE

## 2024-07-02 PROCEDURE — 99233 SBSQ HOSP IP/OBS HIGH 50: CPT | Mod: ,,, | Performed by: PHYSICIAN ASSISTANT

## 2024-07-02 PROCEDURE — 20600001 HC STEP DOWN PRIVATE ROOM

## 2024-07-02 PROCEDURE — 27000207 HC ISOLATION

## 2024-07-02 PROCEDURE — 25000003 PHARM REV CODE 250

## 2024-07-02 PROCEDURE — 92526 ORAL FUNCTION THERAPY: CPT

## 2024-07-02 PROCEDURE — 83735 ASSAY OF MAGNESIUM: CPT

## 2024-07-02 PROCEDURE — 63600175 PHARM REV CODE 636 W HCPCS

## 2024-07-02 PROCEDURE — 80048 BASIC METABOLIC PNL TOTAL CA: CPT | Performed by: FAMILY MEDICINE

## 2024-07-02 PROCEDURE — 25000003 PHARM REV CODE 250: Performed by: INTERNAL MEDICINE

## 2024-07-02 PROCEDURE — 94761 N-INVAS EAR/PLS OXIMETRY MLT: CPT

## 2024-07-02 PROCEDURE — 36415 COLL VENOUS BLD VENIPUNCTURE: CPT

## 2024-07-02 PROCEDURE — 82550 ASSAY OF CK (CPK): CPT | Performed by: PHYSICIAN ASSISTANT

## 2024-07-02 PROCEDURE — 85025 COMPLETE CBC W/AUTO DIFF WBC: CPT

## 2024-07-02 RX ORDER — PREDNISONE 20 MG/1
20 TABLET ORAL DAILY
Status: DISCONTINUED | OUTPATIENT
Start: 2024-07-24 | End: 2024-07-04 | Stop reason: HOSPADM

## 2024-07-02 RX ORDER — PREDNISONE 20 MG/1
80 TABLET ORAL DAILY
Status: DISCONTINUED | OUTPATIENT
Start: 2024-07-03 | End: 2024-07-04 | Stop reason: HOSPADM

## 2024-07-02 RX ORDER — PREDNISONE 20 MG/1
40 TABLET ORAL DAILY
Status: DISCONTINUED | OUTPATIENT
Start: 2024-07-17 | End: 2024-07-04 | Stop reason: HOSPADM

## 2024-07-02 RX ORDER — PREDNISONE 20 MG/1
60 TABLET ORAL DAILY
Status: DISCONTINUED | OUTPATIENT
Start: 2024-07-10 | End: 2024-07-04 | Stop reason: HOSPADM

## 2024-07-02 RX ORDER — PREDNISONE 5 MG/1
10 TABLET ORAL DAILY
Status: DISCONTINUED | OUTPATIENT
Start: 2024-07-31 | End: 2024-07-04 | Stop reason: HOSPADM

## 2024-07-02 RX ADMIN — WHITE PETROLATUM: 1.75 OINTMENT TOPICAL at 09:07

## 2024-07-02 RX ADMIN — PREDNISONE 80 MG: 20 TABLET ORAL at 08:07

## 2024-07-02 RX ADMIN — CETIRIZINE HYDROCHLORIDE 5 MG: 5 TABLET, FILM COATED ORAL at 08:07

## 2024-07-02 RX ADMIN — TRIAMCINOLONE ACETONIDE: 1 CREAM TOPICAL at 08:07

## 2024-07-02 RX ADMIN — HEPARIN SODIUM 5000 UNITS: 5000 INJECTION INTRAVENOUS; SUBCUTANEOUS at 06:07

## 2024-07-02 RX ADMIN — ATORVASTATIN CALCIUM 10 MG: 10 TABLET, FILM COATED ORAL at 08:07

## 2024-07-02 RX ADMIN — MUPIROCIN: 20 OINTMENT TOPICAL at 08:07

## 2024-07-02 RX ADMIN — NIFEDIPINE 30 MG: 30 TABLET, FILM COATED, EXTENDED RELEASE ORAL at 08:07

## 2024-07-02 RX ADMIN — CALCIUM CARBONATE (ANTACID) CHEW TAB 500 MG 500 MG: 500 CHEW TAB at 08:07

## 2024-07-02 RX ADMIN — MINERAL OIL AND WHITE PETROLATUM: 30; 940 OINTMENT OPHTHALMIC at 09:07

## 2024-07-02 RX ADMIN — MINERAL OIL AND WHITE PETROLATUM: 30; 940 OINTMENT OPHTHALMIC at 08:07

## 2024-07-02 RX ADMIN — HEPARIN SODIUM 5000 UNITS: 5000 INJECTION INTRAVENOUS; SUBCUTANEOUS at 10:07

## 2024-07-02 RX ADMIN — Medication 10 ML: at 12:07

## 2024-07-02 RX ADMIN — WHITE PETROLATUM: 1.75 OINTMENT TOPICAL at 08:07

## 2024-07-02 RX ADMIN — HEPARIN SODIUM 5000 UNITS: 5000 INJECTION INTRAVENOUS; SUBCUTANEOUS at 02:07

## 2024-07-02 RX ADMIN — QUETIAPINE FUMARATE 25 MG: 25 TABLET ORAL at 08:07

## 2024-07-02 RX ADMIN — MUPIROCIN: 20 OINTMENT TOPICAL at 09:07

## 2024-07-02 RX ADMIN — Medication 10 ML: at 05:07

## 2024-07-02 RX ADMIN — Medication 10 ML: at 06:07

## 2024-07-02 RX ADMIN — HYDROXYZINE HYDROCHLORIDE 25 MG: 25 TABLET ORAL at 08:07

## 2024-07-02 NOTE — PT/OT/SLP PROGRESS
Speech Language Pathology Treatment/Discharge Summary    Patient Name:  Dahlia Spence   MRN:  [id number]  Admitting Diagnosis: DRESS syndrome    Recommendations:                 General Recommendations:  Follow-up not indicated  Diet recommendations:  Regular Diet - IDDSI Level 7, Liquid Diet Level: Thin liquids - IDDSI Level 0   Aspiration Precautions: 1 bite/sip at a time, Alternating bites/sips, Assistance with meals, HOB to 90 degrees, and Small bites/sips   General Precautions: Standard, fall  Communication strategies:  none    Assessment:     Dahlia Spence is a 88 y.o. female who presents with a functional swallow for continued PO intake of an unmodified diet. No additional skilled speech services required at this time.      Subjective     Pt found resting in bed with nursing at bedside upon SLP entry into room. Pt agreeable to participate in all aspects of session.      Patient goals: none stated     Pain/Comfort:  Pain Rating 1: 0/10    Respiratory Status: Room air    Objective:     Has the patient been evaluated by SLP for swallowing?   Yes  Keep patient NPO? No   Current Respiratory Status:        Pt seen for ongoing dysphagia therapy. She endorsed adequate tolerance of current regular diet with thin liquids. HOB elevated for all PO intake. Pt consumed self-regulated bites of nahum crackers with ongoing increased mastication time noted though slightly improved since assessment yesterday. MILD oral residue exhibited inconsistently with solid trials though was easily cleared with spontaneous and/or cued liquid washes. Sips of thin liquids via open cup and straw sip tolerated without difficulty. SLP provided education regarding overall impressions, continuation of regular diet with thin liquids, and ongoing SLP POC. Pt verbalized understanding and had no additional questions or concerns upon SLP exit. Whiteboard updated.       Goals:   Multidisciplinary Problems       SLP Goals       Not on file               Multidisciplinary Problems (Resolved)          Problem: SLP    Goal Priority Disciplines Outcome   SLP Goal   (Resolved)     SLP Met   Description: Speech Pathology Goals  To be met by 7/15/24    1. Pt will exhibit a functional swallow for tolerance of a regular diet with thin liquids in order to maintain adequate hydration and nutrition                               Plan:     Plan of Care reviewed with:  patient   SLP Follow-Up:  No       Discharge recommendations:  No Therapy Indicated   Barriers to Discharge:  None    Time Tracking:     SLP Treatment Date:   07/02/24  Speech Start Time:  0830  Speech Stop Time:  0837     Speech Total Time (min):  7 min    Billable Minutes: Treatment Swallowing Dysfunction 7 07/02/2024

## 2024-07-02 NOTE — SUBJECTIVE & OBJECTIVE
Interval History: Pt is more alert and comfortable.     Review of Systems  Objective:     Vital Signs (Most Recent):  Temp: 97.9 °F (36.6 °C) (07/02/24 1151)  Pulse: 76 (07/02/24 1151)  Resp: 18 (07/02/24 1151)  BP: (!) 162/88 (07/02/24 1151)  SpO2: 100 % (07/02/24 1151) Vital Signs (24h Range):  Temp:  [97.5 °F (36.4 °C)-98.7 °F (37.1 °C)] 97.9 °F (36.6 °C)  Pulse:  [67-92] 76  Resp:  [12-18] 18  SpO2:  [94 %-100 %] 100 %  BP: (140-173)/() 162/88     Weight: 83.9 kg (185 lb)  Body mass index is 33.84 kg/m².    Intake/Output Summary (Last 24 hours) at 7/2/2024 1428  Last data filed at 7/1/2024 2201  Gross per 24 hour   Intake 604.17 ml   Output 350 ml   Net 254.17 ml         Physical Exam  Vitals and nursing note reviewed.   Constitutional:       General: She is not in acute distress.     Appearance: Normal appearance.   Eyes:      Extraocular Movements: Extraocular movements intact.      Pupils: Pupils are equal, round, and reactive to light.   Cardiovascular:      Rate and Rhythm: Normal rate and regular rhythm.      Heart sounds: Normal heart sounds. No murmur heard.     No gallop.   Pulmonary:      Breath sounds: Normal breath sounds. No stridor. No wheezing.   Abdominal:      General: Bowel sounds are normal.   Neurological:      Mental Status: She is alert and oriented to person, place, and time.   Psychiatric:         Mood and Affect: Mood normal.             Significant Labs: All pertinent labs within the past 24 hours have been reviewed.    Significant Imaging: I have reviewed all pertinent imaging results/findings within the past 24 hours.

## 2024-07-02 NOTE — PLAN OF CARE
MAHSA spoke with pt daughter [phone]. Daughter stated that she was under the impression that pt was going to transfer from [facility] to [facility] to complete her surgery.   Daughter stated that pt can't come home in this condition.     SW reached out to [facility] and pt was there(previously) for skilled nursing and they stated that pt was discharged from skilled nursing facility.     MAHSA is working with medical team to find out final discharge plan for pt.     MAHSA requested MD to contact daughter for more clarity.  Will follow up.    Marifer Celeste MSW, CSW

## 2024-07-02 NOTE — PROGRESS NOTES
[facility] ([place] [address])  Wound Care    Patient Name:  Dahlia Spence   MRN:  [id number]  Date: 7/2/2024  Diagnosis: DRESS syndrome    History:     Past Medical History:   Diagnosis Date    Arthritis     Gout     High cholesterol     Hypertension        Social History     Socioeconomic History    Marital status:    Tobacco Use    Smoking status: Never    Smokeless tobacco: Never   Substance and Sexual Activity    Alcohol use: No    Drug use: No     Social Determinants of Health     Financial Resource Strain: Patient Unable To Answer (6/24/2024)    Overall Financial Resource Strain (CARDIA)     Difficulty of Paying Living Expenses: Patient unable to answer   Recent Concern: Financial Resource Strain - High Risk (6/23/2024)    Overall Financial Resource Strain (CARDIA)     Difficulty of Paying Living Expenses: Very hard   Food Insecurity: Patient Unable To Answer (6/24/2024)    Hunger Vital Sign     Worried About Running Out of Food in the Last Year: Patient unable to answer     Ran Out of Food in the Last Year: Patient unable to answer   Recent Concern: Food Insecurity - Food Insecurity Present (6/23/2024)    Hunger Vital Sign     Worried About Running Out of Food in the Last Year: Sometimes true     Ran Out of Food in the Last Year: Sometimes true   Transportation Needs: Patient Unable To Answer (6/24/2024)    TRANSPORTATION NEEDS     Transportation : Patient unable to answer   Physical Activity: Patient Unable To Answer (6/24/2024)    Exercise Vital Sign     Days of Exercise per Week: Patient unable to answer     Minutes of Exercise per Session: Patient unable to answer   Stress: Patient Unable To Answer (6/24/2024)    [employer] - Occupational Stress Questionnaire     Feeling of Stress : Patient unable to answer   Recent Concern: Stress - Stress Concern Present (6/23/2024)    [employer] - Occupational Stress Questionnaire     Feeling  of Stress : To some extent   Housing Stability: Patient Unable To Answer (6/24/2024)    Housing Stability Vital Sign     Unable to Pay for Housing in the Last Year: Patient unable to answer     Homeless in the Last Year: Patient unable to answer       Precautions:     Allergies as of 06/21/2024 - Reviewed 06/21/2024   Allergen Reaction Noted    Zosyn [piperacillin-tazobactam] Hives 06/21/2024    Gabapentin Hallucinations 10/07/2021       WOC Assessment Details/Treatment     Patient seen for wound care follow up for buttocks, R knee.   Reviewed chart for this encounter.   See Flow Sheet for findings.     Pt found lying in bed w/ daughter at bedside, agreeable to care at this time. Pt turned into lateral position for assessment of buttocks - incontinence associated dermatitis improving from last week. Triad applied for moisture barrier protection.   Knee brace and  dressing removed from R knee for assessment. Wound bed is mostly covered by adhered slough and necrotic tissue. Wound cleansed w/ Vashe and dressed w/ Vashe moistened gauze for antimicrobial properties and to support autolytic debridement. Gauze then covered w/ ABD pad for drainage and secured w/ cast padding. Pt on waffle mattress w/ proper inflation.    RECOMMENDATIONS:  BID - R knee - bedside nursing to remove old dressing and rinse wound bed w/ Vashe. Pat dry and apply Cavilon barrier to negra-wound. Cover wound bed w/ Vashe moistened gauze followed by ABD pad, secure w/ cast padding or light kerlix wrap.     BID/PRN - buttocks, perirectal, groin - cleanse gently w/ soap and water, pat dry and apply Triad to affected area, leave MANDI, no foam dressings.     Discussed POC with patient and primary nurse.   See EMR for orders & patient education.     Bedside nursing to continue care & monitoring.  Bedside nursing to maintain pressure injury prevention interventions.     07/02/24 1121   WOCN Assessment   WOCN Total Time (mins) 30   Visit Date 07/02/24   Visit  Time 1121   Consult Type Follow Up   WOCN Speciality Wound   Wound surgical;moisture   Intervention assessed;changed;applied;chart review;coordination of care;orders   Teaching on-going   Pressure Injury Prevention    Check Moisture Management Pad Done   Sacral Foam Dressing Patient incontinent, barrier cream applied   Heel protection technique Heel boot   Check Medical Devices Done        Wound 06/21/24 Moisture associated dermatitis Perirectal #1   Date First Assessed: 06/21/24   Primary Wound Type: (c) Moisture associated dermatitis  Location: Perirectal  Wound Number: #1   Dressing Appearance Open to air   Drainage Amount None   Drainage Characteristics/Odor No odor   Appearance Pink;Moist   Tissue loss description Partial thickness   Periwound Area Intact;Moist   Wound Edges Irregular;Open   Care Cleansed with:;Soap and water;Applied:;Skin Barrier        Wound 06/22/24 0750 Incision Right anterior;lower Knee vertical   Date First Assessed/Time First Assessed: 06/22/24 0750   Present on Original Admission: Yes  Primary Wound Type: Incision  Side: Right  Orientation: anterior;lower  Location: Knee  Wound Approximate Age at First Assessment (Weeks): 8 weeks  Incision T...   Wound Image    Dressing Appearance Moist drainage   Drainage Amount Small   Drainage Characteristics/Odor Serosanguineous   Appearance Pink;Red;Slough;Necrotic   Periwound Area Intact;Dry   Wound Edges Open;Defined   Wound Length (cm) 15.1 cm   Wound Width (cm) 12.1 cm   Wound Depth (cm) 1.6 cm   Wound Volume (cm^3) 292.336 cm^3   Wound Surface Area (cm^2) 182.71 cm^2   Care Cleansed with:;Wound cleanser   Dressing Applied;Gauze, wet to dry;Absorptive Pad;Cast padding   Dressing Change Due 07/03/24

## 2024-07-02 NOTE — ASSESSMENT & PLAN NOTE
Concern for DRESS clinically, started on IV and topical steroids. DRESS confirmed on pathologyID recommends to continue holding antibiotics in the setting of DRESS.     - Antihistamine, topical steroid  -Derm consulted, agree with DRESS, follow their recs.  - DC'd IV solemedrol, will follow derm regarding oral predinisone taper length

## 2024-07-02 NOTE — ASSESSMENT & PLAN NOTE
HAGMA 2/2 to pre-renal JOSÉ    - was on HCO3 gtt, transitioned to LR; holding IVF in the setting of bilateral UE swelling and Cr returning to BL   - [facility] daily

## 2024-07-02 NOTE — SUBJECTIVE & OBJECTIVE
Interval History:   Afebrile. Rash continues to improve. Dapto started yesterday as cultures from knee obtained 6/19 (tissue debridement) are positive for VRE and micrococcus. Per primary team, planning to discharge patient soon.    Review of Systems   Constitutional:  Negative for diaphoresis and fever.   Gastrointestinal:  Negative for vomiting.   Musculoskeletal:  Positive for arthralgias.   Skin:  Positive for color change, rash and wound.   Psychiatric/Behavioral:  Negative for agitation. The patient is not nervous/anxious.      Objective:     Vital Signs (Most Recent):  Temp: 97.9 °F (36.6 °C) (07/02/24 1151)  Pulse: 76 (07/02/24 1151)  Resp: 18 (07/02/24 1151)  BP: (!) 162/88 (07/02/24 1151)  SpO2: 100 % (07/02/24 1151) Vital Signs (24h Range):  Temp:  [97.5 °F (36.4 °C)-98.7 °F (37.1 °C)] 97.9 °F (36.6 °C)  Pulse:  [67-92] 76  Resp:  [12-18] 18  SpO2:  [94 %-100 %] 100 %  BP: (140-173)/() 162/88     Weight: 83.9 kg (185 lb)  Body mass index is 33.84 kg/m².    Estimated Creatinine Clearance: 30.6 mL/min (based on SCr of 1.3 mg/dL).     Physical Exam  Vitals and nursing note reviewed.   Constitutional:       General: She is not in acute distress.     Appearance: She is well-developed. She is obese. She is ill-appearing.   HENT:      Head: Normocephalic and atraumatic.      Right Ear: External ear normal.      Left Ear: External ear normal.      Nose: Nose normal.   Eyes:      General: No scleral icterus.        Right eye: No discharge.         Left eye: No discharge.      Extraocular Movements: Extraocular movements intact.      Conjunctiva/sclera: Conjunctivae normal.   Cardiovascular:      Rate and Rhythm: Normal rate and regular rhythm.      Heart sounds: Normal heart sounds. No murmur heard.  Pulmonary:      Effort: Pulmonary effort is normal. No respiratory distress.      Breath sounds: Normal breath sounds. No stridor. No wheezing.   Abdominal:      General: Abdomen is flat. There is no distension.       Palpations: Abdomen is soft.      Tenderness: There is no abdominal tenderness.   Musculoskeletal:         General: Swelling and tenderness present.      Cervical back: Normal range of motion.   Skin:     General: Skin is warm and dry.      Findings: Bruising (LUE), erythema and rash present.      Comments: Diffuse erythematous rash with skin peeling/desquamation improving  Large right knee skin defect with fibrinous tissue present. Sutures still in place   Neurological:      Mental Status: She is alert.   Psychiatric:         Mood and Affect: Mood normal.         Behavior: Behavior normal.       7/2/24 7/1/24 6/24/24                                  Previous:                                                              Significant Labs: CBC:   Recent Labs   Lab 07/01/24  0754 07/02/24  0757   WBC 18.20* 13.39*   HGB 7.8* 7.5*   HCT 23.5* 22.3*    147*     CMP:   Recent Labs   Lab 07/01/24  0655 07/02/24  0757    144   K 4.2 4.1    107   CO2 20* 21*   GLU 66* 136*   BUN 53* 50*   CREATININE 1.5* 1.3   CALCIUM 7.2* 7.3*   ANIONGAP 14 16     Microbiology Results (last 7 days)       Procedure Component Value Units Date/Time    Blood culture #1 **CANNOT BE ORDERED STAT** [[id number]] Collected: 06/21/24 1443    Order Status: Completed Specimen: Blood from Peripheral, Antecubital, Right Updated: 06/26/24 1812     Blood Culture, Routine No growth after 5 days.    Blood culture #2 **CANNOT BE ORDERED STAT** [[id number]] Collected: 06/21/24 1443    Order Status: Completed Specimen: Blood from Peripheral, Antecubital, Right Updated: 06/26/24 1812     Blood Culture, Routine No growth after 5 days.          Recent Lab Results  (Last 5 results in the past 24 hours)        07/02/24  1150   07/02/24  0757   07/02/24  0718   07/01/24  2150   07/01/24  1813        Anion Gap   16             Baso #   0.02             Basophil %   0.1             BUN   50             Calcium    7.3             Chloride   107             CO2   21             CPK   26             Creatinine   1.3             Differential Method   Automated             eGFR   39.8             Eos #   0.0             Eos %   0.0             Glucose   136             Gran # (ANC)   11.3             Gran %   84.4             Hematocrit   22.3             Hemoglobin   7.5             Immature Grans (Abs)   0.19  Comment: Mild elevation in immature granulocytes is non specific and   can be seen in a variety of conditions including stress response,   acute inflammation, trauma and pregnancy. Correlation with other   laboratory and clinical findings is essential.               Immature Granulocytes   1.4             Lymph #   1.6             Lymph %   11.9             Magnesium    1.8             MCH   32.8             MCHC   33.6             MCV   97             Mono #   0.3             Mono %   2.2             MPV   13.2             nRBC   0             Phosphorus Level   3.7             Platelet Count   147             POCT Glucose 212     159   159   93       Potassium   4.1             RBC   2.29             RDW   20.9             Sodium   144             WBC   13.39                                    Significant Imaging:     Imaging Results    None

## 2024-07-02 NOTE — ASSESSMENT & PLAN NOTE
88-year-old female with history of right TKA c/b distal femur periprosthetic fracture secondary to a fall s/p right knee revision of TKA with distal femoral replacement on 4/29 (completed empiric Linezolid > Daptomycin x 10 days through 5/11). Course complicated by right knee wound dehiscence s/p surgical I&D 5/16 followed by muscle flap/skin graft placement on 5/23. Cultures 5/16 grew Proteus and Pseudomonas. There were concerns for possible hardware involvement (Op note with dehiscence of inferior aspect of the capsular repair). Patient was discharged on six weeks of IV Zosyn from I&D (EOC 6/27). She followed up with ID on 6/6 and 6/19 and knee graft noted to be necrotic with slough present. Wound cultures 6/6 grew CONS and alpha hemolytic strep and tissue cultures 6/19 (from debridement) grew VRE and micrococcus. It is unclear if patient was started on a new antibiotic since this time. Case management unable to obtain medication list from nursing facility.    Patient presented with diffuse erythematous rash and tongue/facial swelling concerning for drug eruption and DRESS, which was confirmed on punch biopsy. She is improving with systemic steroids. In regards to her right knee, CT showed periarticular soft tissue/fluid about the femoral compartment of the arthroplasty. Orthopedic surgery has no plans for acute intervention.  declined transfer. Plan will be for patient to follow up with her established surgeon at  (Dr. Hernandez) as an outpatient. She  was started on Daptomycin for VRE and micrococcus. She is currently afebrile. WBC downtrending. HDS.     Recommendations  Continue IV Daptomycin to cover VRE, micrococcus from tissue cultures obtained 6/19 during tissue debridement. CPK 26.  Patient completed appropriate course of antibiotics for acute PJI with pseudomonas and proteus. Given retained hardware, ideally patient would transition to oral antibiotic suppression at this time. However, she is not a  flouroquinolone candidate (QTC >500). The only options are hardware removal/exchange or AKA.   Continue systemic steroids and management per Dermatology  Once stable for discharge, please ensure patient has follow up scheduled with her established ID physician (Dr. Reyna Garcia) within two weeks of discharge. Can transition to PO Linezolid for VRE/micrococcus until follow up has been scheduled.   Discussed plan with ID staff. ID will sign off.

## 2024-07-02 NOTE — NURSING
Wound care completed, patient voided  soaked incontinence pad also had 150 ml urine to cannister from pure wick. Call light in reach, bed in lowest position, and plan of care ongoing.

## 2024-07-02 NOTE — PROGRESS NOTES
[facility] ([place] [address])  [facility] Medicine  Progress Note    Patient Name: Dahlia Spence  MRN: [id number]  Patient Class: IP- Inpatient   Admission Date: 6/21/2024  Length of Stay: 10 days  Attending Physician: Akil Gandhi III*  Primary Care Provider: Frandy Roe MD        Subjective:     Principal Problem:DRESS syndrome        HPI:  88 YOF w/CKD3, T2DM, COPD, PVD 2/2 to DM, HTN, HLD, LBP, OA who presented for a skin rash. The patient had a recent I&D (05/16/2024) for her R knee wound and started zosyn. Then 7 days ago she developed full body hives with rash and tongue swelling. Family reports AMS and is not at her baseline.     Recent history: right distal femur replacement on 04/29/2024, then I&D of RLE 5/16/24, then dehiscence of the right TKA and underwent a right gastroc flap, split thickness skin graft, and wound VAC on 05/23/2024. Then the patient had a post op infection and had a 10 day course of linezolid to daptomycin. Then she had a second post op infection treated with a 6 week course of zosyn on  05/30/2024 and was seen by ID.     In the ED: patient received fluid resuscitation and IV ceftriaxone. She exhibited signs of minimal skin desqaumation amidst normal vital signs.  WBC 20.56  HGB 12.6     K 3.0  CRT 5.0 (baseline 1.5)    Overview/Hospital Course:  88 YOF w/CKD3, T2DM, COPD, PVD 2/2 to DM, HTN, HLD, LBP, OA who presented for a severe total body rash, AMS, and profound JOSÉ and is admitted for DRESS syndrome 2/2 to zosyn. The pt had a recent TKA, fall after OR and femur fracture, requiring ORIF followed by wound infection and complex healing course at  where she required ABX therapy guided by ID and started zosyn. Wound care, ID, ortho, derm, and nephro were consulted. For DRESS (confirmed on path) she is on IV and topical steroids, and it's improving. Per ortho the patient needs to follow up with his orthopedic surgeon at  and will only intervene if  the patient is unstable. ID recommends continuing to hold antibiotics in the setting of DRESS. Developed an JOSÉ this admission, which is likely 2/2 to prerenal, started on HCO3 gtt and transitioned to continuous LR, and now off IVF given improvement in JOSÉ and new upper extremity swelling. Started nightly seroquel for AMS 2/2 possibly to delirium from acute illness (neg CTH) which has been unchanged throughout admission. We obtained more knee imaging to better characterize prosthetic joint infection while EJ transfer situation is pending.  Worsening UE swelling bilaterally, obtained bilateral upper extremity US. CT lower limb, inflammatory/infectious process in the absence of osteomyelitis. Discussing with ID treatment options. UE US showed a superficial vein thrombus. Pt blood pressure is being controlled with Nifedipine. Pt passed her voiding trial and rueda was removed.     Interval History: Pt is more alert and comfortable.     Review of Systems  Objective:     Vital Signs (Most Recent):  Temp: 97.9 °F (36.6 °C) (07/02/24 1151)  Pulse: 76 (07/02/24 1151)  Resp: 18 (07/02/24 1151)  BP: (!) 162/88 (07/02/24 1151)  SpO2: 100 % (07/02/24 1151) Vital Signs (24h Range):  Temp:  [97.5 °F (36.4 °C)-98.7 °F (37.1 °C)] 97.9 °F (36.6 °C)  Pulse:  [67-92] 76  Resp:  [12-18] 18  SpO2:  [94 %-100 %] 100 %  BP: (140-173)/() 162/88     Weight: 83.9 kg (185 lb)  Body mass index is 33.84 kg/m².    Intake/Output Summary (Last 24 hours) at 7/2/2024 1428  Last data filed at 7/1/2024 2201  Gross per 24 hour   Intake 604.17 ml   Output 350 ml   Net 254.17 ml         Physical Exam  Vitals and nursing note reviewed.   Constitutional:       General: She is not in acute distress.     Appearance: Normal appearance.   Eyes:      Extraocular Movements: Extraocular movements intact.      Pupils: Pupils are equal, round, and reactive to light.   Cardiovascular:      Rate and Rhythm: Normal rate and regular rhythm.      Heart sounds:  Normal heart sounds. No murmur heard.     No gallop.   Pulmonary:      Breath sounds: Normal breath sounds. No stridor. No wheezing.   Abdominal:      General: Bowel sounds are normal.   Neurological:      Mental Status: She is alert and oriented to person, place, and time.   Psychiatric:         Mood and Affect: Mood normal.             Significant Labs: All pertinent labs within the past 24 hours have been reviewed.    Significant Imaging: I have reviewed all pertinent imaging results/findings within the past 24 hours.    Assessment/Plan:      * DRESS syndrome  Concern for DRESS clinically, started on IV and topical steroids. DRESS confirmed on pathologyID recommends to continue holding antibiotics in the setting of DRESS.     - Antihistamine, topical steroid  -Derm consulted, agree with DRESS, follow their recs.  - DC'd IV solemedrol, will follow derm regarding oral predinisone taper length            Eye abnormalities  Ophthal consulted, continue to  follow recs which are comprised of petroleum topical application        Weakness  PT/OT following     Acidosis  HAGMA 2/2 to pre-renal JOSÉ    - was on HCO3 gtt, transitioned to LR; holding IVF in the setting of bilateral UE swelling and Cr returning to BL   - CMP daily      Hypokalemia    Lab Results   Component Value Date    K 4.1 07/02/2024   . Will continue to monitor potassium levels and replace as needed    Metabolic acidosis, increased anion gap  See acidosis      JOSÉ (acute kidney injury)  Patient with acute kidney injury/acute renal failure likely due to pre-renal azotemia due to dehydration JOSÉ is currently improving. Baseline creatinine  1.5  - Labs reviewed- Renal function/electrolytes with Estimated Creatinine Clearance: 30.6 mL/min (based on SCr of 1.3 mg/dL). according to latest data. Monitor urine output and serial BMP and adjust therapy as needed. Avoid nephrotoxins and renally dose meds for GFR listed above.    Open wound of right knee s/p R distal  femoral replacement, I&D, and gastrocnemius flap  .Pt had a recent TKA, fall after OR and femur fracture, requiring ORIF followed by wound infection and complex healing course. Bone cx alpha hemolytic strep. Required ABX therapy guided by ID c/o Linezolid followed by recent initiation of Zosyn therapy. off zosyn.   CT lower limb: infections vs inflammatory process in the absence of evidence of osteomyelitis    Plan:  - Ortho consulted, [facility] ortho team would like patient transferred to  under primary surgeon. If pt is unable to be safely transferred then ortho at [facility] will address her knee  - rediscuss with ID treatment options  - Continue Daptomycin treatment   - waiting for records from ltac   - Attempts to transfer patient to [facility] at the same ortho and plastic surgery dept who operated on her in order to continue care for knee wound         Type 2 diabetes mellitus with other circulatory complications  Low dose ssi as needed prn  Trend glucose levels       Chronic obstructive pulmonary disease, unspecified  Supplemental oxygen as needed   Duo-nebs  Target SPO2 >88%    Chronic kidney disease, stage III (moderate)  JOSÉ on CKD 2/2 to pre-renal JOSÉ s/p HCO3 gtt and IVF    Plan:  - Nephrology consulted, Cr returning to      Benign essential hypertension  - Continue Nifedipine for BP control       VTE Risk Mitigation (From admission, onward)           Ordered     heparin (porcine) injection 5,000 Units  Every 8 hours         06/21/24 1803     IP VTE HIGH RISK PATIENT  Once         06/21/24 1803     Place sequential compression device  Until discontinued         06/21/24 1803                    Discharge Planning   ACE: 7/4/2024     Code Status: DNR   Is the patient medically ready for discharge?:     Reason for patient still in hospital (select all that apply): Patient trending condition  Discharge Plan A: Long-term acute care facility (LTAC)   Discharge Delays: None known at this time              Cat Schroeder  MD [facility]  Department of Hospital Medicine   [facility] ([place])

## 2024-07-02 NOTE — ASSESSMENT & PLAN NOTE
Lab Results   Component Value Date    K 4.1 07/02/2024   . Will continue to monitor potassium levels and replace as needed

## 2024-07-02 NOTE — PROGRESS NOTES
[facility] - [facility] ([place])  Infectious Disease  Progress Note    Patient Name: Dahlia Spence  MRN: [id number]  Admission Date: 6/21/2024  Length of Stay: 10 days  Attending Physician: Akil Gandhi III*  Primary Care Provider: Frandy Roe MD    Isolation Status: Contact  Assessment/Plan:      Oncology  * DRESS syndrome    88-year-old female with history of right TKA c/b distal femur periprosthetic fracture secondary to a fall s/p right knee revision of TKA with distal femoral replacement on 4/29 (completed empiric Linezolid > Daptomycin x 10 days through 5/11). Course complicated by right knee wound dehiscence s/p surgical I&D 5/16 followed by muscle flap/skin graft placement on 5/23. Cultures 5/16 grew Proteus and Pseudomonas. There were concerns for possible hardware involvement (Op note with dehiscence of inferior aspect of the capsular repair). Patient was discharged on six weeks of IV Zosyn from I&D (EOC 6/27). She followed up with ID on 6/6 and 6/19 and knee graft noted to be necrotic with slough present. Wound cultures 6/6 grew CONS and alpha hemolytic strep and tissue cultures 6/19 (from debridement) grew VRE and micrococcus. It is unclear if patient was started on a new antibiotic since this time. Case management unable to obtain medication list from nursing facility.    Patient presented with diffuse erythematous rash and tongue/facial swelling concerning for drug eruption and DRESS, which was confirmed on punch biopsy. She is improving with systemic steroids. In regards to her right knee, CT showed periarticular soft tissue/fluid about the femoral compartment of the arthroplasty. Orthopedic surgery has no plans for acute intervention.  declined transfer. Plan will be for patient to follow up with her established surgeon at  (Dr. Hernandez) as an outpatient. She  was started on Daptomycin for VRE and micrococcus. She is currently afebrile. WBC downtrending. HDS.      Recommendations  Continue IV Daptomycin to cover VRE, micrococcus from tissue cultures obtained 6/19 during tissue debridement. CPK 26.  Patient completed appropriate course of antibiotics for acute PJI with pseudomonas and proteus. Given retained hardware, ideally patient would transition to oral antibiotic suppression at this time. However, she is not a flouroquinolone candidate (QTC >500). The only options are hardware removal/exchange or AKA.   Continue systemic steroids and management per Dermatology  Once stable for discharge, please ensure patient has follow up scheduled with her established ID physician (Dr. Reyna Garcia) within two weeks of discharge. Can transition to PO Linezolid for VRE/micrococcus until follow up has been scheduled. Please ensure follow up is scheduled with her orthopedic surgeon.  Discussed plan with ID staff. ID will sign off. Feel free to call or re-consult ID as needed.        Thank you for the consult. Please secure chat for any questions.  Vanessa Pickering PA-C     Subjective:     Principal Problem:DRESS syndrome    HPI: Dahlia Spence is an 88-year-old female with past medical history of hypertension, hyperlipidemia, osteoarthritis, DM, PVD who presented to [facility] ED 6/21 for a diffuse skin rash.     Patient has a history of right TKA 4/22/2024. She was working with physical therapy postoperatively and sustained a distal femur periprosthetic fracture of the right knee secondary to a fall. Due to proximity with knee arthroplasty, she underwent a right knee revision of TKA with distal femoral replacement on 4/29/2024. Due to increased risk of post op infection, ID was consulted and recommended 10 days of empiric antibiotics. She was initially on Linezolid (d/c due to thrombocytopenia) and completed course with Daptomycin (EOC 5/11). Patient was discharged to a nursing facility though represented 05/15/2024 with wound dehiscence. On 5/16 she underwent surgical I&D followed by  muscle flap and skin graft placement on 5/23. Cultures 5/16 grew pan susceptible Proteus and Pseudomonas. There were concerns for possible hardware involvement (Op note with dehiscence of inferior aspect of the capsular repair). ID recommended six weeks of IV Zosyn from I&D (EOC 6/27).    Patient followed up with ID on 6/6. Her wound had some evidence of slough and necrosis and she underwent light debridement. Wound cultures  grew CONS and alpha hemalytic strep. Patient's daugher is at bedside and reports she was suppose to start a new oral antibiotic but doesn't believe the patient started in. Over the last week the patient has developed a full body rash and AMS. She was brought to the ED after complaining of tongue swelling.     In the ED: afebrile and VSS. WBC 20K, eosinophilia noted,K 3, Cr 5, , ESR 61. She was given IVF, Cefepime, antihistamine, steroids.  ID consulted for recommendations.  Interval History:   Afebrile. Rash continues to improve. Dapto started yesterday as cultures from knee obtained 6/19 (tissue debridement) are positive for VRE and micrococcus. Per primary team, planning to discharge patient soon.    Review of Systems   Constitutional:  Negative for diaphoresis and fever.   Gastrointestinal:  Negative for vomiting.   Musculoskeletal:  Positive for arthralgias.   Skin:  Positive for color change, rash and wound.   Psychiatric/Behavioral:  Negative for agitation. The patient is not nervous/anxious.      Objective:     Vital Signs (Most Recent):  Temp: 97.9 °F (36.6 °C) (07/02/24 1151)  Pulse: 76 (07/02/24 1151)  Resp: 18 (07/02/24 1151)  BP: (!) 162/88 (07/02/24 1151)  SpO2: 100 % (07/02/24 1151) Vital Signs (24h Range):  Temp:  [97.5 °F (36.4 °C)-98.7 °F (37.1 °C)] 97.9 °F (36.6 °C)  Pulse:  [67-92] 76  Resp:  [12-18] 18  SpO2:  [94 %-100 %] 100 %  BP: (140-173)/() 162/88     Weight: 83.9 kg (185 lb)  Body mass index is 33.84 kg/m².    Estimated Creatinine Clearance: 30.6 mL/min  (based on SCr of 1.3 mg/dL).     Physical Exam  Vitals and nursing note reviewed.   Constitutional:       General: She is not in acute distress.     Appearance: She is well-developed. She is obese. She is ill-appearing.   HENT:      Head: Normocephalic and atraumatic.      Right Ear: External ear normal.      Left Ear: External ear normal.      Nose: Nose normal.   Eyes:      General: No scleral icterus.        Right eye: No discharge.         Left eye: No discharge.      Extraocular Movements: Extraocular movements intact.      Conjunctiva/sclera: Conjunctivae normal.   Cardiovascular:      Rate and Rhythm: Normal rate and regular rhythm.      Heart sounds: Normal heart sounds. No murmur heard.  Pulmonary:      Effort: Pulmonary effort is normal. No respiratory distress.      Breath sounds: Normal breath sounds. No stridor. No wheezing.   Abdominal:      General: Abdomen is flat. There is no distension.      Palpations: Abdomen is soft.      Tenderness: There is no abdominal tenderness.   Musculoskeletal:         General: Swelling and tenderness present.      Cervical back: Normal range of motion.   Skin:     General: Skin is warm and dry.      Findings: Bruising (LUE), erythema and rash present.      Comments: Diffuse erythematous rash with skin peeling/desquamation improving  Large right knee skin defect with fibrinous tissue present. Sutures still in place   Neurological:      Mental Status: She is alert.   Psychiatric:         Mood and Affect: Mood normal.         Behavior: Behavior normal.       7/2/24 7/1/24 6/24/24                                  Previous:                                                              Significant Labs: CBC:   Recent Labs   Lab 07/01/24  0754 07/02/24  0757   WBC 18.20* 13.39*   HGB 7.8* 7.5*   HCT 23.5* 22.3*    147*     CMP:   Recent Labs   Lab 07/01/24  0655 07/02/24  0757    144   K 4.2 4.1    107   CO2 20* 21*   GLU 66*  136*   BUN 53* 50*   CREATININE 1.5* 1.3   CALCIUM 7.2* 7.3*   ANIONGAP 14 16     Microbiology Results (last 7 days)       Procedure Component Value Units Date/Time    Blood culture #1 **CANNOT BE ORDERED STAT** [[id number]] Collected: 06/21/24 1443    Order Status: Completed Specimen: Blood from Peripheral, Antecubital, Right Updated: 06/26/24 1812     Blood Culture, Routine No growth after 5 days.    Blood culture #2 **CANNOT BE ORDERED STAT** [[id number]] Collected: 06/21/24 1443    Order Status: Completed Specimen: Blood from Peripheral, Antecubital, Right Updated: 06/26/24 1812     Blood Culture, Routine No growth after 5 days.          Recent Lab Results  (Last 5 results in the past 24 hours)        07/02/24  1150   07/02/24  0757   07/02/24  0718   07/01/24  2150   07/01/24  1813        Anion Gap   16             Baso #   0.02             Basophil %   0.1             BUN   50             Calcium   7.3             Chloride   107             CO2   21             CPK   26             Creatinine   1.3             Differential Method   Automated             eGFR   39.8             Eos #   0.0             Eos %   0.0             Glucose   136             Gran # (ANC)   11.3             Gran %   84.4             Hematocrit   22.3             Hemoglobin   7.5             Immature Grans (Abs)   0.19  Comment: Mild elevation in immature granulocytes is non specific and   can be seen in a variety of conditions including stress response,   acute inflammation, trauma and pregnancy. Correlation with other   laboratory and clinical findings is essential.               Immature Granulocytes   1.4             Lymph #   1.6             Lymph %   11.9             Magnesium    1.8             MCH   32.8             MCHC   33.6             MCV   97             Mono #   0.3             Mono %   2.2             MPV   13.2             nRBC   0             Phosphorus Level   3.7             Platelet Count   147             POCT  Glucose 212     159   159   93       Potassium   4.1             RBC   2.29             RDW   20.9             Sodium   144             WBC   13.39                                    Significant Imaging:     Imaging Results    None

## 2024-07-02 NOTE — ASSESSMENT & PLAN NOTE
.Pt had a recent TKA, fall after OR and femur fracture, requiring ORIF followed by wound infection and complex healing course. Bone cx alpha hemolytic strep. Required ABX therapy guided by ID c/o Linezolid followed by recent initiation of Zosyn therapy. off zosyn.   CT lower limb: infections vs inflammatory process in the absence of evidence of osteomyelitis    Plan:  - Ortho consulted, [facility] ortho team would like patient transferred to  under primary surgeon. If pt is unable to be safely transferred then ortho at [facility] will address her knee  - rediscuss with ID treatment options  - Continue Daptomycin treatment   - waiting for records from [facility]   - Attempts to transfer patient to [facility] at the same ortho and plastic surgery dept who operated on her in order to continue care for knee wound

## 2024-07-02 NOTE — PLAN OF CARE
Problem: Adult Inpatient Plan of Care  Goal: Plan of Care Review  Outcome: Progressing  Goal: Patient-Specific Goal (Individualized)  Outcome: Progressing  Goal: Absence of Hospital-Acquired Illness or Injury  Outcome: Progressing  Goal: Optimal Comfort and Wellbeing  Outcome: Progressing  Goal: Readiness for Transition of Care  Outcome: Progressing     Problem: Diabetes Comorbidity  Goal: Blood Glucose Level Within Targeted Range  Outcome: Progressing     Problem: Wound  Goal: Optimal Coping  Outcome: Progressing  Goal: Optimal Functional Ability  Outcome: Progressing  Goal: Absence of Infection Signs and Symptoms  Outcome: Progressing  Goal: Improved Oral Intake  Outcome: Progressing  Goal: Optimal Pain Control and Function  Outcome: Progressing  Goal: Skin Health and Integrity  Outcome: Progressing  Goal: Optimal Wound Healing  Outcome: Progressing     Problem: Skin Injury Risk Increased  Goal: Skin Health and Integrity  Outcome: Progressing     Problem: Fall Injury Risk  Goal: Absence of Fall and Fall-Related Injury  Outcome: Progressing     Problem: Acute Kidney Injury/Impairment  Goal: Fluid and Electrolyte Balance  Outcome: Progressing  Goal: Improved Oral Intake  Outcome: Progressing  Goal: Effective Renal Function  Outcome: Progressing     Problem: Infection  Goal: Absence of Infection Signs and Symptoms  Outcome: Progressing     Problem: Mobility Impairment  Goal: Optimal Mobility  Outcome: Progressing

## 2024-07-02 NOTE — ASSESSMENT & PLAN NOTE
Patient with acute kidney injury/acute renal failure likely due to pre-renal azotemia due to dehydration JOSÉ is currently improving. Baseline creatinine  1.5  - Labs reviewed- Renal function/electrolytes with Estimated Creatinine Clearance: 30.6 mL/min (based on SCr of 1.3 mg/dL). according to latest data. Monitor urine output and serial BMP and adjust therapy as needed. Avoid nephrotoxins and renally dose meds for GFR listed above.

## 2024-07-02 NOTE — CARE UPDATE
I have reviewed the chart of Dahlia Spence and collaborated with Akil Gandhi III* in the care of the patient who is hospitalized for the following:    Active Hospital Problems    Diagnosis    *DRESS syndrome    Acute blood loss anemia    Thrombocytopenia    Chronic diastolic heart failure    Eye abnormalities    Weakness    Hypokalemia    Acidosis    Open wound of right knee s/p R distal femoral replacement, I&D, and gastrocnemius flap    JOSÉ (acute kidney injury)    Metabolic acidosis, increased anion gap    Chronic obstructive pulmonary disease, unspecified    Type 2 diabetes mellitus with other circulatory complications    Benign essential hypertension    Chronic kidney disease, stage III (moderate)          I have reviewed Dahlia Spence with the multidisciplinary team during discharge huddle.       Margarita Stephens PA-C  [facility]

## 2024-07-03 PROBLEM — D69.6 THROMBOCYTOPENIA: Status: RESOLVED | Noted: 2024-07-02 | Resolved: 2024-07-03

## 2024-07-03 PROBLEM — E87.29 METABOLIC ACIDOSIS, INCREASED ANION GAP: Status: RESOLVED | Noted: 2024-06-22 | Resolved: 2024-07-03

## 2024-07-03 PROBLEM — D64.9 ANEMIA: Status: ACTIVE | Noted: 2024-07-02

## 2024-07-03 PROBLEM — E87.20 ACIDOSIS: Status: RESOLVED | Noted: 2024-06-23 | Resolved: 2024-07-03

## 2024-07-03 LAB
ANION GAP SERPL CALC-SCNC: 10 MMOL/L (ref 8–16)
BASOPHILS # BLD AUTO: 0.02 K/UL (ref 0–0.2)
BASOPHILS NFR BLD: 0.2 % (ref 0–1.9)
BUN SERPL-MCNC: 46 MG/DL (ref 8–23)
CALCIUM SERPL-MCNC: 7.4 MG/DL (ref 8.7–10.5)
CHLORIDE SERPL-SCNC: 107 MMOL/L (ref 95–110)
CO2 SERPL-SCNC: 25 MMOL/L (ref 23–29)
CREAT SERPL-MCNC: 1.5 MG/DL (ref 0.5–1.4)
DIFFERENTIAL METHOD BLD: ABNORMAL
EOSINOPHIL # BLD AUTO: 0 K/UL (ref 0–0.5)
EOSINOPHIL NFR BLD: 0 % (ref 0–8)
ERYTHROCYTE [DISTWIDTH] IN BLOOD BY AUTOMATED COUNT: 20.9 % (ref 11.5–14.5)
EST. GFR  (NO RACE VARIABLE): 33.5 ML/MIN/1.73 M^2
GLUCOSE SERPL-MCNC: 144 MG/DL (ref 70–110)
HCT VFR BLD AUTO: 23.8 % (ref 37–48.5)
HGB BLD-MCNC: 7.5 G/DL (ref 12–16)
HHV6 IGG TITR SER IF: ABNORMAL {TITER}
HHV6 IGG+IGM SER-IMP: ABNORMAL
HHV6 IGM TITR SER IF: ABNORMAL {TITER}
IMM GRANULOCYTES # BLD AUTO: 0.24 K/UL (ref 0–0.04)
IMM GRANULOCYTES NFR BLD AUTO: 1.9 % (ref 0–0.5)
LYMPHOCYTES # BLD AUTO: 1.5 K/UL (ref 1–4.8)
LYMPHOCYTES NFR BLD: 11.8 % (ref 18–48)
MAGNESIUM SERPL-MCNC: 1.6 MG/DL (ref 1.6–2.6)
MCH RBC QN AUTO: 31.6 PG (ref 27–31)
MCHC RBC AUTO-ENTMCNC: 31.5 G/DL (ref 32–36)
MCV RBC AUTO: 100 FL (ref 82–98)
MONOCYTES # BLD AUTO: 0.4 K/UL (ref 0.3–1)
MONOCYTES NFR BLD: 3.4 % (ref 4–15)
NEUTROPHILS # BLD AUTO: 10.3 K/UL (ref 1.8–7.7)
NEUTROPHILS NFR BLD: 82.7 % (ref 38–73)
NRBC BLD-RTO: 0 /100 WBC
PHOSPHATE SERPL-MCNC: 3 MG/DL (ref 2.7–4.5)
PLATELET # BLD AUTO: 156 K/UL (ref 150–450)
PMV BLD AUTO: 12.7 FL (ref 9.2–12.9)
POCT GLUCOSE: 163 MG/DL (ref 70–110)
POCT GLUCOSE: 170 MG/DL (ref 70–110)
POTASSIUM SERPL-SCNC: 3.4 MMOL/L (ref 3.5–5.1)
RBC # BLD AUTO: 2.37 M/UL (ref 4–5.4)
SODIUM SERPL-SCNC: 142 MMOL/L (ref 136–145)
WBC # BLD AUTO: 12.46 K/UL (ref 3.9–12.7)

## 2024-07-03 PROCEDURE — 25000003 PHARM REV CODE 250

## 2024-07-03 PROCEDURE — 25000003 PHARM REV CODE 250: Performed by: PHYSICIAN ASSISTANT

## 2024-07-03 PROCEDURE — 63600175 PHARM REV CODE 636 W HCPCS

## 2024-07-03 PROCEDURE — 84100 ASSAY OF PHOSPHORUS: CPT

## 2024-07-03 PROCEDURE — 36415 COLL VENOUS BLD VENIPUNCTURE: CPT

## 2024-07-03 PROCEDURE — 20600001 HC STEP DOWN PRIVATE ROOM

## 2024-07-03 PROCEDURE — A4216 STERILE WATER/SALINE, 10 ML: HCPCS | Performed by: INTERNAL MEDICINE

## 2024-07-03 PROCEDURE — 97110 THERAPEUTIC EXERCISES: CPT | Mod: CQ

## 2024-07-03 PROCEDURE — 85025 COMPLETE CBC W/AUTO DIFF WBC: CPT

## 2024-07-03 PROCEDURE — 63600175 PHARM REV CODE 636 W HCPCS: Performed by: PHYSICIAN ASSISTANT

## 2024-07-03 PROCEDURE — 97530 THERAPEUTIC ACTIVITIES: CPT | Mod: CQ

## 2024-07-03 PROCEDURE — 27000207 HC ISOLATION

## 2024-07-03 PROCEDURE — 80048 BASIC METABOLIC PNL TOTAL CA: CPT | Performed by: FAMILY MEDICINE

## 2024-07-03 PROCEDURE — 25000003 PHARM REV CODE 250: Performed by: INTERNAL MEDICINE

## 2024-07-03 PROCEDURE — 83735 ASSAY OF MAGNESIUM: CPT

## 2024-07-03 PROCEDURE — 97535 SELF CARE MNGMENT TRAINING: CPT | Mod: CO

## 2024-07-03 PROCEDURE — 97530 THERAPEUTIC ACTIVITIES: CPT | Mod: CO

## 2024-07-03 RX ORDER — CETIRIZINE HYDROCHLORIDE 5 MG/1
5 TABLET ORAL DAILY
Start: 2024-07-04 | End: 2025-07-04

## 2024-07-03 RX ORDER — PREDNISONE 10 MG/1
TABLET ORAL
Qty: 147 TABLET | Refills: 0 | Status: SHIPPED | OUTPATIENT
Start: 2024-07-04 | End: 2024-08-08

## 2024-07-03 RX ORDER — FAMOTIDINE 20 MG/1
20 TABLET, FILM COATED ORAL DAILY
Start: 2024-07-03 | End: 2025-07-03

## 2024-07-03 RX ORDER — LINEZOLID 600 MG/1
600 TABLET, FILM COATED ORAL EVERY 12 HOURS
Qty: 56 TABLET | Refills: 0 | Status: SHIPPED | OUTPATIENT
Start: 2024-07-03 | End: 2024-07-31

## 2024-07-03 RX ORDER — FAMOTIDINE 20 MG/1
20 TABLET, FILM COATED ORAL DAILY
Status: DISCONTINUED | OUTPATIENT
Start: 2024-07-03 | End: 2024-07-04 | Stop reason: HOSPADM

## 2024-07-03 RX ORDER — CALCIUM CARBONATE 200(500)MG
500 TABLET,CHEWABLE ORAL DAILY
Start: 2024-07-04 | End: 2025-07-04

## 2024-07-03 RX ORDER — LISINOPRIL AND HYDROCHLOROTHIAZIDE 12.5; 2 MG/1; MG/1
1 TABLET ORAL DAILY
Start: 2024-07-03

## 2024-07-03 RX ORDER — NIFEDIPINE 30 MG/1
30 TABLET, EXTENDED RELEASE ORAL DAILY
Qty: 30 TABLET | Refills: 11 | Status: SHIPPED | OUTPATIENT
Start: 2024-07-04 | End: 2025-07-04

## 2024-07-03 RX ORDER — ATOVAQUONE 750 MG/5ML
1500 SUSPENSION ORAL DAILY
Start: 2024-07-03

## 2024-07-03 RX ORDER — TRIAMCINOLONE ACETONIDE 1 MG/G
CREAM TOPICAL 2 TIMES DAILY
Start: 2024-07-03

## 2024-07-03 RX ORDER — ALLOPURINOL 100 MG/1
200 TABLET ORAL DAILY
Qty: 180 TABLET | Refills: 3 | Status: SHIPPED | OUTPATIENT
Start: 2024-07-03

## 2024-07-03 RX ORDER — ERGOCALCIFEROL 1.25 MG/1
50000 CAPSULE ORAL
Start: 2024-07-10

## 2024-07-03 RX ORDER — RISEDRONATE SODIUM 35 MG/1
35 TABLET, FILM COATED ORAL
Start: 2024-07-03

## 2024-07-03 RX ORDER — ATOVAQUONE 750 MG/5ML
1500 SUSPENSION ORAL DAILY
Status: DISCONTINUED | OUTPATIENT
Start: 2024-07-03 | End: 2024-07-04 | Stop reason: HOSPADM

## 2024-07-03 RX ADMIN — CETIRIZINE HYDROCHLORIDE 5 MG: 5 TABLET, FILM COATED ORAL at 09:07

## 2024-07-03 RX ADMIN — NIFEDIPINE 30 MG: 30 TABLET, FILM COATED, EXTENDED RELEASE ORAL at 09:07

## 2024-07-03 RX ADMIN — Medication 10 ML: at 12:07

## 2024-07-03 RX ADMIN — HEPARIN SODIUM 5000 UNITS: 5000 INJECTION INTRAVENOUS; SUBCUTANEOUS at 02:07

## 2024-07-03 RX ADMIN — ATORVASTATIN CALCIUM 10 MG: 10 TABLET, FILM COATED ORAL at 09:07

## 2024-07-03 RX ADMIN — HEPARIN SODIUM 5000 UNITS: 5000 INJECTION INTRAVENOUS; SUBCUTANEOUS at 06:07

## 2024-07-03 RX ADMIN — POTASSIUM BICARBONATE 40 MEQ: 391 TABLET, EFFERVESCENT ORAL at 10:07

## 2024-07-03 RX ADMIN — Medication 10 ML: at 05:07

## 2024-07-03 RX ADMIN — ATOVAQUONE 1500 MG: 750 SUSPENSION ORAL at 11:07

## 2024-07-03 RX ADMIN — MUPIROCIN: 20 OINTMENT TOPICAL at 09:07

## 2024-07-03 RX ADMIN — ERGOCALCIFEROL 50000 UNITS: 1.25 CAPSULE ORAL at 09:07

## 2024-07-03 RX ADMIN — MINERAL OIL AND WHITE PETROLATUM: 30; 940 OINTMENT OPHTHALMIC at 09:07

## 2024-07-03 RX ADMIN — WHITE PETROLATUM: 1.75 OINTMENT TOPICAL at 09:07

## 2024-07-03 RX ADMIN — DAPTOMYCIN 670 MG: 500 INJECTION, POWDER, LYOPHILIZED, FOR SOLUTION INTRAVENOUS at 04:07

## 2024-07-03 RX ADMIN — TRIAMCINOLONE ACETONIDE: 1 CREAM TOPICAL at 09:07

## 2024-07-03 RX ADMIN — FAMOTIDINE 20 MG: 20 TABLET ORAL at 11:07

## 2024-07-03 RX ADMIN — PREDNISONE 80 MG: 20 TABLET ORAL at 09:07

## 2024-07-03 RX ADMIN — CALCIUM CARBONATE (ANTACID) CHEW TAB 500 MG 500 MG: 500 CHEW TAB at 09:07

## 2024-07-03 RX ADMIN — Medication 10 ML: at 06:07

## 2024-07-03 NOTE — PLAN OF CARE
Ambulance ETA 7:05, patient's daughter notified.          Starr Lester W (On Call)  Case Management  [phone]

## 2024-07-03 NOTE — DISCHARGE SUMMARY
[facility] ([place] [id number])  [facility] Medicine  Discharge Summary      Patient Name: Dahlia Spence  MRN: [id number]  YOSELIN: [id number]  Patient Class: IP- Inpatient  Admission Date: 6/21/2024  Hospital Length of Stay: 11 days  Discharge Date and Time:  07/03/2024 4:13 PM  Attending Physician: Akil Gandhi III*   Discharging Provider: Damaris Kohli DO  Primary Care Provider: Frandy Roe MD  [facility] Medicine Team: [facility]OSP MED 2 Damaris Kohli DO  Primary Care Team: [facility] HOSP MED 2    HPI:   88 YOF w/CKD3, T2DM, COPD, PVD 2/2 to DM, HTN, HLD, LBP, OA who presented for a skin rash. The patient had a recent I&D (05/16/2024) for her R knee wound and started zosyn. Then 7 days ago she developed full body hives with rash and tongue swelling. Family reports AMS and is not at her baseline.     Recent history: right distal femur replacement on 04/29/2024, then I&D of RLE 5/16/24, then dehiscence of the right TKA and underwent a right gastroc flap, split thickness skin graft, and wound VAC on 05/23/2024. Then the patient had a post op infection and had a 10 day course of linezolid to daptomycin. Then she had a second post op infection treated with a 6 week course of zosyn on  05/30/2024 and was seen by ID.     In the ED: patient received fluid resuscitation and IV ceftriaxone. She exhibited signs of minimal skin desqaumation amidst normal vital signs.  WBC 20.56  HGB 12.6     K 3.0  CRT 5.0 (baseline 1.5)    * No surgery found *      Hospital Course:   Pt admitted to  for management of DRESS syndrome as manifested by severe total body rash, AMS, and profound JOSÉ (Cr 5.0 on baseline of 1.3-1.6) due to Zosyn use. She had been utilizing Zosyn for treatment of a LE wound - she had a recent right TKA complicated by a femur fracture due to fall requiring ORIF which was further complicated by infection and complex wound healing. Skin biopsy on arrival confirmed diagnosis of DRESS. Treated with  IV and topical steroids per derm, noted to have significant improvement. JOSÉ resolved. Transitioned to long oral steroid taper with plans to follow up with dermatology outpatient. Discussed alternatives to Zosyn with ID, who were concerned for prosthetic joint infection. Not a candidate for fluoroquinolone therapy given long QT, therefore would need surgical source control. 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 Discussed with [facility] ortho team, who only recommended intervention if patient became unstable, otherwise recommended continued follow up with her primary surgeon at . Discussed with her plastic surgeon, Dr. Wang, who suggested that plan with her ortho team (Dr. Hernandez) was for right AKA. Unable to transfer to  given that Dr. Hernandez is not on service, therefore plan to discharge to SNF with continued outpatient follow up.     Pt is stable for discharge to SNF. Discharging with course of po linezolid for continued management of VRE wound infection and close outpatient ID follow up. She will also need continued follow up with ortho. Also discharging with long steroid taper course along with PJP ppx, steroid cream, and lubricating eye drops. She has a follow up appointment with derm scheduled 7/8.     Goals of Care Treatment Preferences:  Code Status: DNR      Consults:   Consults (From admission, onward)          Status Ordering Provider     Inpatient consult to Midline team  Once        Provider:  (Not yet assigned)    Completed JERAD NEWBY III     Inpatient consult to Ophthalmology  Once        Provider:  (Not yet assigned)    Completed TINY ROBISON     Inpatient consult to Midline team  Once        Provider:  (Not yet assigned)    Completed FLORIDA REED     Inpatient consult to Midline team  Once        Provider:  (Not yet assigned)    Completed FLORIDA REED     Inpatient consult to Nephrology   Once        Provider:  (Not yet assigned)    Completed NEHEMIAS BOWERS     Inpatient consult to Orthopedic Surgery  Once        Provider:  (Not yet assigned)    Completed TINY ROBISON     Inpatient consult to Infectious Diseases  Once        Provider:  (Not yet assigned)    Completed TINY ROBISON     Inpatient consult to Midline team  Once        Provider:  (Not yet assigned)    Completed FLORIDA REED     Inpatient consult to Dermatology  Once        Provider:  (Not yet assigned)    Completed NEHEMIAS BOWERS            No new Assessment & Plan notes have been filed under this hospital service since the last note was generated.  Service: Hospital Medicine    Final Active Diagnoses:    Diagnosis Date Noted POA    PRINCIPAL PROBLEM:  DRESS syndrome [D72.12, T50.905A] 06/21/2024 Yes    Anemia [D64.9] 07/02/2024 No    Chronic diastolic heart failure [I50.32] 07/02/2024 No    Eye abnormalities [Q15.9] 06/26/2024 Not Applicable    Weakness [R53.1] 06/25/2024 Yes    Hypokalemia [E87.6] 06/23/2024 Yes    Open wound of right knee s/p R distal femoral replacement, I&D, and gastrocnemius flap [S81.001A] 06/22/2024 Yes    JOSÉ (acute kidney injury) [N17.9] 06/22/2024 Yes    Chronic obstructive pulmonary disease, unspecified [J44.9] 08/15/2019 Yes    Type 2 diabetes mellitus with other circulatory complications [E11.59] 08/31/2015 Yes    Benign essential hypertension [I10] 09/17/2013 Yes    Chronic kidney disease, stage III (moderate) [N18.30] 08/14/2013 Yes      Problems Resolved During this Admission:    Diagnosis Date Noted Date Resolved POA    Thrombocytopenia [D69.6] 07/02/2024 07/03/2024 No    Acidosis [E87.20] 06/23/2024 07/03/2024 Yes    Metabolic acidosis, increased anion gap [E87.29] 06/22/2024 07/03/2024 Yes       Discharged Condition: stable    Disposition: Skilled Nursing Facility    Follow Up:    Patient Instructions:   No discharge procedures on file.    Significant Diagnostic Studies: N/A    Pending  Diagnostic Studies:       Procedure Component Value Units Date/Time    Herpesvirus-6 Human IgG and IgM Antibodies [[id number]] Collected: 06/27/24 0022    Order Status: Sent Lab Status: In process Updated: 06/27/24 0027    Specimen: Blood            Medications:  Reconciled Home Medications:      Medication List        START taking these medications      atovaquone 750 mg/5 mL Susp oral liquid  Commonly known as: MEPRON  Take 10 mLs (1,500 mg total) by mouth once daily.     calcium carbonate 200 mg calcium (500 mg) chewable tablet  Commonly known as: TUMS  Take 1 tablet (500 mg total) by mouth once daily.  Start taking on: July 4, 2024     cetirizine 5 MG tablet  Commonly known as: ZYRTEC  Take 1 tablet (5 mg total) by mouth once daily.  Start taking on: July 4, 2024     ergocalciferol 50,000 unit Cap  Commonly known as: ERGOCALCIFEROL  Take 1 capsule (50,000 Units total) by mouth every 7 days.  Start taking on: July 10, 2024     linezolid 600 mg Tab  Commonly known as: ZYVOX  Take 1 tablet (600 mg total) by mouth every 12 (twelve) hours.     NIFEdipine 30 MG (OSM) 24 hr tablet  Commonly known as: PROCARDIA-XL  Take 1 tablet (30 mg total) by mouth once daily.  Start taking on: July 4, 2024     predniSONE 10 MG tablet  Commonly known as: DELTASONE  Take 8 tablets (80 mg total) by mouth once daily for 7 days, THEN 6 tablets (60 mg total) once daily for 7 days, THEN 4 tablets (40 mg total) once daily for 7 days, THEN 2 tablets (20 mg total) once daily for 7 days, THEN 1 tablet (10 mg total) once daily for 7 days.  Start taking on: July 4, 2024     triamcinolone acetonide 0.1% 0.1 % cream  Commonly known as: KENALOG  Apply topically 2 (two) times daily. Wet wrap therapy:  - Apply thin layer of steroid cream to all affected areas on body.   - Cover with warm, damp cotton Pjs (or wrap with warm, damp towels) first then second layer of dry cotton Pjs (or wrap with large dry towel or blanket).  - Remove after 30 minutes to  an hour.   - Re-apply thin layer of steroid cream to all affected areas on body.     white petrolatum 41 % Oint  Apply topically as needed. Apply to mouth     white petrolatum-mineral oiL 94-3 % Oint  Place into both eyes 2 (two) times a day.            CHANGE how you take these medications      allopurinoL 100 MG tablet  Commonly known as: ZYLOPRIM  Take 2 tablets (200 mg total) by mouth once daily. HOLD until follow up with PCP  What changed:   when to take this  additional instructions     famotidine 20 MG tablet  Commonly known as: PEPCID  Take 1 tablet (20 mg total) by mouth once daily.  What changed:   when to take this  reasons to take this     lisinopriL-hydrochlorothiazide 20-12.5 mg per tablet  Commonly known as: PRINZIDE,ZESTORETIC  Take 1 tablet by mouth once daily. HOLD until follow up with PCP  What changed:   when to take this  additional instructions     risedronate 35 MG tablet  Commonly known as: ACTONEL  Take 1 tablet (35 mg total) by mouth every 7 days. HOLD until follow up with PCP  What changed: additional instructions            CONTINUE taking these medications      acetaminophen 325 MG tablet  Commonly known as: TYLENOL  Take 650 mg by mouth every 6 (six) hours as needed for Pain.     albuterol-ipratropium 2.5 mg-0.5 mg/3 mL nebulizer solution  Commonly known as: DUO-NEB  Take 3 mLs by nebulization every 6 (six) hours as needed for Wheezing. Rescue     aspirin 81 MG EC tablet  Commonly known as: ECOTRIN  Take 81 mg by mouth once daily.     benzonatate 200 MG capsule  Commonly known as: TESSALON  Take 200 mg by mouth 3 (three) times daily as needed for Cough.     ferrous sulfate Tab tablet  Commonly known as: FEOSOL  Take 1 tablet by mouth once daily.     HIGH POTENCY MULTIVITAMIN 400 mcg Tab  Generic drug: multivitamin with folic acid  Take 1 tablet by mouth once daily.     Saccharomyces boulardii 250 mg capsule  Commonly known as: FLORASTOR  Take 250 mg by mouth once daily.     simvastatin  20 MG tablet  Commonly known as: ZOCOR  TAKE 1 TABLET BY MOUTH EVERY EVENING     VITAMIN C 500 MG tablet  Generic drug: ascorbic acid (vitamin C)  Take 500 mg by mouth 2 (two) times daily.            STOP taking these medications      amLODIPine 5 MG tablet  Commonly known as: NORVASC     oxyCODONE 5 mg Cap  Commonly known as: OXY-IR              Indwelling Lines/Drains at time of discharge:   Lines/Drains/Airways       None                   Time spent on the discharge of patient: 45 minutes         Damaris Kohli DO  Department of Hospital Medicine  [facility] ([place]

## 2024-07-03 NOTE — PLAN OF CARE
[facility] ([place]  Discharge Reassessment    Primary Care Provider: Frandy Roe MD    Expected Discharge Date: 7/3/2024    Reassessment (most recent)       Discharge Reassessment - 07/03/24 1530          Discharge Reassessment    Assessment Type Discharge Planning Reassessment     Did the patient's condition or plan change since previous assessment? No     Discharge Plan discussed with: Adult children     Communicated ACE with patient/caregiver Yes     Discharge Plan A Skilled Nursing Facility     Discharge Plan B Home with family     DME Needed Upon Discharge  none     Transition of Care Barriers None     Why the patient remains in the hospital Requires continued medical care        Post-Acute Status    Post-Acute Authorization Placement     Coverage N     Hospital Resources/Appts/Education Provided Appointments scheduled and added to AVS     Discharge Delays None known at this time                       Discharge Plan A and Plan B have been determined by review of patient's clinical status, future medical and therapeutic needs, and coverage/benefits for post-acute care in coordination with multidisciplinary team members.    Marifer Celeste MSW, CSW

## 2024-07-03 NOTE — PLAN OF CARE
Problem: Adult Inpatient Plan of Care  Goal: Plan of Care Review  Outcome: Progressing  Goal: Patient-Specific Goal (Individualized)  Outcome: Progressing  Goal: Absence of Hospital-Acquired Illness or Injury  Outcome: Progressing  Goal: Optimal Comfort and Wellbeing  Outcome: Progressing  Goal: Readiness for Transition of Care  Outcome: Progressing  Shift note: vital signs stable. Safety measures in place. Pt needs met.

## 2024-07-03 NOTE — PLAN OF CARE
Problem: Adult Inpatient Plan of Care  Goal: Plan of Care Review  Outcome: Met  Goal: Patient-Specific Goal (Individualized)  Outcome: Met  Goal: Absence of Hospital-Acquired Illness or Injury  Outcome: Met  Goal: Optimal Comfort and Wellbeing  Outcome: Met  Goal: Readiness for Transition of Care  Outcome: Met     Problem: Diabetes Comorbidity  Goal: Blood Glucose Level Within Targeted Range  Outcome: Met     Problem: Wound  Goal: Optimal Coping  Outcome: Met  Goal: Optimal Functional Ability  Outcome: Met  Goal: Absence of Infection Signs and Symptoms  Outcome: Met  Goal: Improved Oral Intake  Outcome: Met  Goal: Optimal Pain Control and Function  Outcome: Met  Goal: Skin Health and Integrity  Outcome: Met  Goal: Optimal Wound Healing  Outcome: Met     Problem: Skin Injury Risk Increased  Goal: Skin Health and Integrity  Outcome: Met     Problem: Fall Injury Risk  Goal: Absence of Fall and Fall-Related Injury  Outcome: Met     Problem: Acute Kidney Injury/Impairment  Goal: Fluid and Electrolyte Balance  Outcome: Met  Goal: Improved Oral Intake  Outcome: Met  Goal: Effective Renal Function  Outcome: Met     Problem: Infection  Goal: Absence of Infection Signs and Symptoms  Outcome: Met     Problem: Mobility Impairment  Goal: Optimal Mobility  Outcome: Met     Problem: Occupational Therapy  Goal: Occupational Therapy Goal  Description: Goals to be met by: 7/2/2024     Patient will increase functional independence with ADLs by performing:    UE Dressing with Moderate Assistance.  LE Dressing with Maximum Assistance.  Grooming while EOB with Maximum Assistance.  Toileting from bedside commode with Maximum Assistance for hygiene and clothing management.   Toilet transfer to bedside commode with Maximum Assistance.    DME Justifications     Hospital Bed ·       Patient requires a hospital bed for positioning of the body in ways that are not feasible with an ordinary bed. The patient requires special positioning for  pain relief, limited mobility, and/or being unable to independently make changes in body position without the use of a hospital bed. Pillows and wedges will not be adequate for resolving these positional issues.         Outcome: Met     Problem: Physical Therapy  Goal: Physical Therapy Goal  Description: Goals to be met by:      Patient will increase functional independence with mobility by performin. Supine to sit with Maximum Assistance  2. Sit to supine with Maximum Assistance  3. Rolling to Left and Right with Maximum Assistance.  4. Bed to chair transfer with Maximum Assistance using LRAD  5. Lateral scooting in sitting with maximum assistance.   6. Sitting at edge of bed x8 minutes with Contact Guard Assistance  7. Sit to stand with maximum assistance x2 LRAD    Outcome: Met

## 2024-07-03 NOTE — ASSESSMENT & PLAN NOTE
Assessment/Plan:     Patient ID: Gino Crisostomo is a 33 y o  female  Bariatric Surgery Status    -s/p Vertical Sleeve Gastrectomy with Dr Jeannine Rodriguez on 8/13/2019  Presents to the office today for annual     Interested in seeing dietician to discuss meal planning to help with weight loss  Has gained almost 20 lbs over the last few months related to her PCOS  Will start with seeing RD and following up with her GYN regarding PCOS, if continue to gain weight may be interested in MWM which she was seeing prior to her surgery     · Continued/Maintain healthy weight loss with good nutrition intakes  · Adequate hydration with at least 64oz  fluid intake  · Follow diet as discussed  · Follow vitamin and mineral recommendations as reviewed with you  · Exercise as tolerated  · Colonoscopy referral made: out of age range     · Follow-up with RD and 1 year for annual  We kindly ask that your arrive 15 minutes before your scheduled appointment time with your provider to allow our staff to room you, get your vital signs and update your chart  · Get lab work done  Please call the office if you need a script  It is recommended to check with your insurance BEFORE getting labs done to make sure they are covered by your policy  · Call our office if you have any problems with abdominal pain especially associated with fever, chills, nausea, vomiting or any other concerns  · All  Post-bariatric surgery patients should be aware that very small quantities of any alcohol can cause impairment and it is very possible not to feel the effect  The effect can be in the system for several hours  It is also a stomach irritant  · It is advised to AVOID alcohol, Nonsteroidal antiinflammatory drugs (NSAIDS) and nicotine of all forms   Any of these can cause stomach irritation/pain  · Discussed the effects of alcohol on a bariatric patient and the increased impairment risk  · Keep up the good work!      Postsurgical JOSÉ on CKD 2/2 to pre-renal JOSÉ s/p HCO3 gtt and IVF  Cr greatly improved, now at baseline   Malabsorption   -At risk for malabsorption of vitamins/minerals secondary to malabsorption and restriction of intake from bariatric surgery  -Currently taking adequate postop bariatric surgery vitamin supplementation  -Next set of bariatric labs ordered for approximately 2 weeks  -Patient received education about the importance of adhering to a lifelong supplementation regimen to avoid vitamin/mineral deficiencies      Diagnoses and all orders for this visit:    Encounter for surgical aftercare following surgery of digestive system  -     Zinc; Future  -     Vitamin D 25 hydroxy; Future  -     Vitamin B12; Future  -     Vitamin B1, whole blood; Future  -     Vitamin A; Future  -     PTH, intact; Future  -     CBC and Platelet; Future  -     Comprehensive metabolic panel; Future  -     Ferritin; Future  -     Folate; Future  -     Iron Saturation %; Future    Bariatric surgery status  -     Zinc; Future  -     Vitamin D 25 hydroxy; Future  -     Vitamin B12; Future  -     Vitamin B1, whole blood; Future  -     Vitamin A; Future  -     PTH, intact; Future  -     CBC and Platelet; Future  -     Comprehensive metabolic panel; Future  -     Ferritin; Future  -     Folate; Future  -     Iron Saturation %; Future    Postsurgical malabsorption  -     Zinc; Future  -     Vitamin D 25 hydroxy; Future  -     Vitamin B12; Future  -     Vitamin B1, whole blood; Future  -     Vitamin A; Future  -     PTH, intact; Future  -     CBC and Platelet; Future  -     Comprehensive metabolic panel; Future  -     Ferritin; Future  -     Folate; Future  -     Iron Saturation %; Future    PCOS (polycystic ovarian syndrome)    Obesity, Class I, BMI 30-34 9  -     Zinc; Future  -     Vitamin D 25 hydroxy; Future  -     Vitamin B12; Future  -     Vitamin B1, whole blood; Future  -     Vitamin A; Future  -     PTH, intact; Future  -     CBC and Platelet; Future  -     Comprehensive metabolic panel; Future  -     Ferritin;  Future  -     Folate; Future  -     Iron Saturation %; Future         Subjective:      Patient ID: Donny Martinez is a 33 y o  female  -s/p Vertical Sleeve Gastrectomy with Dr Sherren Kinds on 8/13/2019  Presents to the office today for routine follow up  Tolerating diet without issues; denies N/V, dysphagia, reflux  Overall doing Well however c/o almost 20 lb weight gain over the last 2 months due to pcos and hormonal issues  Initial: 200  Current:172  EWL: (Weight loss is ahead of schedule at this post surgical period )  Sergio: 158  Current BMI is Body mass index is 30 47 kg/m²  · Tolerating a regular diet-yes  · Eating at least 60 grams of protein per day-yes  · Following 30/60 minute rule with liquids-for the most part   · Drinking at least 64 ounces of fluid per day-no around 40 oz   · Drinking carbonated beverages-no  · Sufficient exercise-walking and peloton   · Using NSAIDs regularly-no  · Using nicotine-no  · Using alcohol-no  · Supplements: bariatric mvi + calcium chews    · EWL is 70%, which places the patient ahead of schedule for expected post surgical weight loss at this time  The following portions of the patient's history were reviewed and updated as appropriate: allergies, current medications, past family history, past medical history, past social history, past surgical history and problem list     Review of Systems   Constitutional: Negative  Respiratory: Negative  Cardiovascular: Negative  Gastrointestinal: Negative  Musculoskeletal: Negative  Skin: Negative  Neurological: Negative  Psychiatric/Behavioral: Negative  Objective:    /64 (BP Location: Left arm, Patient Position: Sitting, Cuff Size: Large)   Pulse 84   Temp 98 6 °F (37 °C) (Tympanic)   Resp 16   Ht 5' 3" (1 6 m)   Wt 78 kg (172 lb)   BMI 30 47 kg/m²      Physical Exam  Vitals signs and nursing note reviewed  Constitutional:       Appearance: Normal appearance  She is obese     HENT:      Head: Normocephalic and atraumatic  Eyes:      Extraocular Movements: Extraocular movements intact  Pupils: Pupils are equal, round, and reactive to light  Neck:      Musculoskeletal: Normal range of motion  Cardiovascular:      Rate and Rhythm: Normal rate  Pulmonary:      Effort: Pulmonary effort is normal    Musculoskeletal: Normal range of motion  Skin:     General: Skin is warm and dry  Neurological:      General: No focal deficit present  Mental Status: She is alert and oriented to person, place, and time     Psychiatric:         Mood and Affect: Mood normal

## 2024-07-03 NOTE — SUBJECTIVE & OBJECTIVE
"Interval History: No events overnight. Patient is alert and oriented today. States she is overall feeling better and has no concerns. When specifically asked about her leg, she states it is doing "okay"    Review of Systems  Objective:     Vital Signs (Most Recent):  Temp: 97.8 °F (36.6 °C) (07/03/24 1221)  Pulse: 71 (07/03/24 1221)  Resp: (!) 23 (07/03/24 1221)  BP: (!) 173/74 (07/03/24 1221)  SpO2: 100 % (07/03/24 1221) Vital Signs (24h Range):  Temp:  [97.8 °F (36.6 °C)-98.3 °F (36.8 °C)] 97.8 °F (36.6 °C)  Pulse:  [64-82] 71  Resp:  [17-23] 23  SpO2:  [95 %-100 %] 100 %  BP: (124-194)/() 173/74     Weight: 83.9 kg (185 lb)  Body mass index is 33.84 kg/m².    Intake/Output Summary (Last 24 hours) at 7/3/2024 1356  Last data filed at 7/3/2024 1249  Gross per 24 hour   Intake 240 ml   Output 770 ml   Net -530 ml         Physical Exam  Vitals and nursing note reviewed.   Constitutional:       General: She is not in acute distress.     Appearance: Normal appearance. She is not ill-appearing.   Eyes:      General: No scleral icterus.  Cardiovascular:      Rate and Rhythm: Normal rate and regular rhythm.      Heart sounds: Normal heart sounds.   Pulmonary:      Effort: Pulmonary effort is normal. No respiratory distress.      Breath sounds: Normal breath sounds. No stridor. No wheezing.   Abdominal:      General: Abdomen is flat. There is no distension.      Palpations: Abdomen is soft.      Tenderness: There is no abdominal tenderness.   Musculoskeletal:      Cervical back: Normal range of motion. No rigidity.      Comments: RLE in a clean brace   Skin:     Comments: Diffuse flaking and dry skin  Overall greatly improved   Neurological:      General: No focal deficit present.      Mental Status: She is alert and oriented to person, place, and time.   Psychiatric:         Mood and Affect: Mood normal.         Behavior: Behavior normal.             Significant Labs: All pertinent labs within the past 24 hours have " been reviewed.    Significant Imaging: I have reviewed all pertinent imaging results/findings within the past 24 hours.

## 2024-07-03 NOTE — ASSESSMENT & PLAN NOTE
HAGMA 2/2 to pre-renal JOSÉ    - was on HCO3 gtt, transitioned to LR; holding IVF in the setting of bilateral UE swelling and Cr returning to BL   - [facility] daily

## 2024-07-03 NOTE — ASSESSMENT & PLAN NOTE
Lab Results   Component Value Date    K 3.4 (L) 07/03/2024   . Will continue to monitor potassium levels and replace as needed

## 2024-07-03 NOTE — ASSESSMENT & PLAN NOTE
Concern for DRESS clinically, started on IV and topical steroids. DRESS confirmed on pathology    - Antihistamine, topical steroid, lubricating eye drops  -Derm consulted, agree with DRESS, follow their recs.  - Completed IV steroid treatment, now on long po steroid course

## 2024-07-03 NOTE — ASSESSMENT & PLAN NOTE
Patient's anemia is currently controlled. Has not received any PRBCs to date. Etiology likely d/t chronic disease due to Chronic Kidney Disease  Current CBC reviewed-   Lab Results   Component Value Date    HGB 7.5 (L) 07/03/2024    HCT 23.8 (L) 07/03/2024     Monitor serial CBC and transfuse if patient becomes hemodynamically unstable, symptomatic or H/H drops below 7/21.

## 2024-07-03 NOTE — ASSESSMENT & PLAN NOTE
Results for orders placed during the hospital encounter of 06/21/24    Echo    Interpretation Summary    Left Ventricle: The left ventricle is normal in size. Increased ventricular mass. Normal wall thickness. There is concentric hypertrophy. Normal wall motion. There is normal systolic function with a visually estimated ejection fraction of 55 - 70%. Grade I diastolic dysfunction.    Right Ventricle: Normal right ventricular cavity size. Wall thickness is normal. Systolic function is normal.    Aortic Valve: The aortic valve is a trileaflet valve. There is mild aortic valve sclerosis. There is mild aortic regurgitation.    Mitral Valve: There is severe mitral annular calcification present. There is mild regurgitation.    Pulmonary Artery: The estimated pulmonary artery systolic pressure is 26 mmHg.    IVC/SVC: Normal venous pressure at 3 mmHg.

## 2024-07-03 NOTE — PLAN OF CARE
MAHSA spoke with [facility] and pt has a $1900 balance. They are willing to let pt daughter pay $900 and then do payment plan. Daughter stated she is trying to get in touch with drs at  to see when pt surgery will be. MAHSA will call daughter back around noon for updates.    MAHSA notified medical team.   Will follow up.    Marifer Celeste MSW, CSW

## 2024-07-03 NOTE — PT/OT/SLP PROGRESS
Physical Therapy Co Treatment    Patient Name:  Dahlia Spence   MRN:  [id number]  Admitting Diagnosis:  DRESS syndrome   Recent Surgery: * No surgery found *    Admit Date: 6/21/2024  Length of Stay: 11 days    Recommendations:     Discharge Recommendations: Moderate Intensity Therapy  Discharge Equipment Recommendations: hospital bed, lift device, wheelchair   Barriers to discharge: Inaccessible home, Decreased caregiver support, and Current level of assistance required    Plan:     During this hospitalization, patient to be seen 3 x/week to address the identified rehab impairments via neuromuscular re-education, gait training, therapeutic activities, therapeutic exercises, wheelchair management/training and progress towards the established goals.  Plan of Care Expires:  07/26/24  Plan of Care Reviewed with: patient    Assessment     Dahlia Spence is a 88 y.o. female admitted with a medical diagnosis of DRESS syndrome. Pt was willing to participate in skilled therapy session. Pt was motivated to sit EOB and get cleaned up. Pt was able to tolerate LE exercises well with appropriate fatigue and maintained good trunk control while sitting EOB. Pt has difficulty following commands which increases assist needed for safe bed mobility. Pt is improving with skilled PT service and will require continued care.       OT Co-treatment performed due to patient's multiple deficits requiring two skilled therapists to appropriately and safely assess patient's strength and endurance while facilitating functional tasks in addition to accommodating for patient's activity tolerance.    Problem List: weakness, impaired endurance, impaired self care skills, impaired functional mobility, gait instability, impaired balance, impaired cognition, decreased upper extremity function, decreased lower extremity function, decreased safety awareness, decreased ROM, impaired skin, impaired cardiopulmonary response to activity, impaired muscle  "length, orthopedic precautions.  Rehab Prognosis: Fair; patient would benefit from acute skilled PT services to address these deficits and reach maximum level of function.      Goals:   Multidisciplinary Problems       Physical Therapy Goals          Problem: Physical Therapy    Goal Priority Disciplines Outcome Goal Variances Interventions   Physical Therapy Goal     PT, PT/OT Progressing     Description: Goals to be met by:      Patient will increase functional independence with mobility by performin. Supine to sit with Maximum Assistance  2. Sit to supine with Maximum Assistance  3. Rolling to Left and Right with Maximum Assistance.  4. Bed to chair transfer with Maximum Assistance using LRAD  5. Lateral scooting in sitting with maximum assistance.   6. Sitting at edge of bed x8 minutes with Contact Guard Assistance  7. Sit to stand with maximum assistance x2 LRAD                         Subjective     RN Edy notified prior to session. Patient agreeable to PT/OT session.    Chief Complaint: "I'm so itchy."  Patient/Family Comments/goals: none stated  Pain/Comfort:  Pain Rating 1: 0/10    Objective:     Additional staff present: TANI Christianson    Patient found HOB elevated with: telemetry, PureWick, pulse ox (continuous), peripheral IV, knee immobilizer   Cognition:   Alert  Patient is oriented to Person  Command following: Easily distracted  General Precautions: Standard, Cardiac fall, contact   Orthopedic Precautions:RLE non weight bearing   Braces: Knee immobilizer   Body mass index is 33.84 kg/m².  Oxygen Device: Room Air  Vitals: BP (!) 173/74 (BP Location: Right arm, Patient Position: Lying)   Pulse 71   Temp 97.8 °F (36.6 °C) (Oral)   Resp (!) 23   Ht 5' 2" (1.575 m)   Wt 83.9 kg (185 lb)   SpO2 100%   BMI 33.84 kg/m²     Outcome Measures:  AM-PAC 6 CLICK MOBILITY  Turning over in bed (including adjusting bedclothes, sheets and blankets)?: 2  Sitting down on and standing up from a chair with " arms (e.g., wheelchair, bedside commode, etc.): 1  Moving from lying on back to sitting on the side of the bed?: 2  Moving to and from a bed to a chair (including a wheelchair)?: 1  Need to walk in hospital room?: 1  Climbing 3-5 steps with a railing?: 1  Basic Mobility Total Score: 8     Functional Mobility:    Bed Mobility:   Rolling/Turning to Left: maximal assistance and 2 persons for LE management and for trunk management  Rolling/Turning to Right: maximal assistance and 2 persons for LE management and for trunk management  Scooting to HOB via drawsheet: maximal assistance and 2 persons   Supine to Sit: maximal assistance and 2 persons for LE management and for trunk management; to R side of bed  Scooting anteriorly to EOB to have both feet planted on floor: maximal assistance  Sit to Supine: maximal assistance and 2 persons for LE management and for trunk management    Sitting Balance at Edge of Bed:  Static Sitting Balance: Fair : able to sit unsupported without balance loss and without UE support  Assistance Level Required: Stand-by Assistance  Time: ~20 minutes  Postural deviations noted: slouched posture, rounded shoulders, forward head, posterior pelvic tilt, and increased cervical flexion  Encouraged: To sit up and stop picking skin      Other:  Therapeutic Exercise: Patient performed 2 set(s) of 10 repetitions of  the following bed level exercises: quad sets and seated exercises: ankle pumps, long arc quads, and marches for bilateral LE. Patient required skilled PT for instruction of exercises and appropriate cues to perform exercises safely and appropriately.     Education:  Patient provided with daily orientation and goals of this PT session.  Encouraged patient to perform daily supine exercises & LE mobility to increase endurance and decrease effects of bedrest. Time provided for therapeutic counseling and discussion of health disposition. All questions answered to patient's satisfaction, within  scope of PTA practice; voiced no other concerns. White board updated in patient's room, RN notified of session.    Patient left HOB elevated with all lines intact, call button in reach, and nursing notified.    Time Tracking:     PT Received On: 07/03/24  PT Start Time: 1028     PT Stop Time: 1106  PT Total Time (min): 38 min     Billable Minutes:   Therapeutic Activity 24 and Therapeutic Exercise 14    Treatment Type: Treatment  PT/PTA: PTA       7/3/2024

## 2024-07-03 NOTE — ASSESSMENT & PLAN NOTE
.Pt had a recent TKA, fall after OR and femur fracture, requiring ORIF followed by wound infection and complex healing course. Bone cx alpha hemolytic strep. Required ABX therapy guided by ID c/o Linezolid followed by recent initiation of Zosyn therapy. off zosyn.   CT lower limb: infections vs inflammatory process in the absence of evidence of osteomyelitis    Plan:  - Ortho consulted, [facility] ortho team would like patient transferred to  under primary surgeon. If pt is unable to be safely transferred then ortho at [facility] will address her knee  - rediscuss with ID treatment options  - Continue Daptomycin treatment, plan to transition to po linezolid at discharge   - waiting for records from [facility]   - Attempts to transfer patient to [facility] at the same ortho and plastic surgery dept who operated on her in order to continue care for knee wound

## 2024-07-03 NOTE — PLAN OF CARE
Reyna Garcia MD  Infectious Diseases [phone] [address] [facility] [facility] [address] [facility] [address]      Next Steps: Go on 7/12/2024  Instructions: ID f/u appointment at 10:45am           CHW placed a call to  to confirm if patient has ID f/u appointment. CHW scheduled f/u appointment. Team notified           Starr Lester CHW  Case Management  [id number]

## 2024-07-03 NOTE — PROGRESS NOTES
[facility] [address]  [facility] Medicine  Progress Note    Patient Name: Dahlia Spence  MRN: [id number]  Patient Class: IP- Inpatient   Admission Date: 6/21/2024  Length of Stay: 11 days  Attending Physician: Akil Gandhi III*  Primary Care Provider: Frandy Roe MD        Subjective:     Principal Problem:DRESS syndrome        HPI:  88 YOF w/CKD3, T2DM, COPD, PVD 2/2 to DM, HTN, HLD, LBP, OA who presented for a skin rash. The patient had a recent I&D (05/16/2024) for her R knee wound and started zosyn. Then 7 days ago she developed full body hives with rash and tongue swelling. Family reports AMS and is not at her baseline.     Recent history: right distal femur replacement on 04/29/2024, then I&D of RLE 5/16/24, then dehiscence of the right TKA and underwent a right gastroc flap, split thickness skin graft, and wound VAC on 05/23/2024. Then the patient had a post op infection and had a 10 day course of linezolid to daptomycin. Then she had a second post op infection treated with a 6 week course of zosyn on  05/30/2024 and was seen by ID.     In the ED: patient received fluid resuscitation and IV ceftriaxone. She exhibited signs of minimal skin desqaumation amidst normal vital signs.  WBC 20.56  HGB 12.6     K 3.0  CRT 5.0 (baseline 1.5)    Overview/Hospital Course:  Pt admitted to  for management of DRESS syndrome as manifested by severe total body rash, AMS, and profound JOSÉ (Cr 5.0 on baseline of 1.3-1.6) due to Zosyn use. She had been utilizing Zosyn for treatment of a LE wound - she had a recent right TKA complicated by a femur fracture due to fall requiring ORIF which was further complicated by infection and complex wound healing. Skin biopsy on arrival confirmed diagnosis of DRESS. Treated with IV and topical steroids per derm, noted to have significant improvement. JOSÉ resolved. Transitioned to long oral steroid taper with plans to follow up with dermatology  "outpatient. Discussed alternatives to Zosyn with ID, who were concerned for prosthetic joint infection. Not a candidate for fluoroquinolone therapy given long QT, therefore would need surgical source control. 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 Discussed with [facility] ortho team, who only recommended intervention if patient became unstable, otherwise recommended continued follow up with her primary surgeon at . Discussed with her plastic surgeon, Dr. Wang, who suggested that plan with her ortho team (Dr. Hernandez) was for right AKA. Unable to transfer to  given that Dr. Hernandez is not on service, therefore plan to discharge to SNF with continued outpatient follow up.     Pt is stable for discharge to SNF. Discharging with course of po linezolid for continued management of VRE wound infection and close outpatient ID follow up. She will also need continued follow up with ortho. Also discharging with long steroid taper course along with PJP ppx, steroid cream, and lubricating eye drops. She has a follow up appointment with derm scheduled 7/8.    Interval History: No events overnight. Patient is alert and oriented today. States she is overall feeling better and has no concerns. When specifically asked about her leg, she states it is doing "okay"    Review of Systems  Objective:     Vital Signs (Most Recent):  Temp: 97.8 °F (36.6 °C) (07/03/24 1221)  Pulse: 71 (07/03/24 1221)  Resp: (!) 23 (07/03/24 1221)  BP: (!) 173/74 (07/03/24 1221)  SpO2: 100 % (07/03/24 1221) Vital Signs (24h Range):  Temp:  [97.8 °F (36.6 °C)-98.3 °F (36.8 °C)] 97.8 °F (36.6 °C)  Pulse:  [64-82] 71  Resp:  [17-23] 23  SpO2:  [95 %-100 %] 100 %  BP: (124-194)/() 173/74     Weight: 83.9 kg (185 lb)  Body mass index is 33.84 kg/m².    Intake/Output Summary (Last 24 hours) at 7/3/2024 1356  Last data filed at 7/3/2024 1249  Gross per 24 hour   Intake 240 ml "   Output 770 ml   Net -530 ml         Physical Exam  Vitals and nursing note reviewed.   Constitutional:       General: She is not in acute distress.     Appearance: Normal appearance. She is not ill-appearing.   Eyes:      General: No scleral icterus.  Cardiovascular:      Rate and Rhythm: Normal rate and regular rhythm.      Heart sounds: Normal heart sounds.   Pulmonary:      Effort: Pulmonary effort is normal. No respiratory distress.      Breath sounds: Normal breath sounds. No stridor. No wheezing.   Abdominal:      General: Abdomen is flat. There is no distension.      Palpations: Abdomen is soft.      Tenderness: There is no abdominal tenderness.   Musculoskeletal:      Cervical back: Normal range of motion. No rigidity.      Comments: RLE in a clean brace   Skin:     Comments: Diffuse flaking and dry skin  Overall greatly improved   Neurological:      General: No focal deficit present.      Mental Status: She is alert and oriented to person, place, and time.   Psychiatric:         Mood and Affect: Mood normal.         Behavior: Behavior normal.             Significant Labs: All pertinent labs within the past 24 hours have been reviewed.    Significant Imaging: I have reviewed all pertinent imaging results/findings within the past 24 hours.    Assessment/Plan:      * DRESS syndrome  Concern for DRESS clinically, started on IV and topical steroids. DRESS confirmed on pathology    - Antihistamine, topical steroid, lubricating eye drops  -Derm consulted, agree with DRESS, follow their recs.  - Completed IV steroid treatment, now on long po steroid course            Chronic diastolic heart failure  Results for orders placed during the hospital encounter of 06/21/24    Echo    Interpretation Summary    Left Ventricle: The left ventricle is normal in size. Increased ventricular mass. Normal wall thickness. There is concentric hypertrophy. Normal wall motion. There is normal systolic function with a visually  estimated ejection fraction of 55 - 70%. Grade I diastolic dysfunction.    Right Ventricle: Normal right ventricular cavity size. Wall thickness is normal. Systolic function is normal.    Aortic Valve: The aortic valve is a trileaflet valve. There is mild aortic valve sclerosis. There is mild aortic regurgitation.    Mitral Valve: There is severe mitral annular calcification present. There is mild regurgitation.    Pulmonary Artery: The estimated pulmonary artery systolic pressure is 26 mmHg.    IVC/SVC: Normal venous pressure at 3 mmHg.        Anemia  Patient's anemia is currently controlled. Has not received any PRBCs to date. Etiology likely d/t chronic disease due to Chronic Kidney Disease  Current CBC reviewed-   Lab Results   Component Value Date    HGB 7.5 (L) 07/03/2024    HCT 23.8 (L) 07/03/2024     Monitor serial CBC and transfuse if patient becomes hemodynamically unstable, symptomatic or H/H drops below 7/21.    Eye abnormalities  Ophthal consulted, continue to  follow recs which are comprised of petroleum topical application        Weakness  PT/OT following, plan to d/c to SNF    Hypokalemia    Lab Results   Component Value Date    K 3.4 (L) 07/03/2024   . Will continue to monitor potassium levels and replace as needed    JOSÉ (acute kidney injury)  Patient with acute kidney injury/acute renal failure likely due to pre-renal azotemia due to dehydration JOSÉ is currently improving. Baseline creatinine  1.5  - Labs reviewed- Renal function/electrolytes with Estimated Creatinine Clearance: 26.5 mL/min (A) (based on SCr of 1.5 mg/dL (H)). according to latest data. Monitor urine output and serial BMP and adjust therapy as needed. Avoid nephrotoxins and renally dose meds for GFR listed above.  RESOLVED    Open wound of right knee s/p R distal femoral replacement, I&D, and gastrocnemius flap  .Pt had a recent TKA, fall after OR and femur fracture, requiring ORIF followed by wound infection and complex healing  course. Bone cx alpha hemolytic strep. Required ABX therapy guided by ID c/o Linezolid followed by recent initiation of Zosyn therapy. off zosyn.   CT lower limb: infections vs inflammatory process in the absence of evidence of osteomyelitis    Plan:  - Ortho consulted, [facility] ortho team would like patient transferred to  under primary surgeon. If pt is unable to be safely transferred then ortho at [facility] will address her knee  - rediscuss with ID treatment options  - Continue Daptomycin treatment, plan to transition to po linezolid at discharge   - waiting for records from [facility]   - Attempts to transfer patient to [facility] at the same ortho and plastic surgery dept who operated on her in order to continue care for knee wound         Type 2 diabetes mellitus with other circulatory complications  Low dose ssi as needed prn  Trend glucose levels       Chronic obstructive pulmonary disease, unspecified  Supplemental oxygen as needed   Duo-nebs  Target SPO2 >88%    Chronic kidney disease, stage III (moderate)  JOSÉ on CKD 2/2 to pre-renal JOSÉ s/p HCO3 gtt and IVF  Cr greatly improved, now at baseline    Benign essential hypertension  - Continue Nifedipine for BP control       VTE Risk Mitigation (From admission, onward)           Ordered     heparin (porcine) injection 5,000 Units  Every 8 hours         06/21/24 1803     IP VTE HIGH RISK PATIENT  Once         06/21/24 1803     Place sequential compression device  Until discontinued         06/21/24 1803                    Discharge Planning   ACE: 7/3/2024     Code Status: DNR   Is the patient medically ready for discharge?:     Reason for patient still in hospital (select all that apply): Pending disposition  Discharge Plan A: Home Health   Discharge Delays: None known at this time              Damaris Kohli DO  Department of Hospital Medicine   [facility] - [facility] ([place])

## 2024-07-03 NOTE — ASSESSMENT & PLAN NOTE
Patient with acute kidney injury/acute renal failure likely due to pre-renal azotemia due to dehydration JOSÉ is currently improving. Baseline creatinine  1.5  - Labs reviewed- Renal function/electrolytes with Estimated Creatinine Clearance: 26.5 mL/min (A) (based on SCr of 1.5 mg/dL (H)). according to latest data. Monitor urine output and serial BMP and adjust therapy as needed. Avoid nephrotoxins and renally dose meds for GFR listed above.  RESOLVED

## 2024-07-03 NOTE — PT/OT/SLP PROGRESS
Occupational Therapy   Co-Treatment with PT  Co-treatment performed due to patient's multiple deficits requiring two skilled therapists to appropriately and safely assess patient's strength and endurance while facilitating functional tasks in addition to accommodating for patient's activity tolerance.     Name: Dahlia Spence  MRN: [id number]  Admitting Diagnosis:  DRESS syndrome       Recommendations:     Discharge Recommendations: Moderate Intensity Therapy  Discharge Equipment Recommendations:  hospital bed  Barriers to discharge:       Assessment:     Dahlia Spence is a 88 y.o. female with a medical diagnosis of DRESS syndrome.  She presents with the following performance deficits affecting function: weakness, impaired endurance, impaired self care skills, impaired functional mobility, gait instability, decreased lower extremity function, orthopedic precautions, impaired muscle length, decreased upper extremity function, impaired coordination, decreased safety awareness, impaired cardiopulmonary response to activity.   Rehab Prognosis:  Good; patient would benefit from acute skilled OT services to address these deficits and reach maximum level of function.       Plan:     Patient to be seen 4 x/week to address the above listed problems via self-care/home management, therapeutic activities, therapeutic exercises, neuromuscular re-education  Plan of Care Expires: 07/25/24  Plan of Care Reviewed with: patient    Subjective     Patient/Family Comments/goals: to improve function  Pain/Comfort:  Pain Rating 1: 0/10  Pain Rating Post-Intervention 1: 0/10    Objective:     Communicated with: RN prior to session.  Patient found HOB elevated with telemetry, pulse ox (continuous), PureWick, peripheral IV, knee immobilizer upon OT entry to room.  A client care conference was completed by the OTR and the COLLINS prior to treatment by the COLLINS to discuss the patient's POC and current status.    General Precautions: Standard,  contact, fall    Orthopedic Precautions:RLE non weight bearing  Braces: Knee immobilizer  Respiratory Status: Room air     Occupational Performance:     Bed Mobility:    Patient completed Scooting/Bridging with maximal assistance  Patient completed Supine to Sit with maximal assistance and 2 persons  Patient completed Sit to Supine with maximal assistance and 2 persons       Activities of Daily Living:  Grooming: stand by assistance for oral hygiene and facial care sitting EOB  Bathing: maximal assistance to wipe upper body with bath wipes      AMPAC 6 Click ADL: 12    Treatment & Education:  Pt educated on OT POC and frequency during hospital stay.   Pt educated on importance of OOB activity to improve function and activity tolerance.  Addressed all patient questions/concerns within COLLINS scope of practice.     Patient left HOB elevated with all lines intact, call button in reach, and RN notified    GOALS:   Multidisciplinary Problems       Occupational Therapy Goals          Problem: Occupational Therapy    Goal Priority Disciplines Outcome Interventions   Occupational Therapy Goal     OT, PT/OT Progressing    Description: Goals to be met by: 7/2/2024     Patient will increase functional independence with ADLs by performing:    UE Dressing with Moderate Assistance.  LE Dressing with Maximum Assistance.  Grooming while EOB with Maximum Assistance.  Toileting from bedside commode with Maximum Assistance for hygiene and clothing management.   Toilet transfer to bedside commode with Maximum Assistance.    DME Justifications     Hospital Bed ·       Patient requires a hospital bed for positioning of the body in ways that are not feasible with an ordinary bed. The patient requires special positioning for pain relief, limited mobility, and/or being unable to independently make changes in body position without the use of a hospital bed. Pillows and wedges will not be adequate for resolving these positional issues.                               Time Tracking:     OT Date of Treatment: 07/03/24  OT Start Time: 1028  OT Stop Time: 1106  OT Total Time (min): 38 min    Billable Minutes:Self Care/Home Management 25  Therapeutic Activity 13    OT/MANDI: MANDI     Number of MANDI visits since last OT visit: 4    7/3/2024

## 2024-07-03 NOTE — PLAN OF CARE
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       Patient is homebound due to:  DRESS syndrome    Allergies:  Review of patient's allergies indicates:   Allergen Reactions    Zosyn [piperacillin-tazobactam] Rash     DRESS syndrome    Gabapentin Hallucinations       Vitals:  Routine    Diet: regular diet    Activities:   Activity as tolerated    Goals of Care Treatment Preferences:  Code Status: DNR      Labs:  Defer to facility    Nursing Precautions:  Fall    Consults:   PT to evaluate and treat- 5 times a week and OT to evaluate and treat- 5 times a week     Miscellaneous Care: Routine Skin for Bedridden Patients:  Apply moisture barrier cream to all                      Medications: Discontinue all previous medication orders, if any. See new list below.     Medication List        START taking these medications      atovaquone 750 mg/5 mL Susp oral liquid  Commonly known as: MEPRON  Take 10  mLs (1,500 mg total) by mouth once daily.     calcium carbonate 200 mg calcium (500 mg) chewable tablet  Commonly known as: TUMS  Take 1 tablet (500 mg total) by mouth once daily.  Start taking on: July 4, 2024     cetirizine 5 MG tablet  Commonly known as: ZYRTEC  Take 1 tablet (5 mg total) by mouth once daily.  Start taking on: July 4, 2024     ergocalciferol 50,000 unit Cap  Commonly known as: ERGOCALCIFEROL  Take 1 capsule (50,000 Units total) by mouth every 7 days.  Start taking on: July 10, 2024     linezolid 600 mg Tab  Commonly known as: ZYVOX  Take 1 tablet (600 mg total) by mouth every 12 (twelve) hours. Until 7/31     NIFEdipine 30 MG (OSM) 24 hr tablet  Commonly known as: PROCARDIA-XL  Take 1 tablet (30 mg total) by mouth once daily.  Start taking on: July 4, 2024     predniSONE 10 MG tablet  Commonly known as: DELTASONE  Take 8 tablets (80 mg total) by mouth once daily for 7 days, THEN 6 tablets (60 mg total) once daily for 7 days, THEN 4 tablets (40 mg total) once daily for 7 days, THEN 2 tablets (20 mg total) once daily for 7 days, THEN 1 tablet (10 mg total) once daily for 7 days.  Start taking on: July 4, 2024     triamcinolone acetonide 0.1% 0.1 % cream  Commonly known as: KENALOG  Apply topically 2 (two) times daily to arms, legs, chest, abdomen, back, buttock that have visible wounds/lesions. Wet wrap therapy:  - Apply thin layer of steroid cream to all affected areas on body.   - Cover with warm, damp cotton Pjs (or wrap with warm, damp towels) first then second layer of dry cotton Pjs (or wrap with large dry towel or blanket).  - Remove after 30 minutes to an hour.   - Re-apply thin layer of steroid cream to all affected areas on body.     white petrolatum 41 % Oint  Apply topically as needed. Apply to mouth     white petrolatum-mineral oiL 94-3 % Oint  Place into both eyes 2 (two) times a day.            CHANGE how you take these medications      allopurinoL 100 MG tablet  Commonly known as:  ZYLOPRIM  Take 2 tablets (200 mg total) by mouth once daily. HOLD until follow up with PCP  What changed:   when to take this  additional instructions     famotidine 20 MG tablet  Commonly known as: PEPCID  Take 1 tablet (20 mg total) by mouth once daily.  What changed:   when to take this  reasons to take this     lisinopriL-hydrochlorothiazide 20-12.5 mg per tablet  Commonly known as: PRINZIDE,ZESTORETIC  Take 1 tablet by mouth once daily. HOLD until follow up with PCP  What changed:   when to take this  additional instructions     risedronate 35 MG tablet  Commonly known as: ACTONEL  Take 1 tablet (35 mg total) by mouth every 7 days. HOLD until follow up with PCP  What changed: additional instructions            CONTINUE taking these medications      acetaminophen 325 MG tablet  Commonly known as: TYLENOL  Take 650 mg by mouth every 6 (six) hours as needed for Pain.     albuterol-ipratropium 2.5 mg-0.5 mg/3 mL nebulizer solution  Commonly known as: DUO-NEB  Take 3 mLs by nebulization every 6 (six) hours as needed for Wheezing. Rescue     aspirin 81 MG EC tablet  Commonly known as: ECOTRIN  Take 81 mg by mouth once daily.     benzonatate 200 MG capsule  Commonly known as: TESSALON  Take 200 mg by mouth 3 (three) times daily as needed for Cough.     ferrous sulfate Tab tablet  Commonly known as: FEOSOL  Take 1 tablet by mouth once daily.     HIGH POTENCY MULTIVITAMIN 400 mcg Tab  Generic drug: multivitamin with folic acid  Take 1 tablet by mouth once daily.     Saccharomyces boulardii 250 mg capsule  Commonly known as: FLORASTOR  Take 250 mg by mouth once daily.     simvastatin 20 MG tablet  Commonly known as: ZOCOR  TAKE 1 TABLET BY MOUTH EVERY EVENING     VITAMIN C 500 MG tablet  Generic drug: ascorbic acid (vitamin C)  Take 500 mg by mouth 2 (two) times daily.            STOP taking these medications      amLODIPine 5 MG tablet  Commonly known as: NORVASC     oxyCODONE 5 mg Cap  Commonly known as: OXY-IR                 Immunizations Administered as of 7/3/2024       Name Date Dose VIS Date Route Exp Date    COVID-19, MRNA, LN-S, PF (Moderna) 3/5/2021 0.5 mL -- -- --    : Modern[employer]     Lot: [id number]     Comment: Adminis     COVID-19, MRNA, LN-S, PF (Pfizer) (Purple Cap) 8/21/2021 0.3 mL 5/10/2021 Intramuscular --    Site: Right arm     : Pf[employer]     Lot: [id number]     Comment: Adminis     COVID-19, MRNA, LN-S, PF (Pfizer) (Purple Cap) 7/27/2021 0.3 mL 5/10/2021 Intramuscular --    Site: Left arm     : Pf[employer]     Lot: [id number]     Comment: Adminis               Some patients may experience side effects after vaccination.  These may include fever, headache, muscle or joint aches.  Most symptoms resolve with 24-48 hours and do not require urgent medical evaluation unless they persist for more than 72 hours or symptoms are concerning for an unrelated medical condition.          _________________________________  Damaris Kohli, DO  07/03/2024

## 2024-07-04 VITALS
TEMPERATURE: 99 F | HEART RATE: 66 BPM | BODY MASS INDEX: 34.04 KG/M2 | HEIGHT: 62 IN | WEIGHT: 185 LBS | OXYGEN SATURATION: 100 % | SYSTOLIC BLOOD PRESSURE: 173 MMHG | DIASTOLIC BLOOD PRESSURE: 74 MMHG | RESPIRATION RATE: 19 BRPM

## 2024-07-05 NOTE — PLAN OF CARE
[facility] ([place])  Discharge Final Note    Primary Care Provider: Frandy Roe MD    Expected Discharge Date: 7/3/2024    Patient discharged to [facility] for SNF via stretcher transportation.     Patient's bedside nurse and family notified of the above.    Discharge Plan A and Plan B have been determined by review of patient's clinical status, future medical and therapeutic needs, and coverage/benefits for post-acute care in coordination with multidisciplinary team members.        Final Discharge Note (most recent)       Final Note - 07/05/24 0929          Final Note    Assessment Type Final Discharge Note     Anticipated Discharge Disposition Skilled Nursing Facility     What phone number can be called within the next 1-3 days to see how you are doing after discharge? [phone]     Hospital Resources/Appts/Education Provided Appointments scheduled and added to AVS        Post-Acute Status    Post-Acute Authorization Placement     Coverage PHN     Discharge Delays None known at this time                     Important Message from Medicare             Contact Info       Reyna Garcia MD   Specialty: Infectious Diseases    [address] [place] [address]  [address]  [place] [address]   Phone: [phone]       Next Steps: Go on 7/12/2024    Instructions: ID f/u appointment at 10:45am            Future Appointments   Date Time Provider Department Center   7/8/2024  8:30 AM [facility] scheduled post-discharge follow-up appointment and information added to AVS.     Marifer Celeste MSW, CSW